# Patient Record
Sex: MALE | Race: WHITE | Employment: OTHER | URBAN - METROPOLITAN AREA
[De-identification: names, ages, dates, MRNs, and addresses within clinical notes are randomized per-mention and may not be internally consistent; named-entity substitution may affect disease eponyms.]

---

## 2017-10-31 ENCOUNTER — HOSPITAL ENCOUNTER (OUTPATIENT)
Dept: LAB | Age: 82
Discharge: HOME | End: 2017-10-31

## 2017-10-31 PROCEDURE — 88305 TISSUE EXAM BY PATHOLOGIST: CPT | Performed by: INTERNAL MEDICINE

## 2019-01-26 ENCOUNTER — HOSPITAL ENCOUNTER (EMERGENCY)
Age: 84
Discharge: HOME | End: 2019-01-26
Attending: EMERGENCY MEDICINE
Payer: MEDICARE

## 2019-01-26 ENCOUNTER — APPOINTMENT (OUTPATIENT)
Dept: GENERAL RADIOLOGY | Age: 84
End: 2019-01-26
Attending: EMERGENCY MEDICINE
Payer: MEDICARE

## 2019-01-26 VITALS
TEMPERATURE: 98 F | BODY MASS INDEX: 25.92 KG/M2 | WEIGHT: 175 LBS | HEART RATE: 70 BPM | OXYGEN SATURATION: 99 % | SYSTOLIC BLOOD PRESSURE: 150 MMHG | DIASTOLIC BLOOD PRESSURE: 70 MMHG | HEIGHT: 69 IN | RESPIRATION RATE: 16 BRPM

## 2019-01-26 DIAGNOSIS — N18.9 CHRONIC KIDNEY DISEASE, UNSPECIFIED CKD STAGE: ICD-10-CM

## 2019-01-26 DIAGNOSIS — R07.89 ATYPICAL CHEST PAIN: Primary | ICD-10-CM

## 2019-01-26 LAB
ALBUMIN SERPL-MCNC: 3.7 G/DL (ref 3.2–4.6)
ALBUMIN/GLOB SERPL: 1 {RATIO}
ALP SERPL-CCNC: 74 U/L (ref 50–136)
ALT SERPL-CCNC: 57 U/L (ref 12–65)
ANION GAP SERPL CALC-SCNC: 4 MMOL/L
AST SERPL-CCNC: 33 U/L (ref 15–37)
BASOPHILS # BLD: 0.1 K/UL (ref 0–0.2)
BASOPHILS NFR BLD: 1 % (ref 0–2)
BILIRUB SERPL-MCNC: 0.4 MG/DL (ref 0.2–1.1)
BUN SERPL-MCNC: 24 MG/DL (ref 8–23)
CALCIUM SERPL-MCNC: 8.9 MG/DL (ref 8.3–10.4)
CHLORIDE SERPL-SCNC: 107 MMOL/L (ref 98–107)
CO2 SERPL-SCNC: 27 MMOL/L (ref 21–32)
CREAT SERPL-MCNC: 2.24 MG/DL (ref 0.8–1.5)
DIFFERENTIAL METHOD BLD: NORMAL
EOSINOPHIL # BLD: 0.2 K/UL (ref 0–0.8)
EOSINOPHIL NFR BLD: 3 % (ref 0.5–7.8)
ERYTHROCYTE [DISTWIDTH] IN BLOOD BY AUTOMATED COUNT: 13.3 % (ref 11.9–14.6)
GLOBULIN SER CALC-MCNC: 3.7 G/DL (ref 2.3–3.5)
GLUCOSE SERPL-MCNC: 154 MG/DL (ref 65–100)
HCT VFR BLD AUTO: 47.1 % (ref 41.1–50.3)
HGB BLD-MCNC: 14.9 G/DL (ref 13.6–17.2)
IMM GRANULOCYTES # BLD AUTO: 0 K/UL (ref 0–0.5)
IMM GRANULOCYTES NFR BLD AUTO: 0 % (ref 0–5)
LIPASE SERPL-CCNC: 35 U/L (ref 73–393)
LYMPHOCYTES # BLD: 1.5 K/UL (ref 0.5–4.6)
LYMPHOCYTES NFR BLD: 18 % (ref 13–44)
MAGNESIUM SERPL-MCNC: 2.4 MG/DL (ref 1.8–2.4)
MCH RBC QN AUTO: 28.2 PG (ref 26.1–32.9)
MCHC RBC AUTO-ENTMCNC: 31.6 G/DL (ref 31.4–35)
MCV RBC AUTO: 89 FL (ref 79.6–97.8)
MONOCYTES # BLD: 0.7 K/UL (ref 0.1–1.3)
MONOCYTES NFR BLD: 9 % (ref 4–12)
NEUTS SEG # BLD: 5.5 K/UL (ref 1.7–8.2)
NEUTS SEG NFR BLD: 69 % (ref 43–78)
NRBC # BLD: 0 K/UL (ref 0–0.2)
PLATELET # BLD AUTO: 188 K/UL (ref 150–450)
PMV BLD AUTO: 10 FL (ref 9.4–12.3)
POTASSIUM SERPL-SCNC: 4.4 MMOL/L (ref 3.5–5.1)
PROT SERPL-MCNC: 7.4 G/DL
RBC # BLD AUTO: 5.29 M/UL (ref 4.23–5.6)
SODIUM SERPL-SCNC: 138 MMOL/L (ref 136–145)
TROPONIN I BLD-MCNC: 0 NG/ML (ref 0.02–0.05)
TROPONIN I BLD-MCNC: 0.03 NG/ML (ref 0.02–0.05)
TROPONIN I SERPL-MCNC: <0.02 NG/ML (ref 0.02–0.05)
WBC # BLD AUTO: 8 K/UL (ref 4.3–11.1)

## 2019-01-26 PROCEDURE — 84484 ASSAY OF TROPONIN QUANT: CPT

## 2019-01-26 PROCEDURE — 83690 ASSAY OF LIPASE: CPT

## 2019-01-26 PROCEDURE — 74011250636 HC RX REV CODE- 250/636: Performed by: EMERGENCY MEDICINE

## 2019-01-26 PROCEDURE — 83735 ASSAY OF MAGNESIUM: CPT

## 2019-01-26 PROCEDURE — 71045 X-RAY EXAM CHEST 1 VIEW: CPT

## 2019-01-26 PROCEDURE — 99285 EMERGENCY DEPT VISIT HI MDM: CPT | Performed by: EMERGENCY MEDICINE

## 2019-01-26 PROCEDURE — 85025 COMPLETE CBC W/AUTO DIFF WBC: CPT

## 2019-01-26 PROCEDURE — 96360 HYDRATION IV INFUSION INIT: CPT | Performed by: EMERGENCY MEDICINE

## 2019-01-26 PROCEDURE — 80053 COMPREHEN METABOLIC PANEL: CPT

## 2019-01-26 RX ADMIN — SODIUM CHLORIDE 1000 ML: 900 INJECTION, SOLUTION INTRAVENOUS at 20:52

## 2019-01-26 NOTE — ED TRIAGE NOTES
Pt in states mid chest pressure starting today at lunch. States he went for a walk and pain did not get worse or better. States pain went away 30 minutes pta. 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 States dizziness.  No sob/n/v/d.

## 2019-01-26 NOTE — ED PROVIDER NOTES
Patient presents to the ER complaining of chest pain. Reports symptoms started last evening with some left parasternal chest pressure that improved. Reports he has had some episodes of pain throughout the day. Reports muscle pain, resolved 30 minutes prior to arrival.  Does report some nausea but denies any vomiting. Denies any diaphoresis. Reports he's had some \"dizziness\" throughout the week. The history is provided by the patient. Chest Pain This is a new problem. The current episode started yesterday. The problem has been resolved. Duration of episode(s) is 45 minutes. The pain is present in the substernal region and left side. The pain is at a severity of 3/10. The pain is mild. The quality of the pain is described as pressure-like. The pain does not radiate. Associated symptoms include dizziness, malaise/fatigue and nausea. Pertinent negatives include no abdominal pain, no back pain, no diaphoresis, no fever, no leg pain, no numbness, no palpitations and no vomiting. His past medical history is significant for DM. Past Medical History:  
Diagnosis Date  AR (allergic rhinitis) 8/3/2016  Arthritis  Benign paroxysmal positional vertigo  Bilateral impacted cerumen  CAD (coronary artery disease) stent 10 years ago  Cancer ([address]   
 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  
 Chronic otitis media  Diabetes ([address] brittle IDDM; -110; 2 hours after a meal 140-180; last HA1C 7.2  Diverticulitis 8/3/2016  DNS (deviated nasal septum)  ETD (eustachian tube dysfunction)  Heart disease 8/3/2016  High frequency hearing loss  Hypercholesterolemia  Hyperlipidemia  Hypertrophy of both inferior nasal turbinates  Ill-defined condition   
 inguinal hernia  MHL (mixed hearing loss)  NO (nasal obstruction)  Otitis media, nonsuppurative  Pancreatitis   
 chronis-dx'ed 6 months ago  SNHL (sensorineural hearing loss) 8/3/2016  Unspecified sleep apnea   
 no cpap; O2 nc - setting \"30\"  Vertigo Past Surgical History:  
Procedure Laterality Date  CARDIAC SURG PROCEDURE UNLIST    
 stent  HX APPENDECTOMY  HX HEENT    
 augustine cat; vitrectomy  HX HERNIA REPAIR Right Dr Troy José 30 years ago.  HX MASTOIDECTOMY  1996  
 right modified canal wall down  HX OTHER SURGICAL    
 tube right ear  HX OTHER SURGICAL    
 skin lesion; local excision: SCC  
 HX OTHER SURGICAL  1/14/03 Dupuytrens contracture release Family History:  
Problem Relation Age of Onset  Cancer Maternal Grandmother   
     colon  Heart Disease Maternal Grandfather Social History Socioeconomic History  Marital status:  Spouse name: Not on file  Number of children: Not on file  Years of education: Not on file  Highest education level: Not on file Social Needs  Financial resource strain: Not on file  Food insecurity - worry: Not on file  Food insecurity - inability: Not on file  Transportation needs - medical: Not on file  Transportation needs - non-medical: Not on file Occupational History  Not on file Tobacco Use  Smoking status: Never Smoker  Smokeless tobacco: Never Used Substance and Sexual Activity  Alcohol use: No  
 Drug use: No  
 Sexual activity: Not on file Other Topics Concern  Not on file Social History Narrative  Not on file ALLERGIES: Iohexol; Other medication; Bextra [valdecoxib]; Cipro [ciprofloxacin]; Codeine; Iodinated contrast- oral and iv dye; and Penicillins Review of Systems Constitutional: Positive for malaise/fatigue. Negative for diaphoresis and fever. HENT: Negative for congestion, dental problem and drooling. Eyes: Negative for redness. Respiratory: Negative for chest tightness. Cardiovascular: Positive for chest pain. Negative for palpitations. Gastrointestinal: Positive for nausea. Negative for abdominal pain and vomiting. Endocrine: Negative for polydipsia, polyphagia and polyuria. Genitourinary: Positive for frequency. Musculoskeletal: Negative for back pain and gait problem. Skin: Negative for pallor and rash. Neurological: Positive for dizziness. Negative for light-headedness and numbness. Hematological: Negative for adenopathy. Does not bruise/bleed easily. Psychiatric/Behavioral: Negative for behavioral problems and confusion. All other systems reviewed and are negative. Vitals:  
 01/26/19 1841 BP: 171/79 Pulse: 67 Resp: 19 Temp: 98.3 °F (36.8 °C) SpO2: 98% Weight: 79.4 kg (175 lb) Height: 5' 9\" (1.753 m) Physical Exam  
Constitutional: He is oriented to person, place, and time. He appears well-developed and well-nourished. HENT:  
Head: Normocephalic and atraumatic. Eyes: Conjunctivae and EOM are normal. Pupils are equal, round, and reactive to light. Neck: Normal range of motion. Neck supple. Cardiovascular: Normal rate and regular rhythm. Pulmonary/Chest: Effort normal and breath sounds normal.  
Abdominal: Soft. Bowel sounds are normal. He exhibits no distension. There is no tenderness. Musculoskeletal: Normal range of motion. He exhibits no edema or deformity. Neurological: He is alert and oriented to person, place, and time. Nursing note and vitals reviewed. MDM Number of Diagnoses or Management Options Diagnosis management comments: Patient is well-appearing here. We will obtain basic labs, EKG and chest x-ray 9:50 PM 
EKG performed here, stable. Cardiac enzymes are negative ×2. Labs only significant for an elevated creatinine of 2.2. Only comparison. Labs we have are from 2016. Patient states he believes he has issues with his kidneys related to his diabetes. Nevertheless, given his negative testing here, appears stable for discharge and outpatient follow-up Amount and/or Complexity of Data Reviewed Clinical lab tests: reviewed and ordered Tests in the radiology section of CPT®: ordered and reviewed Independent visualization of images, tracings, or specimens: yes (EKG shows a normal sinus rhythm, rate of 62, normal axis, first-degree AV block noted, no acute ischemic changes. Stable In comparison to previous EKG) Risk of Complications, Morbidity, and/or Mortality Presenting problems: moderate Diagnostic procedures: low Management options: low Procedures Results Include: 
 
Recent Results (from the past 24 hour(s)) CBC WITH AUTOMATED DIFF Collection Time: 01/26/19  6:48 PM  
Result Value Ref Range WBC 8.0 4.3 - 11.1 K/uL  
 RBC 5.29 4.23 - 5.6 M/uL  
 HGB 14.9 13.6 - 17.2 g/dL HCT 47.1 41.1 - 50.3 % MCV 89.0 79.6 - 97.8 FL  
 MCH 28.2 26.1 - 32.9 PG  
 MCHC 31.6 31.4 - 35.0 g/dL  
 RDW 13.3 11.9 - 14.6 % PLATELET 299 023 - 502 K/uL MPV 10.0 9.4 - 12.3 FL ABSOLUTE NRBC 0.00 0.0 - 0.2 K/uL  
 DF AUTOMATED NEUTROPHILS 69 43 - 78 % LYMPHOCYTES 18 13 - 44 % MONOCYTES 9 4.0 - 12.0 % EOSINOPHILS 3 0.5 - 7.8 % BASOPHILS 1 0.0 - 2.0 % IMMATURE GRANULOCYTES 0 0.0 - 5.0 %  
 ABS. NEUTROPHILS 5.5 1.7 - 8.2 K/UL  
 ABS. LYMPHOCYTES 1.5 0.5 - 4.6 K/UL  
 ABS. MONOCYTES 0.7 0.1 - 1.3 K/UL  
 ABS. EOSINOPHILS 0.2 0.0 - 0.8 K/UL  
 ABS. BASOPHILS 0.1 0.0 - 0.2 K/UL  
 ABS. IMM. GRANS. 0.0 0.0 - 0.5 K/UL METABOLIC PANEL, COMPREHENSIVE Collection Time: 01/26/19  6:48 PM  
Result Value Ref Range Sodium 138 136 - 145 mmol/L Potassium 4.4 3.5 - 5.1 mmol/L Chloride 107 98 - 107 mmol/L  
 CO2 27 21 - 32 mmol/L Anion gap 4 mmol/L Glucose 154 (H) 65 - 100 mg/dL BUN 24 (H) 8 - 23 MG/DL Creatinine 2.24 (H) 0.8 - 1.5 MG/DL  
 GFR est AA 36 (L) >60 ml/min/1.73m2 GFR est non-AA 30 ml/min/1.73m2 Calcium 8.9 8.3 - 10.4 MG/DL Bilirubin, total 0.4 0.2 - 1.1 MG/DL  
 ALT (SGPT) 57 12 - 65 U/L  
 AST (SGOT) 33 15 - 37 U/L Alk. phosphatase 74 50 - 136 U/L Protein, total 7.4 g/dL Albumin 3.7 3.2 - 4.6 g/dL Globulin 3.7 (H) 2.3 - 3.5 g/dL A-G Ratio 1.0 MAGNESIUM Collection Time: 01/26/19  6:48 PM  
Result Value Ref Range Magnesium 2.4 1.8 - 2.4 mg/dL LIPASE Collection Time: 01/26/19  6:48 PM  
Result Value Ref Range Lipase 35 (L) 73 - 393 U/L  
TROPONIN I Collection Time: 01/26/19  6:48 PM  
Result Value Ref Range Troponin-I, Qt. <0.02 (L) 0.02 - 0.05 NG/ML  
POC TROPONIN-I Collection Time: 01/26/19  8:54 PM  
Result Value Ref Range Troponin-I (POC) 0 (L) 0.02 - 0.05 ng/ml Voice dictation software was used during the making of this note. This software is not perfect and grammatical and other typographical errors may be present. This note has been proofread, but may still contain errors.  
Olivia Franco MD; 1/26/2019 @9:51 PM  
===================================================================

## 2019-01-27 NOTE — DISCHARGE INSTRUCTIONS
Drink plenty of fluids  Follow-up with your primary care physician  Follow-up with cardiology  Return to the ER for any new or worsening symptoms    Chest Pain: Care Instructions  Your Care Instructions    There are many things that can cause chest pain. Some are not serious and will get better on their own in a few days. But some kinds of chest pain need more testing and treatment. Your doctor may have recommended a follow-up visit in the next 8 to 12 hours. If you are not getting better, you may need more tests or treatment. Even though your doctor has released you, you still need to watch for any problems. The doctor carefully checked you, but sometimes problems can develop later. If you have new symptoms or if your symptoms do not get better, get medical care right away. If you have worse or different chest pain or pressure that lasts more than 5 minutes or you passed out (lost consciousness), call 911 or seek other emergency help right away. A medical visit is only one step in your treatment. Even if you feel better, you still need to do what your doctor recommends, such as going to all suggested follow-up appointments and taking medicines exactly as directed. This will help you recover and help prevent future problems. How can you care for yourself at home? · Rest until you feel better. · Take your medicine exactly as prescribed. Call your doctor if you think you are having a problem with your medicine. · Do not drive after taking a prescription pain medicine. When should you call for help? Call 911 if:    · You passed out (lost consciousness).     · You have severe difficulty breathing.     · You have symptoms of a heart attack. These may include:  ? Chest pain or pressure, or a strange feeling in your chest.  ? Sweating. ? Shortness of breath. ? Nausea or vomiting.   ? Pain, pressure, or a strange feeling in your back, neck, jaw, or upper belly or in one or both shoulders or arms.  ? Lightheadedness or sudden weakness. ? A fast or irregular heartbeat. After you call 911, the  may tell you to chew 1 adult-strength or 2 to 4 low-dose aspirin. Wait for an ambulance. Do not try to drive yourself.    Call your doctor today if:    · You have any trouble breathing.     · Your chest pain gets worse.     · You are dizzy or lightheaded, or you feel like you may faint.     · You are not getting better as expected.     · You are having new or different chest pain. Where can you learn more? Go to http://bernice-radha.info/. Enter A120 in the search box to learn more about \"Chest Pain: Care Instructions. \"  Current as of: September 23, 2018  Content Version: 11.9  © 6255-3421 [employer]. Care instructions adapted under license by [employer] (which disclaims liability or warranty for this information). If you have questions about a medical condition or this instruction, always ask your healthcare professional. [place] [address] any warranty or liability for your use of this information. Patient Education        Chronic Kidney Disease: Care Instructions  Your Care Instructions    Chronic kidney disease happens when your kidneys don't work as well as they should. Your kidneys have a few important jobs. They remove waste from your blood. This waste leaves your body in your urine. They also balance your body's fluids and chemicals. When your kidneys don't work well, extra waste and fluid can build up. This can poison the body and sometimes cause death. The most common causes of this disease are diabetes and high blood pressure. In some cases, the disease develops in 2 to 3 months. But it usually develops over many years. If you take medicine and make healthy changes to your lifestyle, you may be able to prevent the disease from getting worse.  But if your kidney damage gets worse, you may need dialysis or a kidney transplant. Dialysis uses a machine to filter waste from the blood. A transplant is surgery to give you a healthy kidney from another person. Follow-up care is a key part of your treatment and safety. Be sure to make and go to all appointments, and call your doctor if you are having problems. It's also a good idea to know your test results and keep a list of the medicines you take. How can you care for yourself at home?   Treatments and appointments    · Be safe with medicines. Take your medicines exactly as prescribed. Call your doctor if you have any problems with your medicine. You also may take medicine to control your blood pressure or to treat diabetes. Many people who have diabetes take blood pressure medicine.     · If you have diabetes, do your best to keep your blood sugar in your target range. You may do this by eating healthy food and exercising. You may also take medicines.     · Go to your dialysis appointments if you have this treatment.     · Do not take ibuprofen (Advil, Motrin), naproxen (Aleve), or similar medicines, unless your doctor tells you to. These may make the disease worse.     · Do not take any vitamins, over-the-counter medicines, or herbal products without talking to your doctor first.     · Do not smoke or use other tobacco products. Smoking can reduce blood flow to the kidneys. If you need help quitting, talk to your doctor about stop-smoking programs and medicines. These can increase your chances of quitting for good.     · Do not drink alcohol or use illegal drugs.     · Talk to your doctor about an exercise plan. Exercise helps lower your blood pressure. It also makes you feel better.     · If you have an advance directive, let your doctor know. It may include a living will and a durable power of  for health care. If you don't have one, you may want to prepare one. It lets your doctor and loved ones know your health care wishes if you become unable to speak for yourself. Diet    · Talk to a registered dietitian. He or she can help you make a meal plan that is right for you. Most people with kidney disease need to limit salt (sodium), fluids, and protein. Some also have to limit potassium and phosphorus.     · You may have to give up many foods you like. But try to focus on the fact that this will help you stay healthy for as long as possible.     · If you have a hard time eating enough, talk to your doctor or dietitian about ways to add calories to your diet.     · Your diet may change as your disease changes. See your doctor for regular testing. And work with a dietitian to change your diet as needed. When should you call for help? Call 911 anytime you think you may need emergency care. For example, call if:    · You passed out (lost consciousness).    Call your doctor now or seek immediate medical care if:    · You have less urine than normal or no urine.     · You have trouble urinating or can urinate only very small amounts.     · You are confused or have trouble thinking clearly.     · You feel weaker or more tired than usual.     · You are very thirsty, lightheaded, or dizzy.     · You have nausea and vomiting.     · You have new swelling of your arms or feet, or your swelling is worse.     · You have blood in your urine.     · You have new or worse trouble breathing.    Watch closely for changes in your health, and be sure to contact your doctor if:    · You have any problems with your medicine or other treatment. Where can you learn more? Go to http://bernice-radha.info/. Enter N276 in the search box to learn more about \"Chronic Kidney Disease: Care Instructions. \"  Current as of: March 14, 2018  Content Version: 11.9  © 7632-8962 [employer]. Care instructions adapted under license by [employer] (which disclaims liability or warranty for this information).  If you have questions about a medical condition or this instruction, always ask your healthcare professional. [place] [address] any warranty or liability for your use of this information.

## 2019-01-27 NOTE — ED NOTES
I have reviewed discharge instructions with the patient. The patient verbalized understanding. Patient left ED via Discharge Method: ambulatory to Home with self. Opportunity for questions and clarification provided. Patient given 0 scripts. To continue your aftercare when you leave the hospital, you may receive an automated call from our care team to check in on how you are doing. This is a free service and part of our promise to provide the best care and service to meet your aftercare needs.  If you have questions, or wish to unsubscribe from this service please call [phone]. Thank you for Choosing our [facility].

## 2019-01-27 NOTE — ED NOTES
The pt stated that he had had an onset of pain at approx. Noon today. A short time layer he had gone for a walk to try and relieve the pain. Afterwards he had had a pair of episodes where he had started to sweat, but the sweating had stopped. But the chest pain remained. The pt denied any SOB, Nausea or vomiting. The pt had no complaints at time of ROS.

## 2019-02-07 ENCOUNTER — HOSPITAL ENCOUNTER (OUTPATIENT)
Dept: LAB | Age: 84
Discharge: HOME | End: 2019-02-07
Payer: MEDICARE

## 2019-02-07 DIAGNOSIS — N18.30 STAGE 3 CHRONIC KIDNEY DISEASE (HCC): ICD-10-CM

## 2019-02-07 PROBLEM — I25.10 CORONARY ARTERY DISEASE INVOLVING NATIVE HEART: Status: ACTIVE | Noted: 2019-02-07

## 2019-02-07 PROBLEM — I25.10 CORONARY ARTERY DISEASE INVOLVING NATIVE CORONARY ARTERY OF NATIVE HEART WITHOUT ANGINA PECTORIS: Status: ACTIVE | Noted: 2019-02-07

## 2019-02-07 PROBLEM — R07.9 CHEST PAIN AT REST: Status: ACTIVE | Noted: 2019-02-07

## 2019-02-07 PROBLEM — Z91.041: Status: ACTIVE | Noted: 2019-02-07

## 2019-02-07 PROBLEM — Z98.61 HISTORY OF PTCA: Status: ACTIVE | Noted: 2019-02-07

## 2019-02-07 LAB
ANION GAP SERPL CALC-SCNC: 5 MMOL/L
BUN SERPL-MCNC: 26 MG/DL (ref 8–23)
CALCIUM SERPL-MCNC: 8.8 MG/DL (ref 8.3–10.4)
CHLORIDE SERPL-SCNC: 108 MMOL/L (ref 98–107)
CO2 SERPL-SCNC: 28 MMOL/L (ref 21–32)
CREAT SERPL-MCNC: 1.6 MG/DL (ref 0.8–1.5)
GLUCOSE SERPL-MCNC: 152 MG/DL (ref 65–100)
POTASSIUM SERPL-SCNC: 4.7 MMOL/L (ref 3.5–5.1)
SODIUM SERPL-SCNC: 141 MMOL/L (ref 136–145)

## 2019-02-07 PROCEDURE — 80048 BASIC METABOLIC PNL TOTAL CA: CPT

## 2019-02-07 PROCEDURE — 36415 COLL VENOUS BLD VENIPUNCTURE: CPT

## 2019-03-05 PROBLEM — I25.10 ATHEROSCLEROSIS OF NATIVE CORONARY ARTERY OF NATIVE HEART: Status: ACTIVE | Noted: 2019-03-05

## 2019-03-05 PROBLEM — E11.9 TYPE 2 DIABETES MELLITUS WITHOUT COMPLICATION (HCC): Status: ACTIVE | Noted: 2019-03-05

## 2020-11-18 PROBLEM — Z82.49 FAMILY HISTORY OF MI (MYOCARDIAL INFARCTION): Status: ACTIVE | Noted: 2020-11-18

## 2020-11-18 PROBLEM — E78.2 MIXED HYPERLIPIDEMIA: Status: ACTIVE | Noted: 2020-11-18

## 2020-11-18 PROBLEM — I10 ESSENTIAL HYPERTENSION WITH GOAL BLOOD PRESSURE LESS THAN 130/85: Status: ACTIVE | Noted: 2020-11-18

## 2021-03-02 ENCOUNTER — APPOINTMENT (OUTPATIENT)
Dept: CT IMAGING | Age: 86
End: 2021-03-02
Attending: EMERGENCY MEDICINE
Payer: MEDICARE

## 2021-03-02 ENCOUNTER — HOSPITAL ENCOUNTER (EMERGENCY)
Age: 86
Discharge: HOME | End: 2021-03-02
Attending: EMERGENCY MEDICINE
Payer: MEDICARE

## 2021-03-02 VITALS
DIASTOLIC BLOOD PRESSURE: 71 MMHG | TEMPERATURE: 97.8 F | OXYGEN SATURATION: 97 % | WEIGHT: 178 LBS | BODY MASS INDEX: 26.36 KG/M2 | SYSTOLIC BLOOD PRESSURE: 143 MMHG | HEART RATE: 57 BPM | RESPIRATION RATE: 16 BRPM | HEIGHT: 69 IN

## 2021-03-02 DIAGNOSIS — N20.0 KIDNEY STONE: Primary | ICD-10-CM

## 2021-03-02 LAB
ALBUMIN SERPL-MCNC: 3.5 G/DL (ref 3.2–4.6)
ALBUMIN/GLOB SERPL: 0.9 {RATIO} (ref 1.2–3.5)
ALP SERPL-CCNC: 82 U/L (ref 50–136)
ALT SERPL-CCNC: 38 U/L (ref 12–65)
ANION GAP SERPL CALC-SCNC: 7 MMOL/L (ref 7–16)
AST SERPL-CCNC: 23 U/L (ref 15–37)
BACTERIA URNS QL MICRO: 0 /HPF
BASOPHILS # BLD: 0.1 K/UL (ref 0–0.2)
BASOPHILS NFR BLD: 1 % (ref 0–2)
BILIRUB SERPL-MCNC: 0.5 MG/DL (ref 0.2–1.1)
BUN SERPL-MCNC: 29 MG/DL (ref 8–23)
CALCIUM SERPL-MCNC: 9 MG/DL (ref 8.3–10.4)
CASTS URNS QL MICRO: NORMAL /LPF
CHLORIDE SERPL-SCNC: 109 MMOL/L (ref 98–107)
CO2 SERPL-SCNC: 25 MMOL/L (ref 21–32)
CREAT SERPL-MCNC: 1.86 MG/DL (ref 0.8–1.5)
DIFFERENTIAL METHOD BLD: ABNORMAL
EOSINOPHIL # BLD: 0.7 K/UL (ref 0–0.8)
EOSINOPHIL NFR BLD: 8 % (ref 0.5–7.8)
EPI CELLS #/AREA URNS HPF: NORMAL /HPF
ERYTHROCYTE [DISTWIDTH] IN BLOOD BY AUTOMATED COUNT: 13.5 % (ref 11.9–14.6)
GLOBULIN SER CALC-MCNC: 3.7 G/DL
GLUCOSE SERPL-MCNC: 152 MG/DL (ref 65–100)
HCT VFR BLD AUTO: 43 % (ref 41.1–50.3)
HGB BLD-MCNC: 13.7 G/DL (ref 13.6–17.2)
IMM GRANULOCYTES # BLD AUTO: 0 K/UL (ref 0–0.5)
IMM GRANULOCYTES NFR BLD AUTO: 0 % (ref 0–5)
LYMPHOCYTES # BLD: 1.5 K/UL (ref 0.5–4.6)
LYMPHOCYTES NFR BLD: 17 % (ref 13–44)
MCH RBC QN AUTO: 27.9 PG (ref 26.1–32.9)
MCHC RBC AUTO-ENTMCNC: 31.9 G/DL (ref 31.4–35)
MCV RBC AUTO: 87.6 FL (ref 79.6–97.8)
MONOCYTES # BLD: 0.7 K/UL (ref 0.1–1.3)
MONOCYTES NFR BLD: 8 % (ref 4–12)
NEUTS SEG # BLD: 6.1 K/UL (ref 1.7–8.2)
NEUTS SEG NFR BLD: 67 % (ref 43–78)
NRBC # BLD: 0 K/UL (ref 0–0.2)
PLATELET # BLD AUTO: 185 K/UL (ref 150–450)
PMV BLD AUTO: 10.4 FL (ref 9.4–12.3)
POTASSIUM SERPL-SCNC: 4.3 MMOL/L (ref 3.5–5.1)
PROT SERPL-MCNC: 7.2 G/DL (ref 6.3–8.2)
RBC # BLD AUTO: 4.91 M/UL (ref 4.23–5.6)
RBC #/AREA URNS HPF: NORMAL /HPF
SODIUM SERPL-SCNC: 141 MMOL/L (ref 136–145)
WBC # BLD AUTO: 9.1 K/UL (ref 4.3–11.1)
WBC URNS QL MICRO: NORMAL /HPF

## 2021-03-02 PROCEDURE — 74011250636 HC RX REV CODE- 250/636: Performed by: EMERGENCY MEDICINE

## 2021-03-02 PROCEDURE — 99283 EMERGENCY DEPT VISIT LOW MDM: CPT

## 2021-03-02 PROCEDURE — 96375 TX/PRO/DX INJ NEW DRUG ADDON: CPT

## 2021-03-02 PROCEDURE — 81003 URINALYSIS AUTO W/O SCOPE: CPT

## 2021-03-02 PROCEDURE — 80053 COMPREHEN METABOLIC PANEL: CPT

## 2021-03-02 PROCEDURE — 96374 THER/PROPH/DIAG INJ IV PUSH: CPT

## 2021-03-02 PROCEDURE — 81015 MICROSCOPIC EXAM OF URINE: CPT

## 2021-03-02 PROCEDURE — 85025 COMPLETE CBC W/AUTO DIFF WBC: CPT

## 2021-03-02 PROCEDURE — 74176 CT ABD & PELVIS W/O CONTRAST: CPT

## 2021-03-02 RX ORDER — ONDANSETRON 4 MG/1
4 TABLET, ORALLY DISINTEGRATING ORAL
Qty: 20 TAB | Refills: 0 | Status: SHIPPED | OUTPATIENT
Start: 2021-03-02 | End: 2021-03-23

## 2021-03-02 RX ORDER — HYDROMORPHONE HYDROCHLORIDE 1 MG/ML
0.5 INJECTION, SOLUTION INTRAMUSCULAR; INTRAVENOUS; SUBCUTANEOUS
Status: COMPLETED | OUTPATIENT
Start: 2021-03-02 | End: 2021-03-02

## 2021-03-02 RX ORDER — ONDANSETRON 2 MG/ML
4 INJECTION INTRAMUSCULAR; INTRAVENOUS
Status: COMPLETED | OUTPATIENT
Start: 2021-03-02 | End: 2021-03-02

## 2021-03-02 RX ORDER — OXYCODONE AND ACETAMINOPHEN 5; 325 MG/1; MG/1
1 TABLET ORAL
Qty: 20 TAB | Refills: 0 | Status: ON HOLD | OUTPATIENT
Start: 2021-03-02 | End: 2021-03-07 | Stop reason: SDUPTHER

## 2021-03-02 RX ADMIN — HYDROMORPHONE HYDROCHLORIDE 0.5 MG: 1 INJECTION, SOLUTION INTRAMUSCULAR; INTRAVENOUS; SUBCUTANEOUS at 10:51

## 2021-03-02 RX ADMIN — ONDANSETRON 4 MG: 2 INJECTION INTRAMUSCULAR; INTRAVENOUS at 10:51

## 2021-03-02 NOTE — ED PROVIDER NOTES
Mask was worn during the entire patient examination. Joshua Perez is a 80 y.o. male who presents to the ED with a chief complaint of right flank pain. Patient states this started yesterday. It is mainly in the right CVA region does not radiate. He denies any urinary complaints no blood in his urine. Denies previous history of anything like this. States he does have a urology appointment tomorrow for prostate issues. He does have nausea but no vomiting. His pain has been severe and is currently a 7 out of 10. No abdominal discomfort. The history is provided by the patient. Flank Pain   Pertinent negatives include no chest pain, no fever and no abdominal pain. 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-redness around it, bulgin in areas with some blackened areas-advised hin to see dermatologist    Chronic otitis media     Diabetes ([address]     brittle IDDM; -110; 2 hours after a meal 140-180; last HA1C 7.2    Diverticulitis 8/3/2016    DNS (deviated nasal septum)     ETD (eustachian tube dysfunction)     Heart disease 8/3/2016    High frequency hearing loss     Hypercholesterolemia     Hyperlipidemia     Hypertrophy of both inferior nasal turbinates     Ill-defined condition     inguinal hernia    MHL (mixed hearing loss)     NO (nasal obstruction)     Otitis media, nonsuppurative     Pancreatitis     chronis-dx'ed 6 months ago    SNHL (sensorineural hearing loss) 8/3/2016    Unspecified sleep apnea     no cpap; O2 nc - setting \"30\"    Vertigo        Past Surgical History:   Procedure Laterality Date    HX APPENDECTOMY      HX HEENT      augustine cat; vitrectomy    HX HERNIA REPAIR Right     Dr Anjali Bahena 30 years ago.     HX MASTOIDECTOMY  1996    right modified canal wall down    HX OTHER SURGICAL      tube right ear    HX OTHER SURGICAL      skin lesion; local excision: SCC    HX OTHER SURGICAL  1/14/03    Dupuytrens contracture release    [place] CARDIAC SURG PROCEDURE UNLIST      stent         Family History:   Problem Relation Age of Onset    Cancer Maternal Grandmother         colon    Heart Disease Maternal Grandfather        Social History     Socioeconomic History    Marital status:      Spouse name: Not on file    Number of children: Not on file    Years of education: Not on file    Highest education level: Not on file   Occupational History    Not on file   Social Needs    Financial resource strain: Not on file    Food insecurity     Worry: Not on file     Inability: Not on file    Transportation needs     Medical: Not on file     Non-medical: Not on file   Tobacco Use    Smoking status: Never Smoker    Smokeless tobacco: Never Used   Substance and Sexual Activity    Alcohol use: No    Drug use: No    Sexual activity: Not on file   Lifestyle    Physical activity     Days per week: Not on file     Minutes per session: Not on file    Stress: Not on file   Relationships    Social connections     Talks on phone: Not on file     Gets together: Not on file     Attends Jain service: Not on file     Active member of club or organization: Not on file     Attends meetings of clubs or organizations: Not on file     Relationship status: Not on file    Intimate partner violence     Fear of current or ex partner: Not on file     Emotionally abused: Not on file     Physically abused: Not on file     Forced sexual activity: Not on file   Other Topics Concern    Not on file   Social History Narrative    Not on file         ALLERGIES: Iohexol, Other medication, Atorvastatin, Cipro [ciprofloxacin], Codeine, Iodinated contrast media, Penicillins, and Valdecoxib    Review of Systems   Constitutional: Negative for chills, fatigue and fever. Respiratory: Negative for cough, chest tightness, shortness of breath, wheezing and stridor. Cardiovascular: Negative for chest pain and palpitations. Gastrointestinal: Negative for abdominal distention, abdominal pain, diarrhea, nausea and vomiting. Genitourinary: Positive for flank pain. Negative for discharge and penile pain. Musculoskeletal: Negative for arthralgias, myalgias, neck pain and neck stiffness. Skin: Negative for pallor and rash. All other systems reviewed and are negative. Vitals:    03/02/21 0830   BP: (!) 152/69   Pulse: (!) 59   Resp: 20   Temp: 97.8 °F (36.6 °C)   SpO2: 98%   Weight: 80.7 kg (178 lb)   Height: 5' 9\" (1.753 m)            Physical Exam  Vitals signs and nursing note reviewed. Constitutional:       General: He is not in acute distress. Appearance: Normal appearance. He is well-developed. He is not ill-appearing, toxic-appearing or diaphoretic. HENT:      Head: Normocephalic and atraumatic. Eyes:      General: No scleral icterus. Conjunctiva/sclera: Conjunctivae normal.   Neck:      Musculoskeletal: Normal range of motion. No neck rigidity or muscular tenderness. Trachea: No tracheal deviation. Cardiovascular:      Rate and Rhythm: Normal rate and regular rhythm. Heart sounds: No murmur. No friction rub. No gallop. Pulmonary:      Effort: Pulmonary effort is normal. No respiratory distress. Breath sounds: Normal breath sounds. No stridor. No wheezing, rhonchi or rales. Chest:      Chest wall: No tenderness. Abdominal:      General: Abdomen is flat. There is no distension. Tenderness: There is no abdominal tenderness. There is no guarding or rebound. Hernia: No hernia is present. Musculoskeletal: Normal range of motion. General: No swelling, tenderness, deformity or signs of injury. Comments: Right flank pain in the CVA region.      Lymphadenopathy:      Cervical: No cervical adenopathy.   Skin:     General: Skin is warm.      Capillary Refill: Capillary refill takes less than 2 seconds.      Coloration: Skin is not jaundiced or pale.      Findings: No bruising.   Neurological:      General: No focal deficit present.      Mental Status: He is alert and oriented to person, place, and time. Mental status is at baseline.   Psychiatric:         Mood and Affect: Mood normal.         Behavior: Behavior normal.          MDM  Number of Diagnoses or Management Options  Diagnosis management comments: I made patient aware of both the kidney stones and the liver mass that we saw on CT scan.  He actually has follow-up with his urologist tomorrow.  He is aware after his kidney stone has improved he will need additional work-up for this liver mass and we will refer to oncology for help with his work-up.  PCP may also be able to help arrange this.    Mireya Chow MD; 3/2/2021 @10:49 AM Voice dictation software was used during the making of this note.  This software is not perfect and grammatical and other typographical errors may be present.  This note has not been proofread for errors.  ===================================================================          Amount and/or Complexity of Data Reviewed  Clinical lab tests: reviewed and ordered (Results for orders placed or performed during the hospital encounter of 03/02/21  -CBC WITH AUTOMATED DIFF       Result                      Value             Ref Range           WBC                         9.1               4.3 - 11.1 K*       RBC                         4.91              4.23 - 5.6 M*       HGB                         13.7              13.6 - 17.2 *       HCT                         43.0              41.1 - 50.3 %       MCV                         87.6              79.6 - 97.8 *       MCH                         27.9              26.1 - 32.9 *       MCHC                        31.9              31.4 - 35.0 *       RDW                         13.5              11.9 - 14.6 %       PLATELET                    185               150 - 450 K/*       MPV                         10.4              9.4 - 12.3 FL       ABSOLUTE NRBC               0.00              0.0 - 0.2 K/*       DF                          AUTOMATED                             NEUTROPHILS                 67                43 - 78 %           LYMPHOCYTES                 17                13 - 44 %           MONOCYTES                   8                 4.0 - 12.0 %        EOSINOPHILS                 8 (H)             0.5 - 7.8 %         BASOPHILS                   1                 0.0 - 2.0 %         IMMATURE GRANULOCYTES       0                 0.0 - 5.0 %         ABS. NEUTROPHILS            6.1               1.7 - 8.2 K/*       ABS. LYMPHOCYTES            1.5               0.5 - 4.6 K/*       ABS. MONOCYTES              0.7               0.1 - 1.3 K/*       ABS. EOSINOPHILS            0.7               0.0 - 0.8 K/*       ABS. BASOPHILS              0.1               0.0 - 0.2 K/*       ABS. IMM.  GRANS.            0.0               0.0 - 0.5 K/*  -METABOLIC PANEL, COMPREHENSIVE       Result                      Value             Ref Range           Sodium                      141               136 - 145 mm*       Potassium                   4.3               3.5 - 5.1 mm*       Chloride                    109 (H)           98 - 107 mmo*       CO2                         25                21 - 32 mmol*       Anion gap                   7                 7 - 16 mmol/L       Glucose                     152 (H)           65 - 100 mg/*       BUN                         29 (H)            8 - 23 MG/DL        Creatinine                  1.86 (H)          0.8 - 1.5 MG*       GFR est AA                  44 (L)            >60 ml/min/1*       GFR est non-AA              37 (L)            >60 ml/min/1*       Calcium                     9.0               8.3 - 10.4 M*       Bilirubin, total 0.5               0.2 - 1.1 MG*       ALT (SGPT)                  38                12 - 65 U/L         AST (SGOT)                  23                15 - 37 U/L         Alk. phosphatase            82                50 - 136 U/L        Protein, total              7.2               6.3 - 8.2 g/*       Albumin                     3.5               3.2 - 4.6 g/*       Globulin                    3.7               g/dL                A-G Ratio                   0.9 (L)           1.2 - 3.5      -URINE MICROSCOPIC       Result                      Value             Ref Range           WBC                         20-50             0 /hpf              RBC                         5-10              0 /hpf              Epithelial cells            0-3               0 /hpf              Bacteria                    0                 0 /hpf              Casts                       3-5               0 /lpf        )  Tests in the radiology section of CPT®: ordered and reviewed (Ct Urogram Wo Cont    Result Date: 3/2/2021  EXAM: CT of the abdomen and pelvis without contrast. Indication: Right flank pain Comparison: CT abdomen and pelvis, 12/4/2016 Multiple axial images were obtained through the abdomen and pelvis without IV contrast.  Radiation dose reduction techniques were used for this study: All CT scans performed at this facility use one or all of the following: Automated exposure control, adjustment of the mA and/or kVp according to patient's size, iterative reconstruction. FINDINGS: LOWER THORAX: There is subsegmental atelectasis/scarring involving the lung bases with elevation of the left hemidiaphragm. LIVER: There is a new heterogeneous 6.4 x 3.7 x 5.3 cm mass along the inferior tip of the right hepatic lobe. No other definitive liver lesions are seen though evaluation is limited without IV contrast. BILIARY: The gallbladder is normal. No biliary duct dilation. SPLEEN: No splenomegaly.  PANCREAS: Sequela of chronic pancreatitis with numerous pancreatic parenchymal calcifications several calcifications in a linear distribution in the pancreatic head likely represent pancreatic duct calculi. No acute inflammatory changes. ADRENALS: No adrenal nodule or adrenal hypertrophy. URINARY SYSTEM: There are multiple simple cysts bilaterally. There is an obstructing 4 mm calculus in the proximal right ureter resulting in mild right hydroureteronephrosis with right perinephric inflammatory stranding. There is an additional nonobstructing right interpolar calculus measuring 3 mm. No left renal calculi. There is mild diffuse urinary bladder wall thickening without significant surrounding inflammatory changes. BOWEL: Stomach, small bowel, and large bowel are normal in course and caliber. No evidence of obstruction. The appendix is surgically absent. Colonic diverticulosis without evidence of acute diverticulitis. VASCULAR: The abdominal aorta and iliac arterial system are nonaneurysmal with advanced atherosclerosis. NODES: No lymphadenopathy. FLUID:  No free fluid. REPRODUCTIVE: Prostatomegaly. BONES/SOFT TISSUE: Small bilateral fat-containing inguinal hernias. Prior lower abdominal wall hernia repair. No acute or aggressive osseous abnormality. 1. Obstructing 4 mm calculus in the proximal right ureter resulting in mild right hydroureteronephrosis with perinephric inflammatory stranding. 2. Additional nonobstructing 3 mm right renal calculus. 3. New heterogeneous liver mass measuring up to 6.4 cm. Differential considerations include metastatic and primary liver neoplasms. Evaluation of the solid organs is limited without IV contrast. Consider follow-up examination with contrast. 4. Chronic pancreatitis.  5. Prostatomegaly with urinary bladder wall thickening, possibly related to chronic outlet obstruction.    )  Discuss the patient with other providers: yes           Procedures

## 2021-03-02 NOTE — DISCHARGE INSTRUCTIONS
After your kidney stone has been treated please follow-up with your physician or oncology listed above.

## 2021-03-02 NOTE — ED TRIAGE NOTES
Pt to ED with c/o right flank pain x 1 day. Pt is concerned he has a kidney stone. Pt denies painful urination. Pt advises nausea. Masked.

## 2021-03-04 ENCOUNTER — ANESTHESIA EVENT (OUTPATIENT)
Dept: SURGERY | Age: 86
End: 2021-03-04
Payer: MEDICARE

## 2021-03-05 ENCOUNTER — ANESTHESIA (OUTPATIENT)
Dept: SURGERY | Age: 86
End: 2021-03-05
Payer: MEDICARE

## 2021-03-05 ENCOUNTER — HOSPITAL ENCOUNTER (OUTPATIENT)
Age: 86
Setting detail: OBSERVATION
Discharge: HOME | End: 2021-03-07
Attending: UROLOGY | Admitting: UROLOGY
Payer: MEDICARE

## 2021-03-05 DIAGNOSIS — N20.1 RIGHT URETERAL STONE: ICD-10-CM

## 2021-03-05 DIAGNOSIS — N30.00 ACUTE CYSTITIS WITHOUT HEMATURIA: ICD-10-CM

## 2021-03-05 DIAGNOSIS — R16.0 LIVER MASS: ICD-10-CM

## 2021-03-05 DIAGNOSIS — N20.0 KIDNEY STONE: ICD-10-CM

## 2021-03-05 DIAGNOSIS — F40.240 CLAUSTROPHOBIA: ICD-10-CM

## 2021-03-05 LAB
APPEARANCE UR: CLEAR
BACTERIA URNS QL MICRO: 0 /HPF
BILIRUB UR QL: NEGATIVE
CASTS URNS QL MICRO: 0 /LPF
COLOR UR: YELLOW
EPI CELLS #/AREA URNS HPF: 0 /HPF
GLUCOSE BLD STRIP.AUTO-MCNC: 130 MG/DL (ref 65–100)
GLUCOSE BLD STRIP.AUTO-MCNC: 133 MG/DL (ref 65–100)
GLUCOSE BLD STRIP.AUTO-MCNC: 203 MG/DL (ref 65–100)
GLUCOSE UR STRIP.AUTO-MCNC: NEGATIVE MG/DL
HGB UR QL STRIP: ABNORMAL
KETONES UR QL STRIP.AUTO: ABNORMAL MG/DL
LEUKOCYTE ESTERASE UR QL STRIP.AUTO: ABNORMAL
NITRITE UR QL STRIP.AUTO: NEGATIVE
PH UR STRIP: 5.5 [PH] (ref 5–9)
PROT UR STRIP-MCNC: 30 MG/DL
RBC #/AREA URNS HPF: ABNORMAL /HPF
SP GR UR REFRACTOMETRY: 1.02 (ref 1–1.02)
UROBILINOGEN UR QL STRIP.AUTO: 1 EU/DL (ref 0.2–1)
WBC URNS QL MICRO: ABNORMAL /HPF

## 2021-03-05 PROCEDURE — 76010000138 HC OR TIME 0.5 TO 1 HR: Performed by: UROLOGY

## 2021-03-05 PROCEDURE — 77030040831 HC BAG URINE DRNG MDII -A: Performed by: UROLOGY

## 2021-03-05 PROCEDURE — 74011250637 HC RX REV CODE- 250/637: Performed by: STUDENT IN AN ORGANIZED HEALTH CARE EDUCATION/TRAINING PROGRAM

## 2021-03-05 PROCEDURE — 77030005518 HC CATH URETH FOL 2W BARD -B: Performed by: UROLOGY

## 2021-03-05 PROCEDURE — 74011250636 HC RX REV CODE- 250/636: Performed by: STUDENT IN AN ORGANIZED HEALTH CARE EDUCATION/TRAINING PROGRAM

## 2021-03-05 PROCEDURE — 74011250637 HC RX REV CODE- 250/637: Performed by: UROLOGY

## 2021-03-05 PROCEDURE — 77030010509 HC AIRWY LMA MSK TELE -A: Performed by: STUDENT IN AN ORGANIZED HEALTH CARE EDUCATION/TRAINING PROGRAM

## 2021-03-05 PROCEDURE — 2709999900 HC NON-CHARGEABLE SUPPLY

## 2021-03-05 PROCEDURE — 76210000006 HC OR PH I REC 0.5 TO 1 HR: Performed by: UROLOGY

## 2021-03-05 PROCEDURE — 74011000250 HC RX REV CODE- 250: Performed by: UROLOGY

## 2021-03-05 PROCEDURE — 77030019908 HC STETH ESOPH SIMS -A: Performed by: STUDENT IN AN ORGANIZED HEALTH CARE EDUCATION/TRAINING PROGRAM

## 2021-03-05 PROCEDURE — 74011250636 HC RX REV CODE- 250/636: Performed by: NURSE ANESTHETIST, CERTIFIED REGISTERED

## 2021-03-05 PROCEDURE — C1769 GUIDE WIRE: HCPCS | Performed by: UROLOGY

## 2021-03-05 PROCEDURE — 81001 URINALYSIS AUTO W/SCOPE: CPT

## 2021-03-05 PROCEDURE — 87186 SC STD MICRODIL/AGAR DIL: CPT

## 2021-03-05 PROCEDURE — 82962 GLUCOSE BLOOD TEST: CPT

## 2021-03-05 PROCEDURE — 76060000032 HC ANESTHESIA 0.5 TO 1 HR: Performed by: UROLOGY

## 2021-03-05 PROCEDURE — 87088 URINE BACTERIA CULTURE: CPT

## 2021-03-05 PROCEDURE — 87086 URINE CULTURE/COLONY COUNT: CPT

## 2021-03-05 PROCEDURE — 2709999900 HC NON-CHARGEABLE SUPPLY: Performed by: UROLOGY

## 2021-03-05 PROCEDURE — 99218 HC RM OBSERVATION: CPT

## 2021-03-05 PROCEDURE — C2617 STENT, NON-COR, TEM W/O DEL: HCPCS | Performed by: UROLOGY

## 2021-03-05 PROCEDURE — 99218 PR INITIAL OBSERVATION CARE/DAY 30 MINUTES: CPT | Performed by: UROLOGY

## 2021-03-05 PROCEDURE — 77030040361 HC SLV COMPR DVT MDII -B: Performed by: UROLOGY

## 2021-03-05 PROCEDURE — 52332 CYSTOSCOPY AND TREATMENT: CPT | Performed by: UROLOGY

## 2021-03-05 PROCEDURE — 74011000250 HC RX REV CODE- 250: Performed by: NURSE ANESTHETIST, CERTIFIED REGISTERED

## 2021-03-05 PROCEDURE — 74011250636 HC RX REV CODE- 250/636: Performed by: UROLOGY

## 2021-03-05 PROCEDURE — 77030040922 HC BLNKT HYPOTHRM STRY -A: Performed by: STUDENT IN AN ORGANIZED HEALTH CARE EDUCATION/TRAINING PROGRAM

## 2021-03-05 DEVICE — URETERAL STENT
Type: IMPLANTABLE DEVICE | Site: URETER | Status: FUNCTIONAL
Brand: PERCUFLEX™ PLUS

## 2021-03-05 RX ORDER — INSULIN LISPRO 100 [IU]/ML
15 INJECTION, SOLUTION INTRAVENOUS; SUBCUTANEOUS
Status: DISCONTINUED | OUTPATIENT
Start: 2021-03-05 | End: 2021-03-07 | Stop reason: HOSPADM

## 2021-03-05 RX ORDER — ACETAMINOPHEN 325 MG/1
650 TABLET ORAL
Status: DISCONTINUED | OUTPATIENT
Start: 2021-03-05 | End: 2021-03-07 | Stop reason: HOSPADM

## 2021-03-05 RX ORDER — HYDROMORPHONE HYDROCHLORIDE 1 MG/ML
0.5 INJECTION, SOLUTION INTRAMUSCULAR; INTRAVENOUS; SUBCUTANEOUS
Status: DISCONTINUED | OUTPATIENT
Start: 2021-03-05 | End: 2021-03-05 | Stop reason: HOSPADM

## 2021-03-05 RX ORDER — SODIUM CHLORIDE, SODIUM LACTATE, POTASSIUM CHLORIDE, CALCIUM CHLORIDE 600; 310; 30; 20 MG/100ML; MG/100ML; MG/100ML; MG/100ML
100 INJECTION, SOLUTION INTRAVENOUS CONTINUOUS
Status: DISCONTINUED | OUTPATIENT
Start: 2021-03-05 | End: 2021-03-05 | Stop reason: HOSPADM

## 2021-03-05 RX ORDER — FENTANYL CITRATE 50 UG/ML
INJECTION, SOLUTION INTRAMUSCULAR; INTRAVENOUS AS NEEDED
Status: DISCONTINUED | OUTPATIENT
Start: 2021-03-05 | End: 2021-03-05 | Stop reason: HOSPADM

## 2021-03-05 RX ORDER — SODIUM CHLORIDE 0.9 % (FLUSH) 0.9 %
5-40 SYRINGE (ML) INJECTION AS NEEDED
Status: DISCONTINUED | OUTPATIENT
Start: 2021-03-05 | End: 2021-03-05 | Stop reason: HOSPADM

## 2021-03-05 RX ORDER — SODIUM CHLORIDE 0.9 % (FLUSH) 0.9 %
5-40 SYRINGE (ML) INJECTION EVERY 8 HOURS
Status: DISCONTINUED | OUTPATIENT
Start: 2021-03-05 | End: 2021-03-07 | Stop reason: HOSPADM

## 2021-03-05 RX ORDER — FLUMAZENIL 0.1 MG/ML
0.2 INJECTION INTRAVENOUS
Status: DISCONTINUED | OUTPATIENT
Start: 2021-03-05 | End: 2021-03-05 | Stop reason: HOSPADM

## 2021-03-05 RX ORDER — SODIUM CHLORIDE 0.9 % (FLUSH) 0.9 %
5-40 SYRINGE (ML) INJECTION EVERY 8 HOURS
Status: DISCONTINUED | OUTPATIENT
Start: 2021-03-05 | End: 2021-03-05 | Stop reason: HOSPADM

## 2021-03-05 RX ORDER — NALOXONE HYDROCHLORIDE 0.4 MG/ML
0.1 INJECTION, SOLUTION INTRAMUSCULAR; INTRAVENOUS; SUBCUTANEOUS AS NEEDED
Status: DISCONTINUED | OUTPATIENT
Start: 2021-03-05 | End: 2021-03-05 | Stop reason: HOSPADM

## 2021-03-05 RX ORDER — OXYCODONE HYDROCHLORIDE 5 MG/1
5 TABLET ORAL
Status: DISCONTINUED | OUTPATIENT
Start: 2021-03-05 | End: 2021-03-05 | Stop reason: HOSPADM

## 2021-03-05 RX ORDER — ONDANSETRON 2 MG/ML
4 INJECTION INTRAMUSCULAR; INTRAVENOUS
Status: DISCONTINUED | OUTPATIENT
Start: 2021-03-05 | End: 2021-03-07 | Stop reason: HOSPADM

## 2021-03-05 RX ORDER — SODIUM CHLORIDE 0.9 % (FLUSH) 0.9 %
5-40 SYRINGE (ML) INJECTION AS NEEDED
Status: DISCONTINUED | OUTPATIENT
Start: 2021-03-05 | End: 2021-03-07 | Stop reason: HOSPADM

## 2021-03-05 RX ORDER — GUAIFENESIN 100 MG/5ML
81 LIQUID (ML) ORAL DAILY
Status: DISCONTINUED | OUTPATIENT
Start: 2021-03-06 | End: 2021-03-05

## 2021-03-05 RX ORDER — MORPHINE SULFATE 2 MG/ML
2 INJECTION, SOLUTION INTRAMUSCULAR; INTRAVENOUS
Status: DISCONTINUED | OUTPATIENT
Start: 2021-03-05 | End: 2021-03-07 | Stop reason: HOSPADM

## 2021-03-05 RX ORDER — LIDOCAINE HYDROCHLORIDE 20 MG/ML
INJECTION, SOLUTION EPIDURAL; INFILTRATION; INTRACAUDAL; PERINEURAL AS NEEDED
Status: DISCONTINUED | OUTPATIENT
Start: 2021-03-05 | End: 2021-03-05 | Stop reason: HOSPADM

## 2021-03-05 RX ORDER — GUAIFENESIN 100 MG/5ML
81 LIQUID (ML) ORAL DAILY
Status: DISCONTINUED | OUTPATIENT
Start: 2021-03-06 | End: 2021-03-07 | Stop reason: HOSPADM

## 2021-03-05 RX ORDER — OXYCODONE AND ACETAMINOPHEN 5; 325 MG/1; MG/1
1 TABLET ORAL
Status: DISCONTINUED | OUTPATIENT
Start: 2021-03-05 | End: 2021-03-07 | Stop reason: HOSPADM

## 2021-03-05 RX ORDER — TAMSULOSIN HYDROCHLORIDE 0.4 MG/1
0.8 CAPSULE ORAL DAILY
Status: DISCONTINUED | OUTPATIENT
Start: 2021-03-06 | End: 2021-03-06

## 2021-03-05 RX ORDER — SODIUM CHLORIDE 9 MG/ML
75 INJECTION, SOLUTION INTRAVENOUS CONTINUOUS
Status: DISCONTINUED | OUTPATIENT
Start: 2021-03-05 | End: 2021-03-07 | Stop reason: HOSPADM

## 2021-03-05 RX ORDER — DIPHENHYDRAMINE HYDROCHLORIDE 50 MG/ML
12.5 INJECTION, SOLUTION INTRAMUSCULAR; INTRAVENOUS
Status: DISCONTINUED | OUTPATIENT
Start: 2021-03-05 | End: 2021-03-05 | Stop reason: HOSPADM

## 2021-03-05 RX ORDER — ATORVASTATIN CALCIUM 20 MG/1
20 TABLET, FILM COATED ORAL
Status: DISCONTINUED | OUTPATIENT
Start: 2021-03-05 | End: 2021-03-07 | Stop reason: HOSPADM

## 2021-03-05 RX ORDER — INSULIN GLARGINE 100 [IU]/ML
20 INJECTION, SOLUTION SUBCUTANEOUS 2 TIMES DAILY
Status: DISCONTINUED | OUTPATIENT
Start: 2021-03-06 | End: 2021-03-06

## 2021-03-05 RX ORDER — CEFAZOLIN SODIUM/WATER 2 G/20 ML
2 SYRINGE (ML) INTRAVENOUS EVERY 8 HOURS
Status: DISCONTINUED | OUTPATIENT
Start: 2021-03-05 | End: 2021-03-07 | Stop reason: HOSPADM

## 2021-03-05 RX ORDER — INSULIN GLARGINE 100 [IU]/ML
25 INJECTION, SOLUTION SUBCUTANEOUS 2 TIMES DAILY
Status: DISCONTINUED | OUTPATIENT
Start: 2021-03-05 | End: 2021-03-05

## 2021-03-05 RX ORDER — ONDANSETRON 2 MG/ML
INJECTION INTRAMUSCULAR; INTRAVENOUS AS NEEDED
Status: DISCONTINUED | OUTPATIENT
Start: 2021-03-05 | End: 2021-03-05 | Stop reason: HOSPADM

## 2021-03-05 RX ORDER — GENTAMICIN SULFATE 80 MG/100ML
80 INJECTION, SOLUTION INTRAVENOUS
Status: COMPLETED | OUTPATIENT
Start: 2021-03-05 | End: 2021-03-05

## 2021-03-05 RX ORDER — LIDOCAINE HYDROCHLORIDE 10 MG/ML
0.1 INJECTION INFILTRATION; PERINEURAL AS NEEDED
Status: DISCONTINUED | OUTPATIENT
Start: 2021-03-05 | End: 2021-03-05 | Stop reason: HOSPADM

## 2021-03-05 RX ORDER — AMOXICILLIN 250 MG
1 CAPSULE ORAL
Status: DISCONTINUED | OUTPATIENT
Start: 2021-03-05 | End: 2021-03-07 | Stop reason: HOSPADM

## 2021-03-05 RX ORDER — NALOXONE HYDROCHLORIDE 0.4 MG/ML
0.4 INJECTION, SOLUTION INTRAMUSCULAR; INTRAVENOUS; SUBCUTANEOUS AS NEEDED
Status: DISCONTINUED | OUTPATIENT
Start: 2021-03-05 | End: 2021-03-07 | Stop reason: HOSPADM

## 2021-03-05 RX ORDER — PROPOFOL 10 MG/ML
INJECTION, EMULSION INTRAVENOUS AS NEEDED
Status: DISCONTINUED | OUTPATIENT
Start: 2021-03-05 | End: 2021-03-05 | Stop reason: HOSPADM

## 2021-03-05 RX ORDER — GUAIFENESIN 100 MG/5ML
81 LIQUID (ML) ORAL ONCE
Status: COMPLETED | OUTPATIENT
Start: 2021-03-05 | End: 2021-03-05

## 2021-03-05 RX ADMIN — ATORVASTATIN CALCIUM 20 MG: 20 TABLET, FILM COATED ORAL at 21:39

## 2021-03-05 RX ADMIN — LIDOCAINE HYDROCHLORIDE 80 MG: 20 INJECTION, SOLUTION EPIDURAL; INFILTRATION; INTRACAUDAL; PERINEURAL at 14:28

## 2021-03-05 RX ADMIN — SODIUM CHLORIDE, SODIUM LACTATE, POTASSIUM CHLORIDE, AND CALCIUM CHLORIDE 100 ML/HR: 600; 310; 30; 20 INJECTION, SOLUTION INTRAVENOUS at 13:23

## 2021-03-05 RX ADMIN — GENTAMICIN SULFATE 80 MG: 80 INJECTION, SOLUTION INTRAVENOUS at 13:23

## 2021-03-05 RX ADMIN — Medication 10 ML: at 16:44

## 2021-03-05 RX ADMIN — CEFAZOLIN 2 G: 10 INJECTION, POWDER, FOR SOLUTION INTRAVENOUS at 18:20

## 2021-03-05 RX ADMIN — FENTANYL CITRATE 25 MCG: 50 INJECTION INTRAMUSCULAR; INTRAVENOUS at 14:37

## 2021-03-05 RX ADMIN — FENTANYL CITRATE 25 MCG: 50 INJECTION INTRAMUSCULAR; INTRAVENOUS at 14:34

## 2021-03-05 RX ADMIN — PROPOFOL 120 MG: 10 INJECTION, EMULSION INTRAVENOUS at 14:28

## 2021-03-05 RX ADMIN — CEFAZOLIN 2 G: 10 INJECTION, POWDER, FOR SOLUTION INTRAVENOUS at 21:40

## 2021-03-05 RX ADMIN — Medication 10 ML: at 21:41

## 2021-03-05 RX ADMIN — ONDANSETRON 4 MG: 2 INJECTION INTRAMUSCULAR; INTRAVENOUS at 14:46

## 2021-03-05 RX ADMIN — SODIUM CHLORIDE 75 ML/HR: 900 INJECTION, SOLUTION INTRAVENOUS at 18:20

## 2021-03-05 RX ADMIN — ASPIRIN 81 MG: 81 TABLET, CHEWABLE ORAL at 14:05

## 2021-03-05 NOTE — PROGRESS NOTES
Hourly rounding completed on this shift. No new complaints at this time. All needs met. Pt is currently resting in bed. Will give report to oncoming nurse.

## 2021-03-05 NOTE — BRIEF OP NOTE
Brief Postoperative Note    Patient: Maggie Corcoran  YOB: 1933  MRN: [id number]    Date of Procedure: 3/5/2021     Pre-Op Diagnosis: Urinary tract infection without hematuria, site unspecified [N39.0]    Post-Op Diagnosis: Same as preoperative diagnosis. Procedure(s):  CYSTOSCOPY RIGHT URETERAL STENT INSERTION, COMPLEX HAGAN CATHETER PLACEMENT OVER A WIRE    Surgeon(s): Penelope Dooley MD    Surgical Assistant: None    Anesthesia: General     Estimated Blood Loss (mL): Minimal    Complications: None    Specimens:   ID Type Source Tests Collected by Time Destination   1 : URINE Urine Bladder CULTURE, URINE Penelope Dooley MD 3/5/2021 1452 Microbiology        Implants:   Implant Name Type Inv. Item Serial No.  Lot No. LRB No. Used Action   STENT URET 6F 26CM -- PERCUFLEX PLUS [id number] - [id number]  STENT URET 6F 26CM -- [address] [place] [address] [id number]  [employer]_ [id number] Right 1 Implanted       Drains: 21 Fr two-way hagan with 10 cc in balloon    Findings: Purulent urine from R UO upon stent placement.      Electronically Signed by Pao Mccarthy MD on 3/5/2021 at 3:02 PM

## 2021-03-05 NOTE — PROGRESS NOTES
Spiritual Care visit. Initial Visit, Pre Surgery Consult. Visit and prayer before patient goes to surgery.     Visit by Leann Escobar M.Ed., Th.B. ,Staff

## 2021-03-05 NOTE — PROGRESS NOTES
TRANSFER - IN REPORT:    Verbal report received from [address] Rodrigez on Jerod Ralph  being received from PACU for routine post - op      Report consisted of patients Situation, Background, Assessment and   Recommendations(SBAR). Information from the following report(s) SBAR was reviewed with the receiving nurse. Opportunity for questions and clarification was provided. Assessment completed upon patients arrival to unit and care assumed.

## 2021-03-05 NOTE — H&P
Update History & Physical    The Patient's History and Physical was reviewed with the patient and I examined the patient. There was no change. The surgical site was confirmed by the patient and me. Plan:  The risk, benefits, expected outcome, and alternative to the recommended procedure have been discussed with the patient. Patient understands and wants to proceed with the procedure. Electronically signed by Cindie Phoenix, MD on 3/5/2021 at 3:28 PM    [facility]  [address], [address] [place] [address] [facility]  : 1933         Chief Complaint   Patient presents with    Follow-up      HPI      Batsheva Washington is a 80 y.o.  male with a PMH of BPH/LUTS s/p TURP 20 years ago, now with a 4 mm proximal R ureteral stone who presents for evaluation.      He was last seen by me in 2020 for cystoscopy to evaluate refractory BPH/LUTS despite flomax BID. Cystoscopy showed large ball-valving median lobe and lateral lobe regrowth since TURP. Today, he reports worsening, bothersome LUTS and wants to discuss additional TURP.     As for his ureteral stone, he presented to the ER on 3/2/21 with severe R flank pain. CT urogram performed which confirmed the above findings of stone. I personally reviewed images today. He did have some stranding around kidney but had no leukocytosis and U/A with no bacteria in the ER. U/A today shows LE but no nitrites. He reports some chills but no fever.   He has not passed the stone and R flank pain is still present.       KUB today shows diffuse stool burden but no visible stone.           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 near wrist-redness around it, bulgin in areas with some blackened areas-advised hin to see dermatologist   • Chronic otitis media     • Diabetes (HCC)       brittle IDDM; -110; 2 hours after a meal 140-180; last HA1C 7.2   • Diverticulitis 8/3/2016   • DNS (deviated nasal septum)     • ETD (eustachian tube dysfunction)     • Heart disease 8/3/2016   • High frequency hearing loss     • Hypercholesterolemia     • Hyperlipidemia     • Hypertrophy of both inferior nasal turbinates     • Ill-defined condition       inguinal hernia   • MHL (mixed hearing loss)     • NO (nasal obstruction)     • Otitis media, nonsuppurative     • Pancreatitis       chronis-dx'ed 6 months ago   • SNHL (sensorineural hearing loss) 8/3/2016   • Unspecified sleep apnea       no cpap; O2 nc - setting \"30\"   • Vertigo              Past Surgical History:   Procedure Laterality Date   • HX APPENDECTOMY       • HX HEENT         augustine cat; vitrectomy   • HX HERNIA REPAIR Right       Dr dooley 30 years ago.   • HX MASTOIDECTOMY   1996     right modified canal wall down   • HX OTHER SURGICAL         tube right ear   • HX OTHER SURGICAL         skin lesion; local excision: SCC   • HX OTHER SURGICAL   1/14/03     Dupuytrens contracture release   • [place] CARDIAC SURG PROCEDURE UNLIST         stent             Current Outpatient Medications   Medication Sig Dispense Refill   • trimethoprim-sulfamethoxazole (Bactrim DS) 160-800 mg per tablet Take 1 Tab by mouth two (2) times a day for 7 days. 14 Tab 0   • oxyCODONE-acetaminophen (Percocet) 5-325 mg per tablet Take 1 Tab by mouth every six (6) hours as needed for Pain for up to 5 days. Max Daily Amount: 4 Tabs. 20 Tab 0   • ondansetron (ZOFRAN ODT) 4 mg disintegrating tablet Take 1 Tab by mouth every eight (8) hours as needed for Nausea. 20 Tab 0   • fluticasone propionate (FLONASE) 50 mcg/actuation nasal spray Use 2 sprays in each nostril daily 1 Bottle 5   • insulin aspart (NOVOLOG FLEXPEN U-100 INSULIN SC) by SubCUTAneous  route.        tamsulosin (FLOMAX) 0.4 mg capsule TAKE 1 CAPSULE BY MOUTH EVERYDAY AT BEDTIME        cholecalciferol (VITAMIN D3) 1,000 unit cap Take  by mouth daily.        insulin glargine (LANTUS) 100 unit/mL injection 15 Units by SubCUTAneous route two (2) times a day. (Patient taking differently: 20 Units by SubCUTAneous route two (2) times a day.) 1 Vial 0    amylase-lipase-protease (CREON) 12,000-38,000 -60,000 unit capsule Take  by mouth three (3) times daily (with meals).        LIPITOR 20 mg tablet take 20 mg by mouth daily. Every bedtime.         FISH OIL 1,000 mg Cap Take 1 Cap by mouth daily.  Am.  1 tsp liquid- concentrated  Last dose 5/13/14        aspirin 81 mg Tab Take 81 mg by mouth nightly.                Allergies   Allergen Reactions    Iohexol Anaphylaxis    Other Medication Anaphylaxis       Omnipaque Rediflo 240 (contrast)    Atorvastatin Myalgia       Currently tolerating     Cipro [Ciprofloxacin] Hives    Codeine Unknown (comments)       Makes patient hyperactive    Iodinated Contrast Media Anaphylaxis    Penicillins Rash    Valdecoxib Hives and Rash      Social History            Socioeconomic History    Marital status:        Spouse name: Not on file    Number of children: Not on file    Years of education: Not on file    Highest education level: Not on file   Occupational History    Not on file   Social Needs    Financial resource strain: Not on file    Food insecurity       Worry: Not on file       Inability: Not on file    Transportation needs       Medical: Not on file       Non-medical: Not on file   Tobacco Use    Smoking status: Never Smoker    Smokeless tobacco: Never Used   Substance and Sexual Activity    Alcohol use: No    Drug use: No    Sexual activity: Not on file   Lifestyle    Physical activity       Days per week: Not on file       Minutes per session: Not on file    Stress: Not on file   Relationships    Social connections       Talks on phone: Not on file       Gets together: Not on file       Attends Denominational service: Not on file       Active member of club or organization: Not on file       Attends meetings of clubs or organizations: Not on file       Relationship status: Not on file    Intimate partner violence       Fear of current or ex partner: Not on file       Emotionally abused: Not on file       Physically abused: Not on file       Forced sexual activity: Not on file   Other Topics Concern    Not on file   Social History Narrative    Not on file            Family History   Problem Relation Age of Onset    Cancer Maternal Grandmother           colon    Heart Disease Maternal Grandfather           Review of Systems  Constitutional: Positive for chills and malaise/fatigue. Negative for fever, appetite change, headaches and weight loss. Skin:  Negative for skin lesions, rash and itching. Eyes:  Negative for visual disturbance, eye pain and eye discharge. ENT:  Negative for difficulty articulating words, pain swallowing, high frequency hearing loss and dry mouth. Respiratory:  Negative for cough, blood in sputum, shortness of breath and wheezing. Cardiovascular:  Negative for chest pain, hypertension, irregular heartbeat, leg pain, leg swelling, regular rate and rhythm and varicose veins. GI:  Negative for nausea, vomiting, abdominal pain, blood in stool, constipation, diarrhea, indigestion and heartburn. Genitourinary: Positive for nocturia, slower stream, urgency and frequent urination. Negative for urinary burning, hematuria, flank pain, recurrent UTIs, history of urolithiasis, straining, leakage w/ urge and incomplete emptying. Musculoskeletal: Positive for back pain. Negative for bone pain, arthralgias, tenderness, muscle weakness and neck pain. Neurological:  Negative for dizziness, focal weakness, numbness, seizures and tremors. Psychological:  Negative for depression and psychiatric problem.   Endocrine:  Negative for cold intolerance, thirst, excessive urination, fatigue and heat intolerance. Hem/Lymphatic:  Negative for easy bleeding, easy bruising and frequent infections.        Urinalysis  UA - Dipstick         Results for orders placed or performed in visit on 03/03/21   AMB POC URINALYSIS DIP STICK AUTO W/O MICRO (PGU)     Status: None   Result Value Ref Range Status     Glucose (UA POC) Negative Negative mg/dL Final     Bilirubin (UA POC) Negative Negative Final     Ketones (UA POC) Negative Negative Final     Specific gravity (UA POC) 1.010 1.001 - 1.035 Final     Blood (UA POC) Moderate Negative Final     pH (UA POC) 5.5 4.6 - 8.0 Final     Protein (UA POC) Trace Negative Final     Urobilinogen (POC) 0.2 mg/dL   Final     Nitrites (UA POC) Negative Negative Final     Leukocyte esterase (UA POC) Small Negative Final         Visit Vitals  /73   Pulse 96   Temp 99.2 °F (37.3 °C) (Temporal)         GENERAL: No acute distress, Awake, Alert, Oriented X 3, Gait normal  CARDIAC: regular rate and rhythm  CHEST AND LUNG: Easy work of breathing, clear to auscultation bilaterally, no cyanosis  ABDOMEN: soft, RUQ tender, non-distended, positive bowel sounds, no organomegaly, no palpable masses, no guarding, no rebound tenderness  BACK: R CVA TTP  SKIN: No rash, no erythema, no lacerations or abrasions, no ecchymosis  NEUROLOGIC: cranial nerves 2-12 grossly intact      Assessment and Plan      ICD-10-CM ICD-9-CM     1. BPH with obstruction/lower urinary tract symptoms  N40.1 600.01 AMB POC URINALYSIS DIP STICK AUTO W/O MICRO (PGU)     N13.8 599.69     2. Kidney stone  N20.0 592.0 AMB POC URINALYSIS DIP STICK AUTO W/O MICRO (PGU)         AMB POC XRAY ABDOMEN 1 VIEW   3. Acute cystitis with hematuria  N30.01 595.0 CULTURE, URINE         CULTURE, URINE         trimethoprim-sulfamethoxazole (Bactrim DS) 160-800 mg per tablet      RIght Proximal Ureteral Stone:   Not visible on KUB today.   U/A not definitive for UTI but shows leukocytes, few bacteria, no nitrites. He reports chills but no fever. Urine sent for culture today and bactrim started empirically for possible UTI. I also recommended cystoscopy, right ureteral stent placement in the 24-48 hours to relieve pain and decompress the R kidney. I do not recommend ureteroscopy until infection is definitively ruled out. Details of stent procedure reviewed today. He currently is not demonstrating active signs of sepsis, but I instructed him if fevers develop or he starts to feel worse, he needs to proceed with  Proceed to the ER or call immediately for further instruction, as stent placement will need to be moved up. He expressed understanding. He will then need interval R ureteroscopy in the future once infection has definitively been ruled out to treat the stone. Surgery scheduler will call him to schedule stent urgently.       BPH/LUTS:   We discussed refractory LUTS and cystoscopy findings in detail. Options include adding finasteride to flomax which is unlikely to be successful vs. rezum vs. Repeat TURP. He wants to proceed with repeat TURP, but again will wait and do this at the time of ureteroscopy in the future once infection has been definitively ruled out.      Acute Cystitis:   Bactrim sent to pharmacy. Urine sent for culture. Will call with results and adjust antibiotic if necessary.     I have spent 30 minutes today reviewing previous notes, test results and face to face with the patient as well as documenting.        Colton Gomez M.D.  [facility]  [place] [address] [address] [facility]  Phone: [phone]  Fax: [phone]

## 2021-03-05 NOTE — ANESTHESIA PREPROCEDURE EVALUATION
Anesthetic History   No history of anesthetic complications            Review of Systems / Medical History  Patient summary reviewed, nursing notes reviewed and pertinent labs reviewed    Pulmonary  Within defined limits                 Neuro/Psych   Within defined limits           Cardiovascular              CAD (stent 2008), CABG/stent, cardiac stents and hyperlipidemia    Exercise tolerance: >4 METS     GI/Hepatic/Renal     GERD (chronic pancreatitis)    Renal disease: CRI       Endo/Other    Diabetes: well controlled, type 2, using insulin    Arthritis     Other Findings            Physical Exam    Airway  Mallampati: II  TM Distance: > 6 cm  Neck ROM: normal range of motion   Mouth opening: Normal     Cardiovascular  Regular rate and rhythm,  S1 and S2 normal,  no murmur, click, rub, or gallop  Rhythm: regular  Rate: normal         Dental  No notable dental hx       Pulmonary  Breath sounds clear to auscultation               Abdominal         Other Findings            Anesthetic Plan    ASA: 3  Anesthesia type: general          Induction: Intravenous  Anesthetic plan and risks discussed with: Patient

## 2021-03-05 NOTE — PROGRESS NOTES
03/05/21 1622   Dual Skin Pressure Injury Assessment   Dual Skin Pressure Injury Assessment WDL   Second Care Provider (Based on [address] [place] [address]) Susanna Nogueira RN   Skin Integumentary   Skin Integumentary (WDL) WDL    Pressure  Injury Documentation No Pressure Injury Noted-Pressure Ulcer Prevention Initiated     No skin issues noted.

## 2021-03-05 NOTE — ANESTHESIA POSTPROCEDURE EVALUATION
Procedure(s):  CYSTOSCOPY RIGHT URETERAL STENT INSERTION. general    Anesthesia Post Evaluation      Multimodal analgesia: multimodal analgesia used between 6 hours prior to anesthesia start to PACU discharge  Patient location during evaluation: bedside  Patient participation: complete - patient participated  Level of consciousness: awake and alert  Pain management: adequate  Airway patency: patent  Anesthetic complications: no  Cardiovascular status: acceptable  Respiratory status: acceptable  Hydration status: acceptable  Post anesthesia nausea and vomiting:  controlled  Final Post Anesthesia Temperature Assessment:  Normothermia (36.0-37.5 degrees C)      INITIAL Post-op Vital signs:   Vitals Value Taken Time   /67 03/05/21 1552   Temp 36.9 °C (98.4 °F) 03/05/21 1542   Pulse 67 03/05/21 1553   Resp 17 03/05/21 1547   SpO2 95 % 03/05/21 1553   Vitals shown include unvalidated device data.

## 2021-03-05 NOTE — OP NOTES
[facility]  OPERATIVE REPORT    Name:  Laya West  MR#:  [id number]  :  1933  ACCOUNT #:  [de-identified]  DATE OF SERVICE:  2021    PREOPERATIVE DIAGNOSIS:  Right ureteral stone. POSTOPERATIVE DIAGNOSIS:  Right ureteral stone. PROCEDURE PERFORMED:  Cystoscopy, right ureteral stent placement, complex Valencia catheter placement over a wire. SURGEON:  Portia Hicks MD    ASSISTANT:  None. ANESTHESIA:  General.    COMPLICATIONS:  None. SPECIMENS REMOVED:  Urine for culture. IMPLANTS:  6 x 26 double-J right ureteral stent without strings. ESTIMATED BLOOD LOSS:  Minimal.    DRAINS:  20-S[place] two-way Valencia catheter with 10 mL in balloon. FINDINGS:  Purulent urine from the right UO upon stent placement. INDICATIONS:  The patient is an 79-year-old gentleman with a history of BPH and an obstructing right ureteral stone and concern for urinary tract infection. He was counseled on management options and opted to proceed with urgent right ureteral stent placement after risk-benefit discussion was had and treatment of the infection prior to treatment of his prostate and right ureteral stone. DESCRIPTION OF PROCEDURE:  After informed consent was obtained, the correct patient identified in the preoperative holding area, he was taken back to operating suite, placed on the table in supine position. Time out was performed confirming correct patient and planned procedure. He received 80 mg of IV gentamicin prior to smooth induction of general endotracheal anesthesia. He was moved into the dorsal lithotomy position, prepped and draped in usual sterile fashion. I began the case by inserting a 22-S[place] Arabian rigid cystoscope with  30-degree lens in the urethra and advanced it into the patient's bladder. Pancystoscopy was performed which revealed a large intravesical median lobe regrowth from his prior prostate procedure.   The right ureteral orifice was identified in the orthotopic position and cannulated with a sensor wire. This was advanced under fluoroscopic guidance into the right renal pelvis. There was a significant amount of purulent urine effluxing from the right UO upon wire placement. This was thick and toothpaste like. I took a sample of the urine from the bladder and sent it off for culture. I then passed a 6 x 26 double-J right ureteral stent without strings over the wire under fluoroscopic guidance into the right renal pelvis. Once this was in position, I pulled the wire noting good curl in the renal pelvis as well as the patient's bladder. I then removed the rigid cystoscope. I attempted Valencia catheter placement as he had significant purulent discharge from the right UO and I wanted to ensure that all drained out adequately. Unfortunately Valencia catheter placement was unsuccessful, so I had to use the cystoscope and reinsert it under direct vision to place the sensor wire. Once the sensor wire was in place in the bladder, I backloaded the scope off the wire and then placed a 20-Omani [place] catheter over the wire into the patient's bladder. Once this was in place, I inflated the balloon with 10 mL sterile water and connected it to gravity drainage. He was then awoken from anesthesia, transferred to PACU in stable condition. He tolerated the procedure well. There were no complications. I decided to admit him overnight for observation given the purulent discharge and high risk for urosepsis and ensure that he did not run any fevers. If all goes well, he will possibly be discharged tomorrow.         Kimberlyn Kan MD      [id number]  D:  03/05/2021 15:17  T:  03/05/2021 18:27  JOB #:  [id number]

## 2021-03-05 NOTE — PERIOP NOTES
TRANSFER - OUT REPORT:    Verbal report given to Fidelia robert on [facility]  being transferred to [facility] for routine post - op       Report consisted of patients Situation, Background, Assessment and   Recommendations(SBAR). Information from the following report(s) SBAR, Intake/Output and Cardiac Rhythm nsr was reviewed with the receiving nurse. Lines:   Peripheral IV 03/05/21 Left Forearm (Active)   Site Assessment Clean, dry, & intact 03/05/21 1508   Phlebitis Assessment 0 03/05/21 1508   Infiltration Assessment 0 03/05/21 1508   Dressing Status Clean, dry, & intact 03/05/21 1508   Dressing Type Tape;Transparent 03/05/21 1508   Hub Color/Line Status Infusing 03/05/21 1508        Opportunity for questions and clarification was provided. Patient transported with:   O2 @ 2 liters    VTE prophylaxis orders have been written for [facility]. Patient and family given floor number and nurses name. Family updated re: pt status after security code verified.

## 2021-03-06 LAB
ANION GAP SERPL CALC-SCNC: 10 MMOL/L (ref 7–16)
ANION GAP SERPL CALC-SCNC: 7 MMOL/L (ref 7–16)
BUN SERPL-MCNC: 49 MG/DL (ref 8–23)
BUN SERPL-MCNC: 56 MG/DL (ref 8–23)
CALCIUM SERPL-MCNC: 8.3 MG/DL (ref 8.3–10.4)
CALCIUM SERPL-MCNC: 8.4 MG/DL (ref 8.3–10.4)
CHLORIDE SERPL-SCNC: 111 MMOL/L (ref 98–107)
CHLORIDE SERPL-SCNC: 112 MMOL/L (ref 98–107)
CO2 SERPL-SCNC: 18 MMOL/L (ref 21–32)
CO2 SERPL-SCNC: 22 MMOL/L (ref 21–32)
CREAT SERPL-MCNC: 3.02 MG/DL (ref 0.8–1.5)
CREAT SERPL-MCNC: 3.25 MG/DL (ref 0.8–1.5)
ERYTHROCYTE [DISTWIDTH] IN BLOOD BY AUTOMATED COUNT: 13.9 % (ref 11.9–14.6)
GLUCOSE BLD STRIP.AUTO-MCNC: 104 MG/DL (ref 65–100)
GLUCOSE BLD STRIP.AUTO-MCNC: 110 MG/DL (ref 65–100)
GLUCOSE BLD STRIP.AUTO-MCNC: 128 MG/DL (ref 65–100)
GLUCOSE BLD STRIP.AUTO-MCNC: 236 MG/DL (ref 65–100)
GLUCOSE SERPL-MCNC: 104 MG/DL (ref 65–100)
GLUCOSE SERPL-MCNC: 125 MG/DL (ref 65–100)
HCT VFR BLD AUTO: 39.7 % (ref 41.1–50.3)
HGB BLD-MCNC: 12.7 G/DL (ref 13.6–17.2)
MAGNESIUM SERPL-MCNC: 2.3 MG/DL (ref 1.8–2.4)
MCH RBC QN AUTO: 27.9 PG (ref 26.1–32.9)
MCHC RBC AUTO-ENTMCNC: 32 G/DL (ref 31.4–35)
MCV RBC AUTO: 87.1 FL (ref 79.6–97.8)
NRBC # BLD: 0 K/UL (ref 0–0.2)
PHOSPHATE SERPL-MCNC: 3.5 MG/DL (ref 2.3–3.7)
PLATELET # BLD AUTO: 183 K/UL (ref 150–450)
PMV BLD AUTO: 10.6 FL (ref 9.4–12.3)
POTASSIUM SERPL-SCNC: 4.4 MMOL/L (ref 3.5–5.1)
POTASSIUM SERPL-SCNC: 4.5 MMOL/L (ref 3.5–5.1)
RBC # BLD AUTO: 4.56 M/UL (ref 4.23–5.6)
SODIUM SERPL-SCNC: 140 MMOL/L (ref 138–145)
SODIUM SERPL-SCNC: 140 MMOL/L (ref 138–145)
WBC # BLD AUTO: 7.7 K/UL (ref 4.3–11.1)

## 2021-03-06 PROCEDURE — 2709999900 HC NON-CHARGEABLE SUPPLY

## 2021-03-06 PROCEDURE — 96374 THER/PROPH/DIAG INJ IV PUSH: CPT

## 2021-03-06 PROCEDURE — 74011000250 HC RX REV CODE- 250: Performed by: UROLOGY

## 2021-03-06 PROCEDURE — 99218 HC RM OBSERVATION: CPT

## 2021-03-06 PROCEDURE — 80048 BASIC METABOLIC PNL TOTAL CA: CPT

## 2021-03-06 PROCEDURE — 74011636637 HC RX REV CODE- 636/637: Performed by: UROLOGY

## 2021-03-06 PROCEDURE — 74011250636 HC RX REV CODE- 250/636: Performed by: UROLOGY

## 2021-03-06 PROCEDURE — 82962 GLUCOSE BLOOD TEST: CPT

## 2021-03-06 PROCEDURE — 36415 COLL VENOUS BLD VENIPUNCTURE: CPT

## 2021-03-06 PROCEDURE — 74011250637 HC RX REV CODE- 250/637: Performed by: UROLOGY

## 2021-03-06 PROCEDURE — 85027 COMPLETE CBC AUTOMATED: CPT

## 2021-03-06 PROCEDURE — 84100 ASSAY OF PHOSPHORUS: CPT

## 2021-03-06 PROCEDURE — 83735 ASSAY OF MAGNESIUM: CPT

## 2021-03-06 PROCEDURE — 99224 PR SBSQ OBSERVATION CARE/DAY 15 MINUTES: CPT | Performed by: UROLOGY

## 2021-03-06 PROCEDURE — 96376 TX/PRO/DX INJ SAME DRUG ADON: CPT

## 2021-03-06 RX ORDER — ZOLPIDEM TARTRATE 5 MG/1
5 TABLET ORAL
Status: DISCONTINUED | OUTPATIENT
Start: 2021-03-06 | End: 2021-03-07 | Stop reason: HOSPADM

## 2021-03-06 RX ORDER — MAG HYDROX/ALUMINUM HYD/SIMETH 200-200-20
30 SUSPENSION, ORAL (FINAL DOSE FORM) ORAL
Status: DISCONTINUED | OUTPATIENT
Start: 2021-03-06 | End: 2021-03-07 | Stop reason: HOSPADM

## 2021-03-06 RX ORDER — INSULIN GLARGINE 100 [IU]/ML
20 INJECTION, SOLUTION SUBCUTANEOUS 2 TIMES DAILY
Status: DISCONTINUED | OUTPATIENT
Start: 2021-03-06 | End: 2021-03-07 | Stop reason: HOSPADM

## 2021-03-06 RX ORDER — TAMSULOSIN HYDROCHLORIDE 0.4 MG/1
0.8 CAPSULE ORAL
Status: DISCONTINUED | OUTPATIENT
Start: 2021-03-06 | End: 2021-03-07 | Stop reason: HOSPADM

## 2021-03-06 RX ADMIN — SODIUM CHLORIDE 75 ML/HR: 900 INJECTION, SOLUTION INTRAVENOUS at 06:00

## 2021-03-06 RX ADMIN — CEFAZOLIN 2 G: 10 INJECTION, POWDER, FOR SOLUTION INTRAVENOUS at 21:21

## 2021-03-06 RX ADMIN — ALUMINUM HYDROXIDE, MAGNESIUM HYDROXIDE, AND SIMETHICONE 30 ML: 200; 200; 20 SUSPENSION ORAL at 14:55

## 2021-03-06 RX ADMIN — SODIUM CHLORIDE 75 ML/HR: 900 INJECTION, SOLUTION INTRAVENOUS at 19:39

## 2021-03-06 RX ADMIN — PANCRELIPASE LIPASE, PANCRELIPASE PROTEASE, PANCRELIPASE AMYLASE 1 CAPSULE: 20000; 63000; 84000 CAPSULE, DELAYED RELEASE ORAL at 11:49

## 2021-03-06 RX ADMIN — Medication 10 ML: at 05:53

## 2021-03-06 RX ADMIN — Medication 10 ML: at 14:55

## 2021-03-06 RX ADMIN — CEFAZOLIN 2 G: 10 INJECTION, POWDER, FOR SOLUTION INTRAVENOUS at 05:53

## 2021-03-06 RX ADMIN — PANCRELIPASE LIPASE, PANCRELIPASE PROTEASE, PANCRELIPASE AMYLASE 1 CAPSULE: 20000; 63000; 84000 CAPSULE, DELAYED RELEASE ORAL at 16:35

## 2021-03-06 RX ADMIN — ATORVASTATIN CALCIUM 20 MG: 20 TABLET, FILM COATED ORAL at 21:22

## 2021-03-06 RX ADMIN — CEFAZOLIN 2 G: 10 INJECTION, POWDER, FOR SOLUTION INTRAVENOUS at 14:55

## 2021-03-06 RX ADMIN — INSULIN LISPRO 15 UNITS: 100 INJECTION, SOLUTION INTRAVENOUS; SUBCUTANEOUS at 11:49

## 2021-03-06 RX ADMIN — ZOLPIDEM TARTRATE 5 MG: 5 TABLET ORAL at 21:21

## 2021-03-06 RX ADMIN — TAMSULOSIN HYDROCHLORIDE 0.8 MG: 0.4 CAPSULE ORAL at 21:22

## 2021-03-06 RX ADMIN — INSULIN GLARGINE 20 UNITS: 100 INJECTION, SOLUTION SUBCUTANEOUS at 09:29

## 2021-03-06 RX ADMIN — ASPIRIN 81 MG: 81 TABLET, CHEWABLE ORAL at 09:29

## 2021-03-06 RX ADMIN — Medication 10 ML: at 21:22

## 2021-03-06 NOTE — PROGRESS NOTES
Urology Progress Note    Admit Date: 3/5/2021    Subjective:     Patient reports that catheter made it hard to sleep last night. Cr elevated to 3.25 this AM (double baseline). Tolerating stent. Catheter draining. Pain controlled. Tolerating regular diet. Ambulating. Urine culture pending. On IV ancef. Objective:     Patient Vitals for the past 8 hrs:   BP Temp Pulse Resp SpO2 Weight   03/06/21 0845 130/73 97.9 °F (36.6 °C) 68 16 95 %    03/06/21 0345 (!) 128/56 98.2 °F (36.8 °C) 69 16 96 % 182 lb 9.6 oz (82.8 kg)     No intake/output data recorded. 03/04 1901 - 03/06 0700  In: 1437.5 [P.O.:240;  I.V.:1197.5]  Out: 1602 [Urine:1600]    Physical Exam:   Visit Vitals  /73 (BP 1 Location: Right upper arm, BP Patient Position: At rest)   Pulse 68   Temp 97.9 °F (36.6 °C)   Resp 16   Ht 5' 9\" (1.753 m)   Wt 182 lb 9.6 oz (82.8 kg)   SpO2 95%   BMI 26.97 kg/m²        GENERAL: No acute distress, Awake, Alert, Oriented X 3, Gait normal  CARDIAC: regular rate and rhythm  CHEST AND LUNG: Easy work of breathing, clear to auscultation bilaterally, no cyanosis  ABDOMEN: soft, non tender, non-distended, positive bowel sounds, no organomegaly, no palpable masses, no guarding, no rebound tenderness  : Valencia catheter draining clear yellow  SKIN: No rash, no erythema, no lacerations or abrasions, no ecchymosis  NEUROLOGIC: cranial nerves 2-12 grossly intact         Data Review   Recent Results (from the past 24 hour(s))   URINALYSIS W/ RFLX MICROSCOPIC    Collection Time: 03/05/21 12:45 PM   Result Value Ref Range    Color YELLOW      Appearance CLEAR      Specific gravity 1.017 1.001 - 1.023      pH (UA) 5.5 5.0 - 9.0      Protein 30 (A) NEG mg/dL    Glucose Negative mg/dL    Ketone TRACE (A) NEG mg/dL    Bilirubin Negative NEG      Blood LARGE (A) NEG      Urobilinogen 1.0 0.2 - 1.0 EU/dL    Nitrites Negative NEG      Leukocyte Esterase SMALL (A) NEG      WBC 10-20 0 /hpf    RBC 20-50 0 /hpf    Epithelial cells 0 0 /hpf    Bacteria 0 0 /hpf    Casts 0 0 /lpf   GLUCOSE, POC    Collection Time: 03/05/21  1:17 PM   Result Value Ref Range    Glucose (POC) 133 (H) 65 - 100 mg/dL   CULTURE, URINE    Collection Time: 03/05/21  2:52 PM    Specimen: Urine    BLADDER   Result Value Ref Range    Special Requests: NO SPECIAL REQUESTS      Culture result: NO GROWTH 1 DAY     GLUCOSE, POC    Collection Time: 03/05/21  5:26 PM   Result Value Ref Range    Glucose (POC) 130 (H) 65 - 100 mg/dL   GLUCOSE, POC    Collection Time: 03/05/21  8:00 PM   Result Value Ref Range    Glucose (POC) 203 (H) 65 - 078 mg/dL   METABOLIC PANEL, BASIC    Collection Time: 03/06/21  6:58 AM   Result Value Ref Range    Sodium 140 138 - 145 mmol/L    Potassium 4.5 3.5 - 5.1 mmol/L    Chloride 112 (H) 98 - 107 mmol/L    CO2 18 (L) 21 - 32 mmol/L    Anion gap 10 7 - 16 mmol/L    Glucose 125 (H) 65 - 100 mg/dL    BUN 56 (H) 8 - 23 MG/DL    Creatinine 3.25 (H) 0.8 - 1.5 MG/DL    GFR est AA 23 (L) >60 ml/min/1.73m2    GFR est non-AA 19 (L) >60 ml/min/1.73m2    Calcium 8.3 8.3 - 10.4 MG/DL   CBC W/O DIFF    Collection Time: 03/06/21  6:58 AM   Result Value Ref Range    WBC 7.7 4.3 - 11.1 K/uL    RBC 4.56 4.23 - 5.6 M/uL    HGB 12.7 (L) 13.6 - 17.2 g/dL    HCT 39.7 (L) 41.1 - 50.3 %    MCV 87.1 79.6 - 97.8 FL    MCH 27.9 26.1 - 32.9 PG    MCHC 32.0 31.4 - 35.0 g/dL    RDW 13.9 11.9 - 14.6 %    PLATELET 299 857 - 162 K/uL    MPV 10.6 9.4 - 12.3 FL    ABSOLUTE NRBC 0.00 0.0 - 0.2 K/uL   GLUCOSE, POC    Collection Time: 03/06/21  7:58 AM   Result Value Ref Range    Glucose (POC) 128 (H) 65 - 100 mg/dL   GLUCOSE, POC    Collection Time: 03/06/21 10:49 AM   Result Value Ref Range    Glucose (POC) 236 (H) 65 - 100 mg/dL           Assessment:     Active Problems:    Right ureteral stone (3/5/2021)    Acute on chronic kidney injury    POD 1 s/p R ureteral stent placement and mccarthy catheter placement. Plan:     -Regular diet  -Trend Cr.   Repeat this PM and tomorrow AM to ensure a downward trend.    -Continue IVF for now  -OOB/Ambulate  -PO pain control  -Follow up culture and narrow antibiotics as indicated.  -Ambien for sleep per patient request.  Takes at home  -Dispo: Continue inpatient care due to Cr elevation. If trends down, possible D/C tomorrow. If not, then will involve nephrology. Colton Romero M.D.     [facility]  [address]  Phone: [phone]  Fax: [phone]

## 2021-03-06 NOTE — PROGRESS NOTES
Hourly rounds completed. Denies pain or needs @ this time. FC draining well with pink urine,  red flecks noted. Pt is currently resting in bed. Will give report to oncoming RN.

## 2021-03-07 VITALS
RESPIRATION RATE: 18 BRPM | SYSTOLIC BLOOD PRESSURE: 142 MMHG | WEIGHT: 185 LBS | TEMPERATURE: 98.2 F | HEIGHT: 69 IN | HEART RATE: 72 BPM | OXYGEN SATURATION: 98 % | BODY MASS INDEX: 27.4 KG/M2 | DIASTOLIC BLOOD PRESSURE: 80 MMHG

## 2021-03-07 LAB
ANION GAP SERPL CALC-SCNC: 8 MMOL/L (ref 7–16)
BUN SERPL-MCNC: 45 MG/DL (ref 8–23)
CALCIUM SERPL-MCNC: 8.5 MG/DL (ref 8.3–10.4)
CEA SERPL-MCNC: 0.5 NG/ML (ref 0–3)
CHLORIDE SERPL-SCNC: 113 MMOL/L (ref 98–107)
CO2 SERPL-SCNC: 20 MMOL/L (ref 21–32)
CREAT SERPL-MCNC: 2.63 MG/DL (ref 0.8–1.5)
GLUCOSE BLD STRIP.AUTO-MCNC: 135 MG/DL (ref 65–100)
GLUCOSE BLD STRIP.AUTO-MCNC: 213 MG/DL (ref 65–100)
GLUCOSE BLD STRIP.AUTO-MCNC: 98 MG/DL (ref 65–100)
GLUCOSE SERPL-MCNC: 108 MG/DL (ref 65–100)
MAGNESIUM SERPL-MCNC: 2.1 MG/DL (ref 1.8–2.4)
PHOSPHATE SERPL-MCNC: 3.3 MG/DL (ref 2.3–3.7)
POTASSIUM SERPL-SCNC: 4.4 MMOL/L (ref 3.5–5.1)
SODIUM SERPL-SCNC: 141 MMOL/L (ref 138–145)

## 2021-03-07 PROCEDURE — 96376 TX/PRO/DX INJ SAME DRUG ADON: CPT

## 2021-03-07 PROCEDURE — 84100 ASSAY OF PHOSPHORUS: CPT

## 2021-03-07 PROCEDURE — 99217 PR OBSERVATION CARE DISCHARGE MANAGEMENT: CPT | Performed by: UROLOGY

## 2021-03-07 PROCEDURE — 36415 COLL VENOUS BLD VENIPUNCTURE: CPT

## 2021-03-07 PROCEDURE — 74011636637 HC RX REV CODE- 636/637: Performed by: UROLOGY

## 2021-03-07 PROCEDURE — 74011250637 HC RX REV CODE- 250/637: Performed by: UROLOGY

## 2021-03-07 PROCEDURE — 74011250636 HC RX REV CODE- 250/636: Performed by: UROLOGY

## 2021-03-07 PROCEDURE — 86301 IMMUNOASSAY TUMOR CA 19-9: CPT

## 2021-03-07 PROCEDURE — 82962 GLUCOSE BLOOD TEST: CPT

## 2021-03-07 PROCEDURE — 99204 OFFICE O/P NEW MOD 45 MIN: CPT | Performed by: INTERNAL MEDICINE

## 2021-03-07 PROCEDURE — 99218 HC RM OBSERVATION: CPT

## 2021-03-07 PROCEDURE — 80048 BASIC METABOLIC PNL TOTAL CA: CPT

## 2021-03-07 PROCEDURE — 74011000250 HC RX REV CODE- 250: Performed by: UROLOGY

## 2021-03-07 PROCEDURE — 2709999900 HC NON-CHARGEABLE SUPPLY

## 2021-03-07 PROCEDURE — 82378 CARCINOEMBRYONIC ANTIGEN: CPT

## 2021-03-07 PROCEDURE — 96375 TX/PRO/DX INJ NEW DRUG ADDON: CPT

## 2021-03-07 PROCEDURE — 83735 ASSAY OF MAGNESIUM: CPT

## 2021-03-07 PROCEDURE — 82105 ALPHA-FETOPROTEIN SERUM: CPT

## 2021-03-07 RX ORDER — HYOSCYAMINE SULFATE 0.12 MG/1
0.12 TABLET SUBLINGUAL
Qty: 30 TAB | Refills: 1 | Status: SHIPPED | OUTPATIENT
Start: 2021-03-07 | End: 2021-03-23 | Stop reason: CLARIF

## 2021-03-07 RX ORDER — SULFAMETHOXAZOLE AND TRIMETHOPRIM 800; 160 MG/1; MG/1
1 TABLET ORAL 2 TIMES DAILY
Qty: 14 TAB | Refills: 0 | Status: SHIPPED | OUTPATIENT
Start: 2021-03-07 | End: 2021-03-13

## 2021-03-07 RX ORDER — OXYCODONE AND ACETAMINOPHEN 5; 325 MG/1; MG/1
1 TABLET ORAL
Qty: 20 TAB | Refills: 0 | Status: SHIPPED | OUTPATIENT
Start: 2021-03-07 | End: 2021-03-14

## 2021-03-07 RX ADMIN — CEFAZOLIN 2 G: 10 INJECTION, POWDER, FOR SOLUTION INTRAVENOUS at 05:22

## 2021-03-07 RX ADMIN — ONDANSETRON 4 MG: 2 INJECTION INTRAMUSCULAR; INTRAVENOUS at 06:00

## 2021-03-07 RX ADMIN — Medication 10 ML: at 14:46

## 2021-03-07 RX ADMIN — PANCRELIPASE LIPASE, PANCRELIPASE PROTEASE, PANCRELIPASE AMYLASE 1 CAPSULE: 20000; 63000; 84000 CAPSULE, DELAYED RELEASE ORAL at 08:09

## 2021-03-07 RX ADMIN — INSULIN GLARGINE 10 UNITS: 100 INJECTION, SOLUTION SUBCUTANEOUS at 08:14

## 2021-03-07 RX ADMIN — SODIUM CHLORIDE 75 ML/HR: 900 INJECTION, SOLUTION INTRAVENOUS at 10:39

## 2021-03-07 RX ADMIN — ASPIRIN 81 MG: 81 TABLET, CHEWABLE ORAL at 08:09

## 2021-03-07 RX ADMIN — CEFAZOLIN 2 G: 10 INJECTION, POWDER, FOR SOLUTION INTRAVENOUS at 14:41

## 2021-03-07 RX ADMIN — PANCRELIPASE LIPASE, PANCRELIPASE PROTEASE, PANCRELIPASE AMYLASE 1 CAPSULE: 20000; 63000; 84000 CAPSULE, DELAYED RELEASE ORAL at 12:23

## 2021-03-07 RX ADMIN — Medication 10 ML: at 05:23

## 2021-03-07 NOTE — CONSULTS
[facility] Hematology & Oncology: In Patient Hematology / Oncology Consult Note    Reason for Consult:  Liver mass  Referring Physician:  Christiana Burleson MD    History of Present Illness:  80 y.o. M admitted on 3/5/21 with a right ureteral stone. He has a PMH of BPH, CAD, BPH, diverticulitis, and previous TURP 20 years ago. He presented to the ER on 3/2 with severe right flank pain. He had a CT urogram on 3/2 which showed an obstructing 4 mm calculus in the proximal right ureter resulting in mild right hydroureteronephrosis with inflammatory stranding, new heterogenous liver mass up to 6.4 cm, chronic pancreatitis, and prostatomegaly with urinary bladder wall thickening. He had a cytoscopy on 3/5 and a right ureter stent placed with mccarthy insertion over wire. He was noticed to have an increase in Cr (2x BL) at 3.25, now improved today to 2.63. We were consulted given the new liver mass for our recommendations. Review of Systems:  Constitutional Denies fever or chills. Denies weight loss or appetite changes. Denies fatigue. Denies anorexia. HEENT Denies trauma, bluring vision, hearing loss, ear pain, nosebleeds, sore throat, neck pain and ear discharge. Skin Denies lesions or rashes. Lungs Denies shortness of breath, cough, sputum production or hemoptysis. Cardiovascular Denies chest pain, palpitations, orthopnea, claudication and leg swelling. Gastrointestinal Denies nausea, vomiting, bowel changes. Denies bloody or black stools. Denies abdominal pain.  Right flank pain, ureter stone. H/o TURP. Neuro Denies headaches, visual changes or ataxia. Denies dizziness, tingling, tremors, sensory change, speech change, focal weakness and headaches. Hematology Denies nasal/gum bleeding, denies easy bruise   Endo Denies heat/cold intolerance, denies diabetes. MSK Denies back pain, swollen legs, myalgias and falls. Psychiatric/Behavioral Denies depression and substance abuse.  The patient is not nervous/anxious. Allergies   Allergen Reactions    Iohexol Anaphylaxis    Other Medication Anaphylaxis     Omnipaque Rediflo 240 (contrast)    Atorvastatin Myalgia     Currently tolerating     Cipro [Ciprofloxacin] Hives    Codeine Unknown (comments)     Makes patient hyperactive    Iodinated Contrast Media Anaphylaxis    Penicillins Rash    Valdecoxib Hives and Rash     Past Medical History:   Diagnosis Date    Abnormal CT of liver 03/02/2021    New heterogeneous liver mass measuring up to 6.4 cm. Further evaluation required    AR (allergic rhinitis) 8/3/2016    Arthritis     Benign paroxysmal positional vertigo     Bilateral impacted cerumen     BPH (benign prostatic hyperplasia)     CAD (coronary artery disease)     Follow[facility] by Dr Vikas Tabares at [facility] Chronic otitis media    [address]  Diverticulitis 8/3/2016    DNS (deviated nasal septum)     ETD (eustachian tube dysfunction)     H/O heart artery stent     S/P RCA stent by Dr. Leobardo Gomez in 2003    Heart disease 8/3/2016    High frequency hearing loss     History of bilateral inguinal hernia repair     History of gastroesophageal reflux (GERD)     History of pancreatitis     History of squamous cell carcinoma     left forearm near wrist    Hypercholesterolemia     Hyperlipidemia     Hypertrophy of both inferior nasal turbinates     Kidney stone     MHL (mixed hearing loss)     NO (nasal obstruction)     Otitis media, nonsuppurative     SNHL (sensorineural hearing loss) 8/3/2016    Type 2 diabetes mellitus (HCC)     insulin reliant/AVG FBS: 100-120/s.s of hypoglycemia at 70/last A1c:7.5    Unspecified sleep apnea     no cpap;     Vertigo      Past Surgical History:   Procedure Laterality Date    HX APPENDECTOMY      HX CATARACT REMOVAL Bilateral     HX COLONOSCOPY      HX HERNIA REPAIR Bilateral     Dr Farzad Multani 30 years ago.     HX MASTOIDECTOMY  1996    right modified canal wall down    HX OTHER SURGICAL      tube right ear    HX OTHER SURGICAL      skin lesion; local excision: SCC    HX OTHER SURGICAL  1/14/03    Dupuytrens contracture release    HX TURP      HX VITRECTOMY      NV CARDIAC SURG PROCEDURE UNLIST      stent     Family History   Problem Relation Age of Onset    Cancer Maternal Grandmother         colon    Heart Disease Maternal Grandfather      Social History     Socioeconomic History    Marital status:      Spouse name: Not on file    Number of children: Not on file    Years of education: Not on file    Highest education level: Not on file   Occupational History    Not on file   Social Needs    Financial resource strain: Not on file    Food insecurity     Worry: Not on file     Inability: Not on file    Transportation needs     Medical: Not on file     Non-medical: Not on file   Tobacco Use    Smoking status: Never Smoker    Smokeless tobacco: Never Used   Substance and Sexual Activity    Alcohol use: No    Drug use: No    Sexual activity: Not on file   Lifestyle    Physical activity     Days per week: Not on file     Minutes per session: Not on file    Stress: Not on file   Relationships    Social connections     Talks on phone: Not on file     Gets together: Not on file     Attends Orthodox service: Not on file     Active member of club or organization: Not on file     Attends meetings of clubs or organizations: Not on file     Relationship status: Not on file    Intimate partner violence     Fear of current or ex partner: Not on file     Emotionally abused: Not on file     Physically abused: Not on file     Forced sexual activity: Not on file   Other Topics Concern    Not on file   Social History Narrative    Not on file     Current Facility-Administered Medications   Medication Dose Route Frequency Provider Last Rate Last Admin    tamsulosin (FLOMAX) capsule 0.8 mg  0.8 mg Oral QHS Slime Wheatley MD   0.8 mg at 03/06/21 6668    alum-mag hydroxide-simeth (MYLANTA) oral suspension 30 mL  30 mL Oral Q4H PRN Enrique Mcelroy MD   30 mL at 03/06/21 1455    zolpidem (AMBIEN) tablet 5 mg  5 mg Oral QHS PRN Enrique Mcelroy MD   5 mg at 03/06/21 2121    insulin glargine (LANTUS) injection 20 Units  20 Units SubCUTAneous BID Enrique Mcelroy MD   10 Units at 03/07/21 0814    oxyCODONE-acetaminophen (PERCOCET) 5-325 mg per tablet 1 Tab  1 Tab Oral Q4H PRN Enrique Mcelroy MD        aspirin chewable tablet 81 mg  81 mg Oral DAILY Enrique Mcelroy MD   81 mg at 03/07/21 0809    atorvastatin (LIPITOR) tablet 20 mg  20 mg Oral QHS Enrique Mcelroy MD   20 mg at 03/06/21 2122    insulin lispro (HUMALOG) injection 15 Units  15 Units SubCUTAneous Loreto West MD   Stopped at 03/06/21 1630    sodium chloride (NS) flush 5-40 mL  5-40 mL IntraVENous Q8H Enrique Mcelroy MD   10 mL at 03/07/21 0523    sodium chloride (NS) flush 5-40 mL  5-40 mL IntraVENous PRN Enrique Mcelroy MD        ceFAZolin (ANCEF) 2 g/20 mL in sterile water IV syringe  2 g IntraVENous Steven Parra MD   2 g at 03/07/21 0522    acetaminophen (TYLENOL) tablet 650 mg  650 mg Oral Q4H PRN Enrique Mcelroy MD        morphine injection 2 mg  2 mg IntraVENous Q1H PRN Enrique Mcelroy MD        [facility]) injection 0.4 mg  0.4 mg IntraVENous PRN Enrique Mcelroy MD        ondansetron [facility]) injection 4 mg  4 mg IntraVENous Q4H PRN Enrique Mcelroy MD   4 mg at 03/07/21 0600    senna-docusate (PERICOLACE) 8.6-50 mg per tablet 1 Tab  1 Tab Oral BID MAYEN Enrique Mcelroy MD        0.9% sodium chloride infusion  75 mL/hr IntraVENous CONTINUOUS Enrique Mcelroy MD 75 mL/hr at 03/07/21 1039 75 mL/hr at 03/07/21 1039    lipase-protease-amylase (ZENPEP 20,000) capsule 1 Cap  1 Cap Oral TID WITH MEALS Enrique Mcelroy MD   1 Cap at 03/07/21 0809       OBJECTIVE:  Patient Vitals for the past 8 hrs:   BP Temp Pulse Resp SpO2 Weight   03/07/21 0713 128/61 98.4 °F (36.9 °C) 73 17 94 %    21 0403 123/61 98.2 °F (36.8 °C) 85 17 95 % 185 lb (83.9 kg)     Temp (24hrs), Av.1 °F (36.7 °C), Min:97.7 °F (36.5 °C), Max:98.4 °F (36.9 °C)    701 - 1900  In: -   Out: 900 [Urine:900]    Physical Exam:  Constitutional: Oriented to person, place, and time. Well-developed and well-nourished. HEENT: Normocephalic and atraumatic. Oropharynx is clear and moist.   Conjunctivae and EOM are normal. Pupils are equal, round, and reactive to light. No scleral icterus. Neck supple. No JVD present. No tracheal deviation present. No thyromegaly present. Lymph node   No palpable submandibular, cervical, supraclavicular, axillary and inguinal lymph nodes. Skin Warm and dry. No bruising and no rash noted. No erythema. No pallor. Respiratory Effort normal and breath sounds normal.  No respiratory distress. No wheezes. No rales. No tenderness. CVS Normal rate, regular rhythm and normal heart sounds. Exam reveals no gallop, no friction and no rub. No murmur heard. Abdomen Soft. Bowel sounds are normal. Exhibits no distension. There is no tenderness. There is no rebound and no guarding. Neuro Normal reflexes. No cranial nerve deficit. Exhibits normal muscle tone, 5 of 5 strength of all extremities. MSK Normal range of motion. No edema and no tenderness.    Psych Normal mood, affect, behavior, judgment and thought content      Labs:    Recent Labs     21  0658   WBC 7.7   RBC 4.56   HGB 12.7*   HCT 39.7*   MCV 87.1   MCH 27.9   MCHC 32.0   RDW 13.9         Recent Labs     21  0713 21  19121  0658    140 140   K 4.4 4.4 4.5   * 111* 112*   CO2 20* 22 18*   AGAP 8 7 10   * 104* 125*   BUN 45* 49* 56*   CREA 2.63* 3.02* 3.25*   GFRAA 30* 25* 23*   GFRNA 25* 21* 19*   CA 8.5 8.4 8.3   MG 2.1 2.3  --    PHOS 3.3 3.5  --        ASSESSMENT/RECOMMENDATION:    Active Problems:    Right ureteral stone (3/5/2021)    80 y.o. M consulted for liver mass. He was admitted for right flank pain and CT showed right ureter stone, also incidentally found liver mass, I reviewed CT and discussed with pt to further evaluate with MRI or/and biopsy. He reports severe claustrophobia, but can not fo CT liver protocol given the Cr 2.66, willing to try MRI liver with ativan, understand will pursue liver mass biopsy if MRI fails or undiagnostic, follow in office after MRI. Thank you for allowing me to participate in the care of Mr. Hector Scott. Patricia Rogers M.D.   [address], [address] [place] [address]  Office : [phone]  Fax : [phone]

## 2021-03-07 NOTE — DISCHARGE SUMMARY
Physician Discharge Summary    Name: [facility] Record Number: [id number]       Account Number:  [de-identified]  YOB: 1933                         Age:  80 y.o. Admit date:  3/5/2021                    Discharge date:  3/7/2021    Attending Physician:  Rah Horan           Service: SURGERY    Physician Summary completed by: Cynthia Horan MD    Reason for hospitalization: Right Ureteral Stone and Liver Mass    Significant PMH:   Past Medical History:   Diagnosis Date    Abnormal CT of liver 03/02/2021    New heterogeneous liver mass measuring up to 6.4 cm. Further evaluation required    AR (allergic rhinitis) 8/3/2016    Arthritis     Benign paroxysmal positional vertigo     Bilateral impacted cerumen     BPH (benign prostatic hyperplasia)     CAD (coronary artery disease)     Followed by Dr Gisell Pink at [facility] Chronic otitis media    [address] [facility] Diverticulitis 8/3/2016    DNS (deviated nasal septum)     ETD (eustachian tube dysfunction)     H/O heart artery stent     S/P RCA stent by Dr. Natalie Farris in 2003    Heart disease 8/3/2016    High frequency hearing loss     History of bilateral inguinal hernia repair     History of gastroesophageal reflux (GERD)     History of pancreatitis     History of squamous cell carcinoma     left forearm near wrist    Hypercholesterolemia     Hyperlipidemia     Hypertrophy of both inferior nasal turbinates     Kidney stone     MHL (mixed hearing loss)     NO (nasal obstruction)     Otitis media, nonsuppurative     SNHL (sensorineural hearing loss) 8/3/2016    Type 2 diabetes mellitus (HCC)     insulin reliant/AVG FBS: 100-120/s.s of hypoglycemia at 70/last A1c:7.5    Unspecified sleep apnea     no cpap;     Vertigo        Allergies:    Allergies   Allergen Reactions    Iohexol Anaphylaxis    Other Medication Anaphylaxis     Omnipaque Rediflo 240 (contrast)    Atorvastatin Myalgia     Currently tolerating  Cipro [Ciprofloxacin] Hives    Codeine Unknown (comments)     Makes patient hyperactive    Iodinated Contrast Media Anaphylaxis    Penicillins Rash    Valdecoxib Hives and Rash       Brief Hospital Course: The patient was admitted and had the procedure listed below performed without difficulty. His hospital course progressed as expected. Cr was 3.25 on admission but trended down after stent placement towards baseline. Oncology was consulted for new 6 cm liver mass and recommended outpatient work up. [place] catheter was removed and he voided prior to discharge. Urine culture grew coag negative staph and he was discharged on PO antibiotics. He remained afebrile throughout his stay. No acute events occurred throughout his hospital stay. He was discharged in stable condition with the stent in place. He will be scheduled for TURP in the future and R URS/LL to treat stone and remove stent in the next few weeks. He also will be called by Oncology to set up outpatient follow up for the liver mass. Admission Physical Exam notable for:    Right Flank pain    Admission Lab/Radiology studies notable for:   CT with 6 cm liver mass, Cr of 3.25 on admission and CT with R obstructing ureteral stone. Surgical Procedures:  Cystoscopy, Right Ureteral Stent Placement    Condition at Discharge: Stable    Discharge Diagnoses:     Urinary tract infection without hematuria, site unspecified [N39.0]; Right ureteral stone [N20.1]    Significant Diagnostic Studies and Procedures:  Noted in brief hospital course. Consults:   Oncology - Liver Mass    Patient Disposition: home       Patient instructions/medications:    Current Facility-Administered Medications:     tamsulosin (FLOMAX) capsule 0.8 mg, 0.8 mg, Oral, QHS, Gabi Rojas MD, 0.8 mg at 03/06/21 2122    alum-mag hydroxide-simeth (MYLANTA) oral suspension 30 mL, 30 mL, Oral, Q4H PRN, Gabi Rojas MD, 30 mL at 03/06/21 6086    zolpidem (AMBIEN) tablet 5 mg, 5 mg, Oral, QHS PRN, Lien Contreras MD, 5 mg at 03/06/21 2121    insulin glargine (LANTUS) injection 20 Units, 20 Units, SubCUTAneous, BID, Lien Contreras MD, 10 Units at 03/07/21 0814    oxyCODONE-acetaminophen (PERCOCET) 5-325 mg per tablet 1 Tab, 1 Tab, Oral, Q4H PRN, Lien Contreras MD    aspirin chewable tablet 81 mg, 81 mg, Oral, DAILY, Lien Contreras MD, 81 mg at 03/07/21 0809    atorvastatin (LIPITOR) tablet 20 mg, 20 mg, Oral, QHS, Lien Contreras MD, 20 mg at 03/06/21 2122    insulin lispro (HUMALOG) injection 15 Units, 15 Units, SubCUTAneous, TIDAC, Lien Contreras MD, Stopped at 03/06/21 1630    sodium chloride (NS) flush 5-40 mL, 5-40 mL, IntraVENous, Q8H, Lien Contreras MD, 10 mL at 03/07/21 1446    sodium chloride (NS) flush 5-40 mL, 5-40 mL, IntraVENous, PRN, Lien Contreras MD    ceFAZolin (ANCEF) 2 g/20 mL in sterile water IV syringe, 2 g, IntraVENous, Q8H, Lien Contreras MD, 2 g at 03/07/21 1441    acetaminophen (TYLENOL) tablet 650 mg, 650 mg, Oral, Q4H PRN, Lien Contreras MD    morphine injection 2 mg, 2 mg, IntraVENous, Q1H PRN, Lien Contreras MD    naloxone [facility]) injection 0.4 mg, 0.4 mg, IntraVENous, PRN, Lien Contreras MD    ondansetron [facility]) injection 4 mg, 4 mg, IntraVENous, Q4H PRN, Lien Contreras MD, 4 mg at 03/07/21 0600    senna-docusate (PERICOLACE) 8.6-50 mg per tablet 1 Tab, 1 Tab, Oral, BID PRN, Lien Contreras MD    0.9% sodium chloride infusion, 75 mL/hr, IntraVENous, CONTINUOUS, Lien Contreras MD, Last Rate: 75 mL/hr at 03/07/21 1039, 75 mL/hr at 03/07/21 1039    lipase-protease-amylase (ZENPEP 20,000) capsule 1 Cap, 1 Cap, Oral, TID WITH MEALS, Lien Contreras MD, 1 Cap at 03/07/21 1223    Pending items needing follow up: Yuriy Quezada was instructed to follow up as indicated above. Signed:  An Vuong. Lady Trejo M.D.     [facility]  [facility]  [address] [facility], [address]  Phone: [phone]  Fax: [phone]    cc:  Primary Care Physician:  Verified  Referring physicians:     Additional provider(s):

## 2021-03-07 NOTE — PROGRESS NOTES
Pt is for discharge home today with no needs/supportive care orders recieved for CM at this time. Pt is to follow up with Oncology for evaluation and treatment plan for newly detected liver mass. Pt has a hx of squamous cell carcinoma.   Care Management Interventions  PCP Verified by CM: (Sandeep Santoyo MD)  Mode of Transport at Discharge: (family)  Transition of Care Consult (CM Consult): Discharge Planning(Pt is insured by medicare with a medicare supplement and pharmacy benefits. )  Discharge Durable Medical Equipment: No  Physical Therapy Consult: No  Occupational Therapy Consult: No  Speech Therapy Consult: No  Current Support Network: Own Home, Family Lives [place], Lives with Spouse  Confirm Follow Up Transport: Family  Name of the Patient Representative Who was Provided with a Choice of Provider and Agrees with the Discharge Plan: pt  [employer] Information Provided?: No  Discharge Location  Discharge Placement: Home

## 2021-03-07 NOTE — ROUTINE PROCESS
Discharge instructions reviewed with patient. Prescriptions given for hyoscyamine and med info sheets provided for all new medications. Opportunity for questions provided. Patient voiced understanding of all discharge instructions. Patient's IV removed. Sign pad unavailable at this time.

## 2021-03-07 NOTE — ROUTINE PROCESS
Verbal bedside report received from Anu, [facility]. Assumed care of patient.

## 2021-03-07 NOTE — DISCHARGE INSTRUCTIONS
Ureteral Stent Placement: What to Expect at [facility]     A ureteral (say \"you-REE-ter-ul\") stent is a thin, hollow tube that is placed in the ureter to help urine pass from the kidney into the bladder. Ureters are the tubes that connect the kidneys to the bladder. You may have a small amount of blood in your urine for 1 to 3 days after the procedure. While the stent is in place, you may have to urinate more often, feel a sudden need to urinate, or feel like you can't completely empty your bladder. You may feel some pain when you urinate or do strenuous activity. You also may notice a small amount of blood in your urine after strenuous activities. These side effects usually don't prevent people from doing their normal daily activities. You may have a thin string coming out of your urethra. Your urethra is the tube that carries urine from your bladder to outside your body. This string is attached to the stent. Try not to pull on the string. The doctor will use the string to pull out the stent when you no longer need it. After the procedure, urine may flow better from your kidneys to your bladder. A ureteral stent may be left in place for several days or for as long as several months. Your doctor will take it out when you no longer need it. This care sheet gives you a general idea about how long it will take for you to recover. But each person recovers at a different pace. Follow the steps below to get better as quickly as possible. How can you care for yourself at home? Activity    · Rest when you feel tired. Getting enough sleep will help you recover.     · Avoid strenuous activities, such as bicycle riding, jogging, weight lifting, or aerobic exercise, until your doctor says it is okay.     · Ask your doctor when you can drive again.     · Most people are able to return to work the day after the procedure.  If your work requires intense activity, you may feel pain in your kidney area or get tired easily. If this happens, you may need to do less strenuous activities while the stent is in.     · Ask your doctor when it is okay for you to have sex. Diet    · You can eat your normal diet. If your stomach is upset, try bland, low-fat foods like plain rice, broiled chicken, toast, and yogurt.     · Drink plenty of fluids (unless your doctor tells you not to). Medicines    · Your doctor will tell you if and when you can restart your medicines. You will also get instructions about taking any new medicines.     · If you take aspirin or some other blood thinner, ask your doctor if and when to start taking it again. Make sure that you understand exactly what your doctor wants you to do.     · Be safe with medicines. Take pain medicines exactly as directed. ? If the doctor gave you a prescription medicine for pain, take it as prescribed. ? If you are not taking a prescription pain medicine, ask your doctor if you can take an over-the-counter medicine.     · If you think your pain medicine is making you sick to your stomach:  ? Take your medicine after meals (unless your doctor has told you not to). ? Ask your doctor for a different pain medicine.     · If your doctor prescribed antibiotics, take them as directed. Do not stop taking them just because you feel better. You need to take the full course of antibiotics. Follow-up care is a key part of your treatment and safety. Be sure to make and go to all appointments, and call your doctor if you are having problems. It's also a good idea to know your test results and keep a list of the medicines you take. When should you call for help? Call 911 anytime you think you may need emergency care. For example, call if:    · You passed out (lost consciousness).     · You have severe trouble breathing.     · You have sudden chest pain and shortness of breath, or you cough up blood.     · You have severe belly pain.    Call your doctor now or seek immediate medical care if:    · Part or all of the stent comes out of your urethra.     · You have pain that does not get better after you take pain medicine.     · You have symptoms of a urinary infection. For example:  ? You have blood or pus in your urine. ? You have pain in your back just below your rib cage. This is called flank pain. ? You have a fever, chills, or body aches. ? It hurts to urinate. ? You have groin or belly pain.     · You cannot control when you urinate, or you leak urine. Watch closely for changes in your health, and be sure to contact your doctor if you have any problems. Where can you learn more? Go to http://www.gray.com/  Enter B869 in the search box to learn more about \"Ureteral Stent Placement: What to Expect at Home. \"  Current as of: June 29, 2020               Content Version: 12.6  © 2006-2020 [employer]. Care instructions adapted under license by [employer] (which disclaims liability or warranty for this information). If you have questions about a medical condition or this instruction, always ask your healthcare professional. [place] [address] any warranty or liability for your use of this information. Kidney Stone: Care Instructions  Your Care Instructions     Kidney stones are formed when salts, minerals, and other substances normally found in the urine clump together. They can be as small as grains of sand or, rarely, as large as golf balls. While the stone is traveling through the ureter, which is the tube that carries urine from the kidney to the bladder, you will probably feel pain. The pain may be mild or very severe. You may also have some blood in your urine. As soon as the stone reaches the bladder, any intense pain should go away. If a stone is too large to pass on its own, you may need a medical procedure to help you pass the stone. The doctor has checked you carefully, but problems can develop later. If you notice any problems or new symptoms, get medical treatment right away. Follow-up care is a key part of your treatment and safety. Be sure to make and go to all appointments, and call your doctor if you are having problems. It's also a good idea to know your test results and keep a list of the medicines you take. How can you care for yourself at home? · Drink plenty of fluids, enough so that your urine is light yellow or clear like water. If you have kidney, heart, or liver disease and have to limit fluids, talk with your doctor before you increase the amount of fluids you drink. · Take pain medicines exactly as directed. Call your doctor if you think you are having a problem with your medicine. ? If the doctor gave you a prescription medicine for pain, take it as prescribed. ? If you are not taking a prescription pain medicine, ask your doctor if you can take an over-the-counter medicine. Read and follow all instructions on the label. · Your doctor may ask you to strain your urine so that you can collect your kidney stone when it passes. You can use a kitchen strainer or a tea strainer to catch the stone. Store it in a plastic bag until you see your doctor again. Preventing future kidney stones  Some changes in your diet may help prevent kidney stones. Depending on the cause of your stones, your doctor may recommend that you:  · Drink plenty of fluids, enough so that your urine is light yellow or clear like water. If you have kidney, heart, or liver disease and have to limit fluids, talk with your doctor before you increase the amount of fluids you drink. · Limit coffee, tea, and alcohol. Also avoid grapefruit juice. · Do not take more than the recommended daily dose of vitamins C and D.  · Avoid antacids such as Gaviscon, Maalox, Mylanta, or Tums. · Limit the amount of salt (sodium) in your diet. · Eat a balanced diet that is not too high in protein.   · Limit foods that are high in a substance called oxalate, which can cause kidney stones. These foods include dark green vegetables, rhubarb, chocolate, wheat bran, nuts, cranberries, and beans. When should you call for help? Call your doctor now or seek immediate medical care if:    · You cannot keep down fluids.     · Your pain gets worse.     · You have a fever or chills.     · You have new or worse pain in your back just below your rib cage (the flank area).     · You have new or more blood in your urine. Watch closely for changes in your health, and be sure to contact your doctor if:    · You do not get better as expected. Where can you learn more? Go to http://www.gray.com/  Enter R496 in the search box to learn more about \"Kidney Stone: Care Instructions. \"  Current as of: April 15, 2020               Content Version: 12.6  © 1763-0380 [employer]. Care instructions adapted under license by [employer] (which disclaims liability or warranty for this information). If you have questions about a medical condition or this instruction, always ask your healthcare professional. [address] any warranty or liability for your use of this information. Sulfamethoxazole/Trimethoprim (By mouth)   Sulfamethoxazole (sul-fa-meth-OX-a-zole), Trimethoprim (trye-METH-oh-prim)  Treats or prevents infections. Brand Name(s): Bactrim, Bactrim DS, SMZ-TMP Pediatric, Sulfatrim, Sulfatrim Pediatric   There may be other brand names for this medicine. When This Medicine Should Not Be Used: This medicine is not right for everyone. Do not use it if you had an allergic reaction to trimethoprim, sulfamethoxazole, or any sulfa drug. Do not use this medicine if you are pregnant, if you have anemia caused by low levels of folic acid, or if you have a history of drug-induced thrombocytopenia. How to Use This Medicine:   Liquid, Tablet  · Your doctor will tell you how much medicine to use.  Do not use more than directed. · Measure the oral liquid medicine with a marked measuring spoon, oral syringe, or medicine cup. · Drink extra fluids so you will urinate more often and help prevent kidney problems. · Take all of the medicine in your prescription to clear up your infection, even if you feel better after the first few doses. · Missed dose: Take a dose as soon as you remember. If it is almost time for your next dose, wait until then and take a regular dose. Do not take extra medicine to make up for a missed dose. · Store the medicine in a closed container at room temperature, away from heat, moisture, and direct light. Do not freeze the oral liquid. Drugs and Foods to Avoid:   Ask your doctor or pharmacist before using any other medicine, including over-the-counter medicines, vitamins, and herbal products. · Some medicines can affect how this medicine works. Tell your doctor if you also use the following:   ¨ amantadine, cyclosporine, digoxin, indomethacin, memantine, methotrexate, phenytoin, pyrimethamine, or warfarin  ¨ an ACE inhibitor, diabetes medicine (glipizide, glyburide, metformin, pioglitazone, repaglinide, rosiglitazone), a diuretic (water pill, such as hydrochlorothiazide), or a tricyclic antidepressant  Warnings While Using This Medicine:   · It is not safe to take this medicine during pregnancy. It could harm an unborn baby. Tell your doctor right away if you become pregnant. · Tell your doctor if you are breastfeeding, or if you have kidney disease, liver disease, diabetes, malabsorption or malnutrition, folate deficiency, porphyria, thyroid problems, or a history of alcoholism. Tell your doctor if you have asthma or severe allergies, especially if you are allergic to any medicines. It is important for your doctor to know if you have HIV or AIDS, because this medicine might work differently for you. · This medicine may cause a severe allergic reaction.   · This medicine may lower the number of platelets in your body, which are necessary for proper blood clotting. This may cause you to bleed or get infections more easily. Talk with your doctor if you have concerns about this. · This medicine can cause diarrhea. Call your doctor if the diarrhea becomes severe, does not stop, or is bloody. Do not take any medicine to stop diarrhea until you have talked to your doctor. Diarrhea can occur 2 months or more after you stop taking this medicine. · Tell any doctor or dentist who treats you that you are using this medicine. This medicine may affect certain medical test results. · Your doctor will do lab tests at regular visits to check on the effects of this medicine. Keep all appointments. · Keep all medicine out of the reach of children. Never share your medicine with anyone. Possible Side Effects While Using This Medicine:   Call your doctor right away if you notice any of these side effects:  · Allergic reaction: Itching or hives, swelling in your face or hands, swelling or tingling in your mouth or throat, chest tightness, trouble breathing  · Blistering, peeling, or red skin rash  · Dark urine or pale stools, nausea, vomiting, loss of appetite, stomach pain, yellow skin or eyes  · Chest pain, cough, or trouble breathing  · Confusion, weakness  · Muscle twitching  · Severe diarrhea, stomach pain, cramps, bloating  · Skin rash, purple spots on your skin, or very pale or yellow skin  · Sore throat, fever, muscle pain  · Uneven heartbeat, numbness or tingling in your hands, feet, or lips  · Unusual bleeding, bruising, or weakness  If you notice these less serious side effects, talk with your doctor:   · Mild nausea, vomiting, or loss of appetite  If you notice other side effects that you think are caused by this medicine, tell your doctor. Call your doctor for medical advice about side effects.  You may report side effects to FDA at 8-829-FDA-2125  © 2017 [facility] Information is for End User's use only and may not be sold, redistributed or otherwise used for commercial purposes. The above information is an  only. It is not intended as medical advice for individual conditions or treatments. Talk to your doctor, nurse or pharmacist before following any medical regimen to see if it is safe and effective for you. Hyoscyamine (By mouth)   Hyoscyamine (egt-fs-JRY-a-meen)  Reduces muscle activity, including muscle spasms in the digestive system. Dries and reduces secretions, such as reducing acid in the stomach. May treat allergy symptoms and several other conditions. Brand Name(s): Anaspaz, Ed-Spaz, Hyosyne, Levbid, Levsin, Levsin/SL, NuLev, Oscimin, Oscimin-SR, Symax DuoTab, Symax FasTabs, Symax-SL, Symax-SR   There may be other brand names for this medicine. When This Medicine Should Not Be Used: This medicine is not right for everyone. Do not use it if you had an allergic reaction to hyoscyamine. How to Use This Medicine:   Tablet, Spray, Chewable Tablet, Dissolving Tablet, Long Acting Tablet, Long Acting Capsule, Liquid  · Your doctor will tell you how much medicine to use. Do not use more than directed. · Measure the oral liquid medicine with a marked measuring spoon, oral syringe, or medicine cup. If the medicine came with a dropper, use the dropper to measure each dose. · Make sure your hands are dry before you handle the disintegrating tablet. Peel back the foil from the blister pack, then remove the tablet. Do not push the tablet through the foil. Place the tablet in your mouth. After it has melted, swallow or take a drink of water. · Swallow the extended-release tablet whole. Do not crush, break, or chew it. · Tablet: You might need to take the regular tablet about 30 minutes to 1 hour before you eat a meal. Ask your pharmacist about your specific brand. · Missed dose: Take a dose as soon as you remember.  If it is almost time for your next dose, wait until then and take a regular dose. Do not take extra medicine to make up for a missed dose. · Store the medicine in a closed container at room temperature, away from heat, moisture, and direct light. Drugs and Foods to Avoid:   Ask your doctor or pharmacist before using any other medicine, including over-the-counter medicines, vitamins, and herbal products. · Some foods and medicines can affect how hyoscyamine works. Tell your doctor if you are also using any of the following:   ¨ Amantadine, ketoconazole, haloperidol, metoclopramide, potassium supplement  ¨ Antihistamine  ¨ Narcotic pain medicine  ¨ Phenothiazine medicine, including chlorpromazine, perphenazine, promethazine, prochlorperazine, thioridazine  ¨ MAO inhibitor  ¨ Tricyclic antidepressant  ¨ Medicine to treat or prevent diarrhea  · If you take an antacid, do not take it at the same time you take hyoscyamine. Take your hyoscyamine before a meal and then the antacid after the meal.  Warnings While Using This Medicine:   · Tell your doctor if you are pregnant or breastfeeding, or if you have glaucoma, trouble urinating, myasthenia gravis, overactive thyroid, kidney disease, high blood pressure, heart rhythm problems, heart disease, or autonomic neuropathy. Tell your doctor about all digestion problems, including colitis, reflux disease (GERD), blocked intestines, or gastric ulcer. · This medicine may cause you to sweat less and overheat. Avoid hot temperatures. · This medicine may make you dizzy or drowsy or give you blurred vision. Do not drive or do anything else that could be dangerous until you know how this medicine affects you. · Keep all medicine out of the reach of children. Never share your medicine with anyone.   Possible Side Effects While Using This Medicine:   Call your doctor right away if you notice any of these side effects:  · Allergic reaction: Itching or hives, swelling in your face or hands, swelling or tingling in your mouth or throat, chest tightness, trouble breathing  · Blurred vision that does not go away  · Fast heartbeat, dizziness  · Unusual behavior, such as confusion, memory loss, anxiety, trouble sleeping, or hallucinations  If you notice these less serious side effects, talk with your doctor:   · Dry mouth  If you notice other side effects that you think are caused by this medicine, tell your doctor. Call your doctor for medical advice about side effects. You may report side effects to FDA at 0-562-GOO-1005  © 2017 [facility] Information is for End User's use only and may not be sold, redistributed or otherwise used for commercial purposes. The above information is an  only. It is not intended as medical advice for individual conditions or treatments. Talk to your doctor, nurse or pharmacist before following any medical regimen to see if it is safe and effective for you.

## 2021-03-07 NOTE — PROGRESS NOTES
Urology Progress Note    Admit Date: 3/5/2021    Subjective:     Patient had some nausea and emesis this AM when taking all of his medications at one time on empty stomach. Cr down to 2.63 today from 3.25 yesterday. Tolerating stent. Catheter draining. Pain controlled. On regular diet  Ambulating. 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 Urine culture prelim with coag negative staph. On IV ancef. Objective:     Patient Vitals for the past 8 hrs:   BP Temp Pulse Resp SpO2 Weight   03/07/21 0713 128/61 98.4 °F (36.9 °C) 73 17 94 %    03/07/21 0403 123/61 98.2 °F (36.8 °C) 85 17 95 % 185 lb (83.9 kg)     No intake/output data recorded.   03/05 1901 - 03/07 0700  In: 65 [P.O.:480; I.V.:1108]  Out: 200 [Urine:3900]    Physical Exam:   Visit Vitals  /61 (BP 1 Location: Right upper arm, BP Patient Position: At rest)   Pulse 73   Temp 98.4 °F (36.9 °C)   Resp 17   Ht 5' 9\" (1.753 m)   Wt 185 lb (83.9 kg)   SpO2 94%   BMI 27.32 kg/m²        GENERAL: No acute distress, Awake, Alert, Oriented X 3, Gait normal  CARDIAC: regular rate and rhythm  CHEST AND LUNG: Easy work of breathing, clear to auscultation bilaterally, no cyanosis  ABDOMEN: soft, non tender, non-distended, positive bowel sounds, no organomegaly, no palpable masses, no guarding, no rebound tenderness  : Valencia catheter draining clear yellow  SKIN: No rash, no erythema, no lacerations or abrasions, no ecchymosis  NEUROLOGIC: cranial nerves 2-12 grossly intact         Data Review   Recent Results (from the past 24 hour(s))   GLUCOSE, POC    Collection Time: 03/06/21 10:49 AM   Result Value Ref Range    Glucose (POC) 236 (H) 65 - 100 mg/dL   GLUCOSE, POC    Collection Time: 03/06/21  3:48 PM   Result Value Ref Range    Glucose (POC) 104 (H) 65 - 832 mg/dL   METABOLIC PANEL, BASIC    Collection Time: 03/06/21  7:19 PM   Result Value Ref Range    Sodium 140 138 - 145 mmol/L    Potassium 4.4 3.5 - 5.1 mmol/L Chloride 111 (H) 98 - 107 mmol/L    CO2 22 21 - 32 mmol/L    Anion gap 7 7 - 16 mmol/L    Glucose 104 (H) 65 - 100 mg/dL    BUN 49 (H) 8 - 23 MG/DL    Creatinine 3.02 (H) 0.8 - 1.5 MG/DL    GFR est AA 25 (L) >60 ml/min/1.73m2    GFR est non-AA 21 (L) >60 ml/min/1.73m2    Calcium 8.4 8.3 - 10.4 MG/DL   MAGNESIUM    Collection Time: 03/06/21  7:19 PM   Result Value Ref Range    Magnesium 2.3 1.8 - 2.4 mg/dL   PHOSPHORUS    Collection Time: 03/06/21  7:19 PM   Result Value Ref Range    Phosphorus 3.5 2.3 - 3.7 MG/DL   GLUCOSE, POC    Collection Time: 03/06/21  8:27 PM   Result Value Ref Range    Glucose (POC) 110 (H) 65 - 971 mg/dL   METABOLIC PANEL, BASIC    Collection Time: 03/07/21  7:13 AM   Result Value Ref Range    Sodium 141 138 - 145 mmol/L    Potassium 4.4 3.5 - 5.1 mmol/L    Chloride 113 (H) 98 - 107 mmol/L    CO2 20 (L) 21 - 32 mmol/L    Anion gap 8 7 - 16 mmol/L    Glucose 108 (H) 65 - 100 mg/dL    BUN 45 (H) 8 - 23 MG/DL    Creatinine 2.63 (H) 0.8 - 1.5 MG/DL    GFR est AA 30 (L) >60 ml/min/1.73m2    GFR est non-AA 25 (L) >60 ml/min/1.73m2    Calcium 8.5 8.3 - 10.4 MG/DL   MAGNESIUM    Collection Time: 03/07/21  7:13 AM   Result Value Ref Range    Magnesium 2.1 1.8 - 2.4 mg/dL   PHOSPHORUS    Collection Time: 03/07/21  7:13 AM   Result Value Ref Range    Phosphorus 3.3 2.3 - 3.7 MG/DL   GLUCOSE, POC    Collection Time: 03/07/21  7:18 AM   Result Value Ref Range    Glucose (POC) 98 65 - 100 mg/dL           Assessment:     Active Problems:    Right ureteral stone (3/5/2021)    Acute on chronic kidney injury    POD 2 s/p R ureteral stent placement and mccarthy catheter placement. Plan:     -Regular diet  -Trend Cr.  -Mccarthy out this AM  -Continue IVF for now  -OOB/Ambulate  -PO pain control  -Follow up culture and narrow antibiotics as indicated. -Oncology consult this AM for new liver mass.   I reviewed CT findings with patient this AM.  Will defer further work up to Oncology for this.  -Ambien for sleep per patient request.  Takes at home  -Dispo: Possible D/C today vs. Tomorrow depending on Oncology recommendations. Philip A. Demetra Severance, M.D.     [facility]  [address]  Phone: [phone]  Fax: [phone]

## 2021-03-07 NOTE — MANAGEMENT PLAN
[facility] Hematology & Oncology        Inpatient Hematology / Oncology Plan of Care    Reason for Consult:  Urinary tract infection without hematuria, site unspecified [N39.0]  Right ureteral stone [N20.1]  Referring Physician:  Holly Plata MD    History of Present Illness:  Mr. Beverly Ascencio is a 80 y.o. male admitted on 3/5/2021 with a primary diagnosis of Diagnoses of Right ureteral stone and Liver mass were pertinent to this visit. .      Mr. Beverly Ascencio was admitted on 3/5/21 with a right ureteral stone. He has a PMH of BPH, CAD, BPH, diverticulitis, and previous TURP 20 years ago. He presented to the ER on 3/2 with severe right flank pain. He had a CT urogram on 3/2 which showed an obstructing 4 mm calculus in the proximal right ureter resulting in mild right hydroureteronephrosis with inflammatory stranding, new heterogenous liver mass up to 6.4 cm, chronic pancreatitis, and prostatomegaly with urinary bladder wall thickening. He had a cytoscopy on 3/5 and a right ureter stent placed with mccarthy insertion over wire. He was noticed to have an increase in Cr (2x BL) at 3.25, now improved today to 2.63. We were consulted given the new liver mass for our recommendations. Review of Systems:  Pt not seen by me today in person. Allergies   Allergen Reactions    Iohexol Anaphylaxis    Other Medication Anaphylaxis     Omnipaque Rediflo 240 (contrast)    Atorvastatin Myalgia     Currently tolerating     Cipro [Ciprofloxacin] Hives    Codeine Unknown (comments)     Makes patient hyperactive    Iodinated Contrast Media Anaphylaxis    Penicillins Rash    Valdecoxib Hives and Rash     Past Medical History:   Diagnosis Date    Abnormal CT of liver 03/02/2021    New heterogeneous liver mass measuring up to 6.4 cm.  Further evaluation required    AR (allergic rhinitis) 8/3/2016    Arthritis     Benign paroxysmal positional vertigo     Bilateral impacted cerumen     BPH (benign prostatic hyperplasia)     CAD (coronary artery disease)     [facility] Bina Cruz [facility] Chronic otitis media     Claustrophobia     Diverticulitis 8/3/2016    DNS (deviated nasal septum)     ETD (eustachian tube dysfunction)     H/O heart artery stent     S/P RCA stent by Dr. Joseph Lindo in 2003    Heart disease 8/3/2016    High frequency hearing loss     History of bilateral inguinal hernia repair     History of gastroesophageal reflux (GERD)     History of pancreatitis     History of squamous cell carcinoma     left forearm near wrist    Hypercholesterolemia     Hyperlipidemia     Hypertrophy of both inferior nasal turbinates     Kidney stone     MHL (mixed hearing loss)     NO (nasal obstruction)     Otitis media, nonsuppurative     SNHL (sensorineural hearing loss) 8/3/2016    Type 2 diabetes mellitus (HCC)     insulin reliant/AVG FBS: 100-120/s.s of hypoglycemia at 70/last A1c:7.5    Unspecified sleep apnea     no cpap;     Vertigo      Past Surgical History:   Procedure Laterality Date    HX APPENDECTOMY      HX CATARACT REMOVAL Bilateral     HX COLONOSCOPY      HX HERNIA REPAIR Bilateral     Dr Joann Mcelroy 30 years ago.     HX MASTOIDECTOMY  1996    right modified canal wall down    HX OTHER SURGICAL      tube right ear    HX OTHER SURGICAL      skin lesion; local excision: SCC    HX OTHER SURGICAL  1/14/03    Dupuytrens contracture release    HX TURP      HX VITRECTOMY      [place] CARDIAC SURG PROCEDURE UNLIST      stent     Family History   Problem Relation Age of Onset    Cancer Maternal Grandmother         colon    Heart Disease Maternal Grandfather      Social History     Socioeconomic History    Marital status:      Spouse name: Not on file    Number of children: Not on file    Years of education: Not on file    Highest education level: Not on file   Occupational History    Not on file   Social Needs    Financial resource strain: Not on file    Food insecurity Worry: Not on file     Inability: Not on file    Transportation needs     Medical: Not on file     Non-medical: Not on file   Tobacco Use    Smoking status: Never Smoker    Smokeless tobacco: Never Used   Substance and Sexual Activity    Alcohol use: No    Drug use: No    Sexual activity: Not on file   Lifestyle    Physical activity     Days per week: Not on file     Minutes per session: Not on file    Stress: Not on file   Relationships    Social connections     Talks on phone: Not on file     Gets together: Not on file     Attends Bahai service: Not on file     Active member of club or organization: Not on file     Attends meetings of clubs or organizations: Not on file     Relationship status: Not on file    Intimate partner violence     Fear of current or ex partner: Not on file     Emotionally abused: Not on file     Physically abused: Not on file     Forced sexual activity: Not on file   Other Topics Concern    Not on file   Social History Narrative    Not on file     Current Facility-Administered Medications   Medication Dose Route Frequency Provider Last Rate Last Admin    tamsulosin (FLOMAX) capsule 0.8 mg  0.8 mg Oral QHS Robyne Goldberg, MD   0.8 mg at 03/06/21 2122    alum-mag hydroxide-simeth (MYLANTA) oral suspension 30 mL  30 mL Oral Q4H PRN Robyne Goldberg, MD   30 mL at 03/06/21 1455    zolpidem (AMBIEN) tablet 5 mg  5 mg Oral QHS PRN Robyne Goldberg, MD   5 mg at 03/06/21 2121    insulin glargine (LANTUS) injection 20 Units  20 Units SubCUTAneous BID Robyne Goldberg, MD   10 Units at 03/07/21 0814    oxyCODONE-acetaminophen (PERCOCET) 5-325 mg per tablet 1 Tab  1 Tab Oral Q4H PRN Robyne Goldberg, MD        aspirin chewable tablet 81 mg  81 mg Oral DAILY Robyne Goldberg, MD   81 mg at 03/07/21 0809    atorvastatin (LIPITOR) tablet 20 mg  20 mg Oral QHS Robyne Goldberg, MD   20 mg at 03/06/21 2122    insulin lispro (HUMALOG) injection 15 Units  15 Units SubCUTAneous Jaya Elias MD   Stopped at 21 1630    sodium chloride (NS) flush 5-40 mL  5-40 mL IntraVENous Q8H Darrel Marin MD   10 mL at 21 0523    sodium chloride (NS) flush 5-40 mL  5-40 mL IntraVENous PRN Darrel Marin MD        ceFAZolin (ANCEF) 2 g/20 mL in sterile water IV syringe  2 g IntraVENous Q8H Darrel Marin MD   2 g at 21 0522    acetaminophen (TYLENOL) tablet 650 mg  650 mg Oral Q4H PRN Darrel Marin MD        morphine injection 2 mg  2 mg IntraVENous Q1H PRN Darrel Marin MD        [facility]) injection 0.4 mg  0.4 mg IntraVENous PRN Darrel Marin MD        ondansetron [facility]) injection 4 mg  4 mg IntraVENous Q4H PRN Darrel Marin MD   4 mg at 21 0600    senna-docusate (PERICOLACE) 8.6-50 mg per tablet 1 Tab  1 Tab Oral BID PRN Darrel Marin MD        0.9% sodium chloride infusion  75 mL/hr IntraVENous CONTINUOUS Darrel Marin MD 75 mL/hr at 21 1039 75 mL/hr at 21 1039    lipase-protease-amylase (ZENPEP 20,000) capsule 1 Cap  1 Cap Oral TID WITH MEALS Darrel Marin MD   1 Cap at 21 1223       OBJECTIVE:  Patient Vitals for the past 8 hrs:   BP Temp Pulse Resp SpO2   21 1159 (!) 130/58 97.9 °F (36.6 °C) 67 18 95 %   21 0713 128/61 98.4 °F (36.9 °C) 73 17 94 %     Temp (24hrs), Av.1 °F (36.7 °C), Min:97.7 °F (36.5 °C), Max:98.4 °F (36.9 °C)     0701 -  1900  In: -   Out: 900 [Urine:900]    Physical Exam:  Pt not seen by me today--no physical exam done.       Labs:    Recent Results (from the past 24 hour(s))   GLUCOSE, POC    Collection Time: 21  3:48 PM   Result Value Ref Range    Glucose (POC) 104 (H) 65 - 781 mg/dL   METABOLIC PANEL, BASIC    Collection Time: 21  7:19 PM   Result Value Ref Range    Sodium 140 138 - 145 mmol/L    Potassium 4.4 3.5 - 5.1 mmol/L    Chloride 111 (H) 98 - 107 mmol/L    CO2 22 21 - 32 mmol/L    Anion gap 7 7 - 16 mmol/L    Glucose 104 (H) 65 - 100 mg/dL    BUN 49 (H) 8 - 23 MG/DL    Creatinine 3.02 (H) 0.8 - 1.5 MG/DL    GFR est AA 25 (L) >60 ml/min/1.73m2    GFR est non-AA 21 (L) >60 ml/min/1.73m2    Calcium 8.4 8.3 - 10.4 MG/DL   MAGNESIUM    Collection Time: 03/06/21  7:19 PM   Result Value Ref Range    Magnesium 2.3 1.8 - 2.4 mg/dL   PHOSPHORUS    Collection Time: 03/06/21  7:19 PM   Result Value Ref Range    Phosphorus 3.5 2.3 - 3.7 MG/DL   GLUCOSE, POC    Collection Time: 03/06/21  8:27 PM   Result Value Ref Range    Glucose (POC) 110 (H) 65 - 548 mg/dL   METABOLIC PANEL, BASIC    Collection Time: 03/07/21  7:13 AM   Result Value Ref Range    Sodium 141 138 - 145 mmol/L    Potassium 4.4 3.5 - 5.1 mmol/L    Chloride 113 (H) 98 - 107 mmol/L    CO2 20 (L) 21 - 32 mmol/L    Anion gap 8 7 - 16 mmol/L    Glucose 108 (H) 65 - 100 mg/dL    BUN 45 (H) 8 - 23 MG/DL    Creatinine 2.63 (H) 0.8 - 1.5 MG/DL    GFR est AA 30 (L) >60 ml/min/1.73m2    GFR est non-AA 25 (L) >60 ml/min/1.73m2    Calcium 8.5 8.3 - 10.4 MG/DL   MAGNESIUM    Collection Time: 03/07/21  7:13 AM   Result Value Ref Range    Magnesium 2.1 1.8 - 2.4 mg/dL   PHOSPHORUS    Collection Time: 03/07/21  7:13 AM   Result Value Ref Range    Phosphorus 3.3 2.3 - 3.7 MG/DL   GLUCOSE, POC    Collection Time: 03/07/21  7:18 AM   Result Value Ref Range    Glucose (POC) 98 65 - 100 mg/dL   GLUCOSE, POC    Collection Time: 03/07/21 12:02 PM   Result Value Ref Range    Glucose (POC) 135 (H) 65 - 100 mg/dL       Imaging:  EXAM: CT of the abdomen and pelvis without contrast.  Indication: Right flank pain  Comparison: CT abdomen and pelvis, 12/4/2016     Multiple axial images were obtained through the abdomen and pelvis without IV  contrast.  Radiation dose reduction techniques were used for this study:   All CT  scans performed at this facility use one or all of the following: Automated  exposure control, adjustment of the mA and/or kVp according to patient's size,  iterative reconstruction.     FINDINGS:  LOWER THORAX: There is subsegmental atelectasis/scarring involving the lung  bases with elevation of the left hemidiaphragm.     LIVER: There is a new heterogeneous 6.4 x 3.7 x 5.3 cm mass along the inferior  tip of the right hepatic lobe. No other definitive liver lesions are seen though  evaluation is limited without IV contrast.     BILIARY: The gallbladder is normal. No biliary duct dilation.     SPLEEN: No splenomegaly.     PANCREAS: Sequela of chronic pancreatitis with numerous pancreatic parenchymal  calcifications several calcifications in a linear distribution in the pancreatic  head likely represent pancreatic duct calculi. No acute inflammatory changes.     ADRENALS: No adrenal nodule or adrenal hypertrophy.     URINARY SYSTEM: There are multiple simple cysts bilaterally. There is an  obstructing 4 mm calculus in the proximal right ureter resulting in mild right  hydroureteronephrosis with right perinephric inflammatory stranding. There is an  additional nonobstructing right interpolar calculus measuring 3 mm. No left  renal calculi. There is mild diffuse urinary bladder wall thickening without  significant surrounding inflammatory changes.     BOWEL: Stomach, small bowel, and large bowel are normal in course and caliber. No evidence of obstruction. The appendix is surgically absent. Colonic  diverticulosis without evidence of acute diverticulitis.     VASCULAR: The abdominal aorta and iliac arterial system are nonaneurysmal with  advanced atherosclerosis.     NODES: No lymphadenopathy.     FLUID:  No free fluid.     REPRODUCTIVE: Prostatomegaly.     BONES/SOFT TISSUE: Small bilateral fat-containing inguinal hernias. Prior lower  abdominal wall hernia repair. No acute or aggressive osseous abnormality.        IMPRESSION     1. Obstructing 4 mm calculus in the proximal right ureter resulting in mild  right hydroureteronephrosis with perinephric inflammatory stranding.   2. Additional nonobstructing 3 mm right renal calculus. 3. New heterogeneous liver mass measuring up to 6.4 cm. Differential  considerations include metastatic and primary liver neoplasms. Evaluation of the  solid organs is limited without IV contrast. Consider follow-up examination with  contrast.  4. Chronic pancreatitis. 5. Prostatomegaly with urinary bladder wall thickening, possibly related to  chronic outlet obstruction.     ASSESSMENT:  Problem List  Date Reviewed: 3/3/2021          Codes Class Noted    Right ureteral stone ICD-10-CM: N20.1  ICD-9-CM: 592.1  3/5/2021        Mixed hyperlipidemia ICD-10-CM: E78.2  ICD-9-CM: 272.2  11/18/2020        Family history of MI (myocardial infarction) ICD-10-CM: Z82.49  ICD-9-CM: V17.3  11/18/2020        Essential hypertension with goal blood pressure less than 130/85 ICD-10-CM: I10  ICD-9-CM: 401.9  11/18/2020        Type [facility] mellitus without complication [facility] GFT-18-FY: E11.9  ICD-9-CM: 250.00  3/5/2019        Atherosclerosis of native coronary artery of native heart ICD-10-CM: I25.10  ICD-9-CM: 414.01  3/5/2019        History of PTCA ICD-10-CM: Z98.61  ICD-9-CM: V45.82  2/7/2019        Coronary artery disease involving native heart ICD-10-CM: I25.10  ICD-9-CM: 414.01  2/7/2019        Coronary artery disease involving native coronary artery of native heart without angina pectoris ICD-10-CM: I25.10  ICD-9-CM: 414.01  2/7/2019        Chest pain at rest ICD-10-CM: R07.9  ICD-9-CM: 786.50  2/7/2019        Personal history of allergy to radiographic contrast media - anaphylaxis ICD-10-CM: Z91.041  ICD-9-CM: V15.08  2/7/2019        Stage 3 chronic kidney disease ICD-10-CM: N18.30  ICD-9-CM: 585.3  2/7/2019        Gastroenteritis ICD-10-CM: K52.9  ICD-9-CM: 558.9  12/6/2016        Vasovagal syncope ICD-10-CM: R55  ICD-9-CM: 780.2  12/5/2016        Dizziness (Chronic) ICD-10-CM: R42  ICD-9-CM: 780.4  12/5/2016        Weakness generalized ICD-10-CM: R53.1  ICD-9-CM: 780.79  12/5/2016        Heart disease ICD-10-CM: I51.9  ICD-9-CM: 429.9  8/3/2016        Diverticulitis ICD-10-CM: K57.92  ICD-9-CM: 562.11  8/3/2016        AR (allergic rhinitis) ICD-10-CM: J30.9  ICD-9-CM: 477.9  8/3/2016        SNHL (sensorineural hearing loss) ICD-10-CM: H90.5  ICD-9-CM: 389.10  8/3/2016        Chronic otitis media ICD-10-CM: H66.90  ICD-9-CM: 382.9  Unknown        Hypertrophy of both inferior nasal turbinates ICD-10-CM: J34.3  ICD-9-CM: 478.0  Unknown        Inguinal hernia, left ICD-10-CM: K40.90  ICD-9-CM: 550.90  5/1/2014        Acute pancreatitis ICD-10-CM: K85.90  ICD-9-CM: 791.7  8/1/2012        Diabetes mellitus ([facility] (Chronic) ICD-10-CM: E11.9  ICD-9-CM: 250.00  8/1/2012        Hyperlipidemia (Chronic) ICD-10-CM: E78.5  ICD-9-CM: 272.4  8/1/2012        CAD (coronary artery disease) (Chronic) ICD-10-CM: I25.10  ICD-9-CM: 414.00  8/1/2012                RECOMMENDATIONS:  · Full plan will follow in Dr. Sharon Fischer note  · Check tumor markers: CEA, CA 19-9, AFP. Lab studies and imaging studies (CT) were personally reviewed. Pertinent old records were reviewed. Thank you for allowing us to participate in the care of Mr. Beverley Michael. Formal consult note by Dr. Jesse Jaimes to follow.          Ellsworth Councilman, ELENI   [facility] Hematology & Oncology  [address] [address]  Office : [phone]  Fax : [phone]

## 2021-03-07 NOTE — PROGRESS NOTES
Hourly rounds completed. Vomited once and complained of nausea this morning, medicated per MAR. Tolerated IV antibiotic. [place] intact. Pt is currently resting in bed. Call light within reach, will give report to oncoming RN.

## 2021-03-08 LAB
AFP-TM SERPL-MCNC: 2.8 NG/ML
BACTERIA SPEC CULT: ABNORMAL
CANCER AG19-9 SERPL-ACNC: 20.4 U/ML (ref 2–37)
SERVICE CMNT-IMP: ABNORMAL

## 2021-03-10 ENCOUNTER — HOSPITAL ENCOUNTER (INPATIENT)
Age: 86
LOS: 3 days | Discharge: HOME | DRG: 381 | End: 2021-03-13
Attending: EMERGENCY MEDICINE | Admitting: INTERNAL MEDICINE
Payer: MEDICARE

## 2021-03-10 ENCOUNTER — HOSPITAL ENCOUNTER (OUTPATIENT)
Dept: SURGERY | Age: 86
Discharge: HOME | End: 2021-03-10

## 2021-03-10 ENCOUNTER — ANESTHESIA EVENT (OUTPATIENT)
Dept: ENDOSCOPY | Age: 86
DRG: 381 | End: 2021-03-10
Payer: MEDICARE

## 2021-03-10 ENCOUNTER — ANESTHESIA (OUTPATIENT)
Dept: ENDOSCOPY | Age: 86
DRG: 381 | End: 2021-03-10
Payer: MEDICARE

## 2021-03-10 DIAGNOSIS — N18.9 CHRONIC KIDNEY DISEASE, UNSPECIFIED CKD STAGE: ICD-10-CM

## 2021-03-10 DIAGNOSIS — K92.0 HEMATEMESIS WITH NAUSEA: Primary | ICD-10-CM

## 2021-03-10 DIAGNOSIS — R16.0 LIVER MASS: ICD-10-CM

## 2021-03-10 PROBLEM — K92.2 GI BLEED: Status: ACTIVE | Noted: 2021-03-10

## 2021-03-10 LAB
ALBUMIN SERPL-MCNC: 2.1 G/DL (ref 3.2–4.6)
ALBUMIN SERPL-MCNC: 2.6 G/DL (ref 3.2–4.6)
ALBUMIN/GLOB SERPL: 0.7 {RATIO} (ref 1.2–3.5)
ALBUMIN/GLOB SERPL: 0.8 {RATIO} (ref 1.2–3.5)
ALP SERPL-CCNC: 215 U/L (ref 50–136)
ALP SERPL-CCNC: 317 U/L (ref 50–136)
ALT SERPL-CCNC: 110 U/L (ref 12–65)
ALT SERPL-CCNC: 69 U/L (ref 12–65)
ANION GAP SERPL CALC-SCNC: 8 MMOL/L (ref 7–16)
AST SERPL-CCNC: 158 U/L (ref 15–37)
AST SERPL-CCNC: 74 U/L (ref 15–37)
BASOPHILS # BLD: 0.1 K/UL (ref 0–0.2)
BASOPHILS NFR BLD: 1 % (ref 0–2)
BILIRUB DIRECT SERPL-MCNC: <0.1 MG/DL
BILIRUB SERPL-MCNC: 0.2 MG/DL (ref 0.2–1.1)
BILIRUB SERPL-MCNC: 0.3 MG/DL (ref 0.2–1.1)
BUN SERPL-MCNC: 62 MG/DL (ref 8–23)
CALCIUM SERPL-MCNC: 8.5 MG/DL (ref 8.3–10.4)
CHLORIDE SERPL-SCNC: 107 MMOL/L (ref 98–107)
CO2 SERPL-SCNC: 23 MMOL/L (ref 21–32)
CREAT SERPL-MCNC: 2.6 MG/DL (ref 0.8–1.5)
DIFFERENTIAL METHOD BLD: ABNORMAL
EOSINOPHIL # BLD: 0.1 K/UL (ref 0–0.8)
EOSINOPHIL NFR BLD: 1 % (ref 0.5–7.8)
ERYTHROCYTE [DISTWIDTH] IN BLOOD BY AUTOMATED COUNT: 13.8 % (ref 11.9–14.6)
GLOBULIN SER CALC-MCNC: 2.5 G/DL (ref 2.3–3.5)
GLOBULIN SER CALC-MCNC: 3.7 G/DL (ref 2.3–3.5)
GLUCOSE BLD STRIP.AUTO-MCNC: 246 MG/DL (ref 65–100)
GLUCOSE BLD STRIP.AUTO-MCNC: 270 MG/DL (ref 65–100)
GLUCOSE BLD STRIP.AUTO-MCNC: 283 MG/DL (ref 65–100)
GLUCOSE SERPL-MCNC: 206 MG/DL (ref 65–100)
HAV IGM SER QL: NONREACTIVE
HBV CORE IGM SER QL: NONREACTIVE
HBV SURFACE AG SER QL: NONREACTIVE
HCT VFR BLD AUTO: 23.1 % (ref 41.1–50.3)
HCT VFR BLD AUTO: 31.5 % (ref 41.1–50.3)
HCV AB SER QL: NONREACTIVE
HGB BLD-MCNC: 10 G/DL (ref 13.6–17.2)
HGB BLD-MCNC: 7.1 G/DL (ref 13.6–17.2)
IMM GRANULOCYTES # BLD AUTO: 0.1 K/UL (ref 0–0.5)
IMM GRANULOCYTES NFR BLD AUTO: 1 % (ref 0–5)
INR PPP: 1.1
LYMPHOCYTES # BLD: 1.5 K/UL (ref 0.5–4.6)
LYMPHOCYTES NFR BLD: 11 % (ref 13–44)
MCH RBC QN AUTO: 27.9 PG (ref 26.1–32.9)
MCHC RBC AUTO-ENTMCNC: 31.7 G/DL (ref 31.4–35)
MCV RBC AUTO: 88 FL (ref 79.6–97.8)
MONOCYTES # BLD: 0.9 K/UL (ref 0.1–1.3)
MONOCYTES NFR BLD: 7 % (ref 4–12)
NEUTS SEG # BLD: 10.4 K/UL (ref 1.7–8.2)
NEUTS SEG NFR BLD: 80 % (ref 43–78)
NRBC # BLD: 0 K/UL (ref 0–0.2)
PLATELET # BLD AUTO: 284 K/UL (ref 150–450)
PMV BLD AUTO: 10.3 FL (ref 9.4–12.3)
POTASSIUM SERPL-SCNC: 4.5 MMOL/L (ref 3.5–5.1)
PROT SERPL-MCNC: 4.6 G/DL (ref 6.3–8.2)
PROT SERPL-MCNC: 6.3 G/DL (ref 6.3–8.2)
PROTHROMBIN TIME: 14.3 SEC (ref 12.5–14.7)
RBC # BLD AUTO: 3.58 M/UL (ref 4.23–5.6)
SODIUM SERPL-SCNC: 138 MMOL/L (ref 138–145)
WBC # BLD AUTO: 13 K/UL (ref 4.3–11.1)

## 2021-03-10 PROCEDURE — 80074 ACUTE HEPATITIS PANEL: CPT

## 2021-03-10 PROCEDURE — 65270000029 HC RM PRIVATE

## 2021-03-10 PROCEDURE — 74011000250 HC RX REV CODE- 250: Performed by: STUDENT IN AN ORGANIZED HEALTH CARE EDUCATION/TRAINING PROGRAM

## 2021-03-10 PROCEDURE — 96374 THER/PROPH/DIAG INJ IV PUSH: CPT

## 2021-03-10 PROCEDURE — 77030039425 HC BLD LARYNG TRULITE DISP TELE -A: Performed by: STUDENT IN AN ORGANIZED HEALTH CARE EDUCATION/TRAINING PROGRAM

## 2021-03-10 PROCEDURE — 77030019908 HC STETH ESOPH SIMS -A: Performed by: STUDENT IN AN ORGANIZED HEALTH CARE EDUCATION/TRAINING PROGRAM

## 2021-03-10 PROCEDURE — 74011250636 HC RX REV CODE- 250/636: Performed by: STUDENT IN AN ORGANIZED HEALTH CARE EDUCATION/TRAINING PROGRAM

## 2021-03-10 PROCEDURE — 85014 HEMATOCRIT: CPT

## 2021-03-10 PROCEDURE — 82105 ALPHA-FETOPROTEIN SERUM: CPT

## 2021-03-10 PROCEDURE — 74011636637 HC RX REV CODE- 636/637: Performed by: ANESTHESIOLOGY

## 2021-03-10 PROCEDURE — 85025 COMPLETE CBC W/AUTO DIFF WBC: CPT

## 2021-03-10 PROCEDURE — 74011250636 HC RX REV CODE- 250/636: Performed by: INTERNAL MEDICINE

## 2021-03-10 PROCEDURE — 96375 TX/PRO/DX INJ NEW DRUG ADDON: CPT

## 2021-03-10 PROCEDURE — 76060000032 HC ANESTHESIA 0.5 TO 1 HR: Performed by: INTERNAL MEDICINE

## 2021-03-10 PROCEDURE — 80076 HEPATIC FUNCTION PANEL: CPT

## 2021-03-10 PROCEDURE — 74011636637 HC RX REV CODE- 636/637: Performed by: INTERNAL MEDICINE

## 2021-03-10 PROCEDURE — 86923 COMPATIBILITY TEST ELECTRIC: CPT

## 2021-03-10 PROCEDURE — 74011000250 HC RX REV CODE- 250: Performed by: EMERGENCY MEDICINE

## 2021-03-10 PROCEDURE — 0DJ08ZZ INSPECTION OF UPPER INTESTINAL TRACT, VIA NATURAL OR ARTIFICIAL OPENING ENDOSCOPIC: ICD-10-PCS | Performed by: INTERNAL MEDICINE

## 2021-03-10 PROCEDURE — 51798 US URINE CAPACITY MEASURE: CPT

## 2021-03-10 PROCEDURE — 85610 PROTHROMBIN TIME: CPT

## 2021-03-10 PROCEDURE — 82962 GLUCOSE BLOOD TEST: CPT

## 2021-03-10 PROCEDURE — 80053 COMPREHEN METABOLIC PANEL: CPT

## 2021-03-10 PROCEDURE — C9113 INJ PANTOPRAZOLE SODIUM, VIA: HCPCS | Performed by: EMERGENCY MEDICINE

## 2021-03-10 PROCEDURE — 74011000250 HC RX REV CODE- 250: Performed by: NURSE PRACTITIONER

## 2021-03-10 PROCEDURE — 76040000026: Performed by: INTERNAL MEDICINE

## 2021-03-10 PROCEDURE — 2709999900 HC NON-CHARGEABLE SUPPLY: Performed by: INTERNAL MEDICINE

## 2021-03-10 PROCEDURE — 86900 BLOOD TYPING SEROLOGIC ABO: CPT

## 2021-03-10 PROCEDURE — C9113 INJ PANTOPRAZOLE SODIUM, VIA: HCPCS | Performed by: NURSE PRACTITIONER

## 2021-03-10 PROCEDURE — 99284 EMERGENCY DEPT VISIT MOD MDM: CPT

## 2021-03-10 PROCEDURE — 36415 COLL VENOUS BLD VENIPUNCTURE: CPT

## 2021-03-10 PROCEDURE — 77030040361 HC SLV COMPR DVT MDII -B: Performed by: INTERNAL MEDICINE

## 2021-03-10 PROCEDURE — 74011250636 HC RX REV CODE- 250/636: Performed by: EMERGENCY MEDICINE

## 2021-03-10 PROCEDURE — 74011250636 HC RX REV CODE- 250/636: Performed by: NURSE PRACTITIONER

## 2021-03-10 PROCEDURE — 77030037088 HC TUBE ENDOTRACH ORAL NSL COVD-A: Performed by: STUDENT IN AN ORGANIZED HEALTH CARE EDUCATION/TRAINING PROGRAM

## 2021-03-10 RX ORDER — ONDANSETRON 2 MG/ML
INJECTION INTRAMUSCULAR; INTRAVENOUS
Status: DISCONTINUED
Start: 2021-03-10 | End: 2021-03-10 | Stop reason: WASHOUT

## 2021-03-10 RX ORDER — INSULIN LISPRO 100 [IU]/ML
4 INJECTION, SOLUTION INTRAVENOUS; SUBCUTANEOUS ONCE
Status: DISCONTINUED | OUTPATIENT
Start: 2021-03-10 | End: 2021-03-10

## 2021-03-10 RX ORDER — LIDOCAINE HYDROCHLORIDE 20 MG/ML
INJECTION, SOLUTION EPIDURAL; INFILTRATION; INTRACAUDAL; PERINEURAL AS NEEDED
Status: DISCONTINUED | OUTPATIENT
Start: 2021-03-10 | End: 2021-03-10 | Stop reason: HOSPADM

## 2021-03-10 RX ORDER — ONDANSETRON 2 MG/ML
8 INJECTION INTRAMUSCULAR; INTRAVENOUS
Status: COMPLETED | OUTPATIENT
Start: 2021-03-10 | End: 2021-03-10

## 2021-03-10 RX ORDER — PROPOFOL 10 MG/ML
INJECTION, EMULSION INTRAVENOUS AS NEEDED
Status: DISCONTINUED | OUTPATIENT
Start: 2021-03-10 | End: 2021-03-10 | Stop reason: HOSPADM

## 2021-03-10 RX ORDER — ACETAMINOPHEN 650 MG/1
650 SUPPOSITORY RECTAL
Status: DISCONTINUED | OUTPATIENT
Start: 2021-03-10 | End: 2021-03-13 | Stop reason: HOSPADM

## 2021-03-10 RX ORDER — POLYETHYLENE GLYCOL 3350 17 G/17G
17 POWDER, FOR SOLUTION ORAL DAILY PRN
Status: DISCONTINUED | OUTPATIENT
Start: 2021-03-10 | End: 2021-03-13 | Stop reason: HOSPADM

## 2021-03-10 RX ORDER — SODIUM CHLORIDE, SODIUM LACTATE, POTASSIUM CHLORIDE, CALCIUM CHLORIDE 600; 310; 30; 20 MG/100ML; MG/100ML; MG/100ML; MG/100ML
100 INJECTION, SOLUTION INTRAVENOUS CONTINUOUS
Status: DISCONTINUED | OUTPATIENT
Start: 2021-03-10 | End: 2021-03-10 | Stop reason: HOSPADM

## 2021-03-10 RX ORDER — FLUTICASONE PROPIONATE 50 MCG
2 SPRAY, SUSPENSION (ML) NASAL DAILY
Status: DISCONTINUED | OUTPATIENT
Start: 2021-03-11 | End: 2021-03-13 | Stop reason: HOSPADM

## 2021-03-10 RX ORDER — INSULIN LISPRO 100 [IU]/ML
INJECTION, SOLUTION INTRAVENOUS; SUBCUTANEOUS EVERY 6 HOURS
Status: DISCONTINUED | OUTPATIENT
Start: 2021-03-11 | End: 2021-03-11

## 2021-03-10 RX ORDER — INSULIN GLARGINE 100 [IU]/ML
10 INJECTION, SOLUTION SUBCUTANEOUS
Status: DISCONTINUED | OUTPATIENT
Start: 2021-03-10 | End: 2021-03-13 | Stop reason: HOSPADM

## 2021-03-10 RX ORDER — PROMETHAZINE HYDROCHLORIDE 25 MG/1
12.5 TABLET ORAL
Status: DISCONTINUED | OUTPATIENT
Start: 2021-03-10 | End: 2021-03-13 | Stop reason: HOSPADM

## 2021-03-10 RX ORDER — ONDANSETRON 2 MG/ML
INJECTION INTRAMUSCULAR; INTRAVENOUS AS NEEDED
Status: DISCONTINUED | OUTPATIENT
Start: 2021-03-10 | End: 2021-03-10 | Stop reason: HOSPADM

## 2021-03-10 RX ORDER — SODIUM CHLORIDE 0.9 % (FLUSH) 0.9 %
5-40 SYRINGE (ML) INJECTION EVERY 8 HOURS
Status: DISCONTINUED | OUTPATIENT
Start: 2021-03-10 | End: 2021-03-13 | Stop reason: HOSPADM

## 2021-03-10 RX ORDER — ROCURONIUM BROMIDE 10 MG/ML
INJECTION, SOLUTION INTRAVENOUS AS NEEDED
Status: DISCONTINUED | OUTPATIENT
Start: 2021-03-10 | End: 2021-03-10 | Stop reason: HOSPADM

## 2021-03-10 RX ORDER — FAMOTIDINE 20 MG/1
20 TABLET, FILM COATED ORAL AS NEEDED
Status: DISCONTINUED | OUTPATIENT
Start: 2021-03-10 | End: 2021-03-10 | Stop reason: HOSPADM

## 2021-03-10 RX ORDER — ACETAMINOPHEN 325 MG/1
650 TABLET ORAL
Status: DISCONTINUED | OUTPATIENT
Start: 2021-03-10 | End: 2021-03-13 | Stop reason: HOSPADM

## 2021-03-10 RX ORDER — SODIUM CHLORIDE 9 MG/ML
50 INJECTION, SOLUTION INTRAVENOUS CONTINUOUS
Status: DISCONTINUED | OUTPATIENT
Start: 2021-03-10 | End: 2021-03-13 | Stop reason: HOSPADM

## 2021-03-10 RX ORDER — ONDANSETRON 2 MG/ML
4 INJECTION INTRAMUSCULAR; INTRAVENOUS
Status: DISCONTINUED | OUTPATIENT
Start: 2021-03-10 | End: 2021-03-13 | Stop reason: HOSPADM

## 2021-03-10 RX ORDER — METOCLOPRAMIDE HYDROCHLORIDE 5 MG/ML
5 INJECTION INTRAMUSCULAR; INTRAVENOUS ONCE
Status: COMPLETED | OUTPATIENT
Start: 2021-03-11 | End: 2021-03-11

## 2021-03-10 RX ORDER — ATORVASTATIN CALCIUM 10 MG/1
20 TABLET, FILM COATED ORAL
Status: DISCONTINUED | OUTPATIENT
Start: 2021-03-10 | End: 2021-03-13 | Stop reason: HOSPADM

## 2021-03-10 RX ORDER — METOCLOPRAMIDE HYDROCHLORIDE 5 MG/ML
5 INJECTION INTRAMUSCULAR; INTRAVENOUS ONCE
Status: COMPLETED | OUTPATIENT
Start: 2021-03-10 | End: 2021-03-10

## 2021-03-10 RX ORDER — MELATONIN
1000 DAILY
Status: DISCONTINUED | OUTPATIENT
Start: 2021-03-11 | End: 2021-03-13 | Stop reason: HOSPADM

## 2021-03-10 RX ORDER — SODIUM CHLORIDE 0.9 % (FLUSH) 0.9 %
5-40 SYRINGE (ML) INJECTION AS NEEDED
Status: DISCONTINUED | OUTPATIENT
Start: 2021-03-10 | End: 2021-03-13 | Stop reason: HOSPADM

## 2021-03-10 RX ORDER — SUCCINYLCHOLINE CHLORIDE 20 MG/ML
INJECTION INTRAMUSCULAR; INTRAVENOUS AS NEEDED
Status: DISCONTINUED | OUTPATIENT
Start: 2021-03-10 | End: 2021-03-10 | Stop reason: HOSPADM

## 2021-03-10 RX ORDER — HYOSCYAMINE SULFATE 0.12 MG/1
0.12 TABLET SUBLINGUAL
Status: DISCONTINUED | OUTPATIENT
Start: 2021-03-10 | End: 2021-03-13 | Stop reason: HOSPADM

## 2021-03-10 RX ORDER — TAMSULOSIN HYDROCHLORIDE 0.4 MG/1
0.4 CAPSULE ORAL
Status: DISCONTINUED | OUTPATIENT
Start: 2021-03-10 | End: 2021-03-13 | Stop reason: HOSPADM

## 2021-03-10 RX ORDER — SODIUM CHLORIDE, SODIUM LACTATE, POTASSIUM CHLORIDE, CALCIUM CHLORIDE 600; 310; 30; 20 MG/100ML; MG/100ML; MG/100ML; MG/100ML
INJECTION, SOLUTION INTRAVENOUS
Status: DISCONTINUED | OUTPATIENT
Start: 2021-03-10 | End: 2021-03-10 | Stop reason: HOSPADM

## 2021-03-10 RX ADMIN — SODIUM CHLORIDE 40 MG: 9 INJECTION, SOLUTION INTRAMUSCULAR; INTRAVENOUS; SUBCUTANEOUS at 10:28

## 2021-03-10 RX ADMIN — ONDANSETRON 8 MG: 2 INJECTION INTRAMUSCULAR; INTRAVENOUS at 10:29

## 2021-03-10 RX ADMIN — PROPOFOL 120 MG: 10 INJECTION, EMULSION INTRAVENOUS at 14:53

## 2021-03-10 RX ADMIN — INSULIN HUMAN 4 UNITS: 100 INJECTION, SOLUTION PARENTERAL at 14:09

## 2021-03-10 RX ADMIN — PANTOPRAZOLE SODIUM 40 MG: 40 INJECTION, POWDER, FOR SOLUTION INTRAVENOUS at 21:33

## 2021-03-10 RX ADMIN — ONDANSETRON 4 MG: 2 INJECTION INTRAMUSCULAR; INTRAVENOUS at 17:46

## 2021-03-10 RX ADMIN — Medication 10 ML: at 21:34

## 2021-03-10 RX ADMIN — INSULIN GLARGINE 10 UNITS: 100 INJECTION, SOLUTION SUBCUTANEOUS at 21:33

## 2021-03-10 RX ADMIN — SODIUM CHLORIDE, SODIUM LACTATE, POTASSIUM CHLORIDE, AND CALCIUM CHLORIDE: 600; 310; 30; 20 INJECTION, SOLUTION INTRAVENOUS at 15:32

## 2021-03-10 RX ADMIN — ONDANSETRON 8 MG: 2 INJECTION INTRAMUSCULAR; INTRAVENOUS at 12:12

## 2021-03-10 RX ADMIN — ROCURONIUM BROMIDE 5 MG: 10 INJECTION, SOLUTION INTRAVENOUS at 14:54

## 2021-03-10 RX ADMIN — LIDOCAINE HYDROCHLORIDE 60 MG: 20 INJECTION, SOLUTION EPIDURAL; INFILTRATION; INTRACAUDAL; PERINEURAL at 14:54

## 2021-03-10 RX ADMIN — SODIUM CHLORIDE 100 ML/HR: 900 INJECTION, SOLUTION INTRAVENOUS at 17:43

## 2021-03-10 RX ADMIN — PHENYLEPHRINE HYDROCHLORIDE 150 MCG: 10 INJECTION INTRAVENOUS at 15:02

## 2021-03-10 RX ADMIN — SODIUM CHLORIDE, SODIUM LACTATE, POTASSIUM CHLORIDE, AND CALCIUM CHLORIDE: 600; 310; 30; 20 INJECTION, SOLUTION INTRAVENOUS at 14:45

## 2021-03-10 RX ADMIN — SODIUM CHLORIDE 1000 ML: 900 INJECTION, SOLUTION INTRAVENOUS at 10:28

## 2021-03-10 RX ADMIN — LIDOCAINE HYDROCHLORIDE 40 MG: 20 INJECTION, SOLUTION EPIDURAL; INFILTRATION; INTRACAUDAL; PERINEURAL at 14:53

## 2021-03-10 RX ADMIN — METOCLOPRAMIDE HYDROCHLORIDE 5 MG: 5 INJECTION INTRAMUSCULAR; INTRAVENOUS at 18:03

## 2021-03-10 RX ADMIN — PHENYLEPHRINE HYDROCHLORIDE 200 MCG: 10 INJECTION INTRAVENOUS at 14:59

## 2021-03-10 RX ADMIN — Medication 10 ML: at 17:48

## 2021-03-10 RX ADMIN — PHENYLEPHRINE HYDROCHLORIDE 150 MCG: 10 INJECTION INTRAVENOUS at 14:56

## 2021-03-10 RX ADMIN — SUCCINYLCHOLINE CHLORIDE 120 MG: 20 INJECTION, SOLUTION INTRAMUSCULAR; INTRAVENOUS at 14:53

## 2021-03-10 RX ADMIN — ONDANSETRON 4 MG: 2 INJECTION INTRAMUSCULAR; INTRAVENOUS at 15:01

## 2021-03-10 RX ADMIN — ONDANSETRON 4 MG: 2 INJECTION INTRAMUSCULAR; INTRAVENOUS at 15:09

## 2021-03-10 NOTE — PROCEDURES
[facility]  ESOPHAGOGASTRODUODENOSCOPY        DATE of PROCEDURE: 3/10/2021    PT NAME: Magda Zuniga  [id number]    PHYSICIAN:  Lucho Mistry MD    MEDICATION:  MAC    INSTRUMENT: GIFQ    PROCEDURE:  EGD diagnostic    INDICATIONS: Coffee Ground Emesis and Melena, Non-transfused Acute Blood Loss Anemia    FINDINGS:  OROPHARYNX: Cords and pyriform recesses normal.  ESOPHAGUS: There is coffee grounds noted in the esophagus. There is LA Class B esophagitis in the distal esophagus which is not actively bleeding. STOMACH: As soon as you enter the stomach, there is large amount of old blood and large blood clots which limits visualization of the stomach. I did not see any fresh blood. The amount of blood clots in the antrum prohibited us from entering the duodenum. The procedure was then terminated with plans to repeat EGD tomorrow. ASSESSMENT:  1. Old Blood/ Large Blood Clots in Stomach limiting visualization  2. LA Class B Esophagitis    PLAN:  1. After the procedure, patient was throwing up the blood from his stomach. Anesthesia is evaluating whether or not it will be safe to extubate the patient. 2. Will need to monitor for recurrent bleeding overnight. Monitor H/H and transfuse as needed. 3. Will try Reglan to see if we can emptying the clot burden from the stomach  4. We can try repeating EGD tomorrow. 5. If patient has active bleeding, please do not hesitate to call us. If actively bleeding, we may have to consider CTA with IR intervention.     6. Continue PPI IV BID for now and avoid NSAIDs     Lucho Mistry MD  [facility]

## 2021-03-10 NOTE — ANESTHESIA POSTPROCEDURE EVALUATION
Procedure(s):  ESOPHAGOGASTRODUODENOSCOPY (EGD) ROOM 2002. general    Anesthesia Post Evaluation      Multimodal analgesia: multimodal analgesia used between 6 hours prior to anesthesia start to PACU discharge  Patient location during evaluation: bedside  Patient participation: complete - patient participated  Level of consciousness: awake and alert  Pain management: adequate  Airway patency: patent  Anesthetic complications: no  Cardiovascular status: hemodynamically stable  Respiratory status: spontaneous ventilation  Hydration status: euvolemic  Comments: Patient stable and may discharge at this time. Post anesthesia nausea and vomiting:  none and controlled (patient has blood in his stomach.)  Final Post Anesthesia Temperature Assessment:  Normothermia (36.0-37.5 degrees C)      INITIAL Post-op Vital signs:   Vitals Value Taken Time   /70 03/10/21 1548   Temp 36.3 °C (97.3 °F) 03/10/21 1535   Pulse 83 03/10/21 1550   Resp 16 03/10/21 1548   SpO2 100 % 03/10/21 1550   Vitals shown include unvalidated device data.

## 2021-03-10 NOTE — H&P (VIEW-ONLY)
[facility] Consult Note Primary GI Physician: Dr. Terrie Blount Referring Provider:  Dr. Kinza Lakhani Consult Date:  3/10/2021 Admit Date:  3/10/2021 Chief Complaint: Coffee ground emesis Subjective:  
 
History of Present Illness:  Patient is a 80 y.o. male with PMH of including but not limited to, recent obstructive calculus with stent placement on 3/7, new liver lesion with current oncology evaluation and normal AFP with pending MRI, chronic pancreatitis followed by Dr. Minal Wallis on Creon therapy, DM, TARIQ, claustrophobia who is seen in consultation at the request of Dr. Kinza Lakhani for coffee ground emesis. Mr. Caleen Schilder developed sudden onset vomiting last night with coffee ground emesis multiple times. He had one episode of melena following this. He has had no vomiting since his arrival to the ED. He has a history of GERD and took omeprazole for years. He stopped this about 6 years ago and has been on OTC antacids and baking soda with relief. He denies any abdominal pain, dysphagia, NSAIDS, tobacco or ETOH. He denies any anticoagulants. He last had anything to eat or drink at 6pm last night. On evaluation in the ED, he was found to have a Hgb of 10 (down from 12.7 on 3/6). BUN is elevated at 62, creat 2.60. He saw Dr. Minal Wallis on 2/26/2021 for diarrhea. He was instructed to take his Creon 2 pills prior to meals and 1 prior to snacks and his diarrhea resolved. He has a known history of chronic pancreatitis and chronic pancreatic insufficiency. He had an EUS on 7/12/2013 by Dr. Minal Wallis that confirmed chronic pancreatic changes and a single pancreatic stone. He underwent ERCP by Dr. Adis Harden on 12/21/2015 that revealed stenotic biliary stricture and gastroduodenitis. Colonoscopy on 10/13/2017 revealed internal hemorrhoids, diverticular disease and tubular adenomas. This was discussed at his last ov and it was decided to do no further screenings due to his age and current health. EXAM: CT of the abdomen and pelvis without contrast on 3/2/2021 Indication: Right flank pain Comparison: CT abdomen and pelvis, 12/4/2016 
  
Multiple axial images were obtained through the abdomen and pelvis without IV 
contrast.  Radiation dose reduction techniques were used for this study: All CT 
scans performed at this facility use one or all of the following: Automated 
exposure control, adjustment of the mA and/or kVp according to patient's size, 
iterative reconstruction. 
  
FINDINGS: 
LOWER THORAX: There is subsegmental atelectasis/scarring involving the lung 
bases with elevation of the left hemidiaphragm. 
  
LIVER: There is a new heterogeneous 6.4 x 3.7 x 5.3 cm mass along the inferior 
tip of the right hepatic lobe. No other definitive liver lesions are seen though 
evaluation is limited without IV contrast. 
  
BILIARY: The gallbladder is normal. No biliary duct dilation. 
  
SPLEEN: No splenomegaly. 
  
PANCREAS: Sequela of chronic pancreatitis with numerous pancreatic parenchymal 
calcifications several calcifications in a linear distribution in the pancreatic 
head likely represent pancreatic duct calculi. No acute inflammatory changes. 
  
ADRENALS: No adrenal nodule or adrenal hypertrophy. 
  
URINARY SYSTEM: There are multiple simple cysts bilaterally. There is an 
obstructing 4 mm calculus in the proximal right ureter resulting in mild right 
hydroureteronephrosis with right perinephric inflammatory stranding. There is an 
additional nonobstructing right interpolar calculus measuring 3 mm. No left 
renal calculi. There is mild diffuse urinary bladder wall thickening without 
significant surrounding inflammatory changes. 
  
BOWEL: Stomach, small bowel, and large bowel are normal in course and caliber. No evidence of obstruction. The appendix is surgically absent.  Colonic 
diverticulosis without evidence of acute diverticulitis. 
  
VASCULAR: The abdominal aorta and iliac arterial system are nonaneurysmal with 
advanced atherosclerosis. 
  
NODES: No lymphadenopathy. 
  
FLUID:  No free fluid. 
  
REPRODUCTIVE: Prostatomegaly. 
  
BONES/SOFT TISSUE: Small bilateral fat-containing inguinal hernias. Prior lower 
abdominal wall hernia repair. No acute or aggressive osseous abnormality. 
  
  
IMPRESSION 
  
1. Obstructing 4 mm calculus in the proximal right ureter resulting in mild 
right hydroureteronephrosis with perinephric inflammatory stranding. 2. Additional nonobstructing 3 mm right renal calculus. 3. New heterogeneous liver mass measuring up to 6.4 cm. Differential 
considerations include metastatic and primary liver neoplasms. Evaluation of the 
solid organs is limited without IV contrast. Consider follow-up examination with 
contrast. 
4. Chronic pancreatitis. 5. Prostatomegaly with urinary bladder wall thickening, possibly related to 
chronic outlet obstruction. 
  
  
 
 
 
PMH: 
Past Medical History:  
Diagnosis Date  Abnormal CT of liver 03/02/2021 New heterogeneous liver mass measuring up to 6.4 cm. Further evaluation required  AR (allergic rhinitis) 8/3/2016  Arthritis  Benign paroxysmal positional vertigo  Bilateral impacted cerumen  BPH (benign prostatic hyperplasia)  CAD (coronary artery disease) Followed by Dr Luis Ross at [facility]  Chronic otitis media  Claustrophobia  Diverticulitis 8/3/2016  DNS (deviated nasal septum)  ETD (eustachian tube dysfunction)  H/O heart artery stent S/P RCA stent by Dr. Echols Courser in 2003  Heart disease 8/3/2016  High frequency hearing loss  History of bilateral inguinal hernia repair  History of gastroesophageal reflux (GERD)  History of pancreatitis  History of squamous cell carcinoma   
 left forearm near wrist  
 Hypercholesterolemia  Hyperlipidemia  Hypertrophy of both inferior nasal turbinates  Kidney stone  MHL (mixed hearing loss)  NO (nasal obstruction)  Otitis media, nonsuppurative  SNHL (sensorineural hearing loss) 8/3/2016  Type 2 diabetes mellitus (HCC)   
 insulin reliant/AVG FBS: 100-120/s.s of hypoglycemia at 70/last A1c:7.5  
 Unspecified sleep apnea   
 no cpap;   
 Vertigo PSH: 
Past Surgical History:  
Procedure Laterality Date  HX APPENDECTOMY  HX CATARACT REMOVAL Bilateral   
 HX COLONOSCOPY    
 HX HERNIA REPAIR Bilateral   
 Dr Carlos Peters 30 years ago.  HX MASTOIDECTOMY  1996  
 right modified canal wall down  HX OTHER SURGICAL    
 tube right ear  HX OTHER SURGICAL    
 skin lesion; local excision: SCC  
 HX OTHER SURGICAL  1/14/03 Dupuytrens contracture release  HX TURP    
 HX VITRECTOMY  DC CARDIAC SURG PROCEDURE UNLIST    
 stent Allergies: Allergies Allergen Reactions  Iohexol Anaphylaxis  Other Medication Anaphylaxis Omnipaque Rediflo 240 (contrast)  Atorvastatin Myalgia Currently tolerating  Cipro [Ciprofloxacin] Hives  Codeine Unknown (comments) Makes patient hyperactive  Iodinated Contrast Media Anaphylaxis  Penicillins Rash  Valdecoxib Hives and Rash Home Medications: 
Prior to Admission medications Medication Sig Start Date End Date Taking? Authorizing Provider  
oxyCODONE-acetaminophen (Percocet) 5-325 mg per tablet Take 1 Tab by mouth every four (4) hours as needed for Pain for up to 7 days. Max Daily Amount: 6 Tabs. 3/7/21 3/14/21  Jacinto Clarke MD  
hyoscyamine SL (Levsin/SL) 0.125 mg SL tablet 1 Tab by SubLINGual route every four (4) hours as needed for Cramping (bladder spasms). 3/7/21   Jacinto Clarke MD  
trimethoprim-sulfamethoxazole (Bactrim DS) 160-800 mg per tablet Take 1 Tab by mouth two (2) times a day for 7 days. 3/7/21 3/14/21  Jacinto Clarke MD  
insulin glargine (Lantus U-100 Insulin) 100 unit/mL injection 25 Units by SubCUTAneous route two (2) times a day.     Provider, Historical  
atorvastatin (Lipitor) 20 mg tablet Take 20 mg by mouth nightly. Provider, Historical  
ZINC OXIDE PO Take  by mouth daily. Provider, Historical  
ondansetron (ZOFRAN ODT) 4 mg disintegrating tablet Take 1 Tab by mouth every eight (8) hours as needed for Nausea. 3/2/21   Victoria Chow MD  
fluticasone propionate (FLONASE) 50 mcg/actuation nasal spray Use 2 sprays in each nostril daily 12/29/20   Marlyse Cooks, MD  
insulin aspart (NOVOLOG FLEXPEN U-100 INSULIN SC) 15 Units by SubCUTAneous route Before breakfast, lunch, and dinner. Provider, Historical  
tamsulosin (FLOMAX) 0.4 mg capsule TAKE 1 CAPSULE BY MOUTH EVERYDAY AT BEDTIME 7/14/20   Provider, Historical  
cholecalciferol (VITAMIN D3) 1,000 unit cap Take  by mouth daily. Provider, Historical  
amylase-lipase-protease (CREON) 12,000-38,000 -60,000 unit capsule Take  by mouth three (3) times daily (with meals). Other, MD [place]  
FISH OIL 1,000 mg Cap Take 1 Cap by mouth daily. Am. 
1 tsp liquid- concentrated Last dose 5/13/14    Provider, Historical  
aspirin 81 mg Tab Take 81 mg by mouth nightly. Provider, Historical  
 
 
Hospital Medications: No current facility-administered medications for this encounter. Current Outpatient Medications Medication Sig  
 oxyCODONE-acetaminophen (Percocet) 5-325 mg per tablet Take 1 Tab by mouth every four (4) hours as needed for Pain for up to 7 days. Max Daily Amount: 6 Tabs.  hyoscyamine SL (Levsin/SL) 0.125 mg SL tablet 1 Tab by SubLINGual route every four (4) hours as needed for Cramping (bladder spasms).  trimethoprim-sulfamethoxazole (Bactrim DS) 160-800 mg per tablet Take 1 Tab by mouth two (2) times a day for 7 days.  insulin glargine (Lantus U-100 Insulin) 100 unit/mL injection 25 Units by SubCUTAneous route two (2) times a day.  atorvastatin (Lipitor) 20 mg tablet Take 20 mg by mouth nightly.  ZINC OXIDE PO Take  by mouth daily.   
 ondansetron (ZOFRAN ODT) 4 mg disintegrating tablet Take 1 Tab by mouth every eight (8) hours as needed for Nausea.  fluticasone propionate (FLONASE) 50 mcg/actuation nasal spray Use 2 sprays in each nostril daily  insulin aspart (NOVOLOG FLEXPEN U-100 INSULIN SC) 15 Units by SubCUTAneous route Before breakfast, lunch, and dinner.  tamsulosin (FLOMAX) 0.4 mg capsule TAKE 1 CAPSULE BY MOUTH EVERYDAY AT BEDTIME  cholecalciferol (VITAMIN D3) 1,000 unit cap Take  by mouth daily.  amylase-lipase-protease (CREON) 12,000-38,000 -60,000 unit capsule Take  by mouth three (3) times daily (with meals).  FISH OIL 1,000 mg Cap Take 1 Cap by mouth daily. Am. 
1 tsp liquid- concentrated Last dose 5/13/14  aspirin 81 mg Tab Take 81 mg by mouth nightly. Social History: 
Social History Tobacco Use  Smoking status: Never Smoker  Smokeless tobacco: Never Used Substance Use Topics  Alcohol use: No  
 
 
Family History: 
Family History Problem Relation Age of Onset  Cancer Maternal Grandmother   
     colon  Heart Disease Maternal Grandfather Review of Systems: A detailed 10 system ROS is obtained, with pertinent positives as listed above. All others are negative. Diet:  NPO Objective:  
 
Physical Exam: 
Vitals: 
Visit Vitals BP (!) 115/59 (BP 1 Location: Right upper arm, BP Patient Position: At rest) Pulse 84 Temp 98.1 °F (36.7 °C) Resp 19 Ht 5' 9\" (1.753 m) Wt 83.9 kg (185 lb) SpO2 97% BMI 27.32 kg/m² Gen:  Pt is alert, cooperative, no acute distress DAUGHTER AT BEDSIDE Skin:  Extremities and face reveal no rashes. HEENT: Sclerae anicteric. Extra-occular muscles are intact. No oral ulcers. No abnormal pigmentation of the lips. The neck is supple. HARD OF HEARING Cardiovascular: Regular rate and rhythm. No murmurs, gallops, or rubs. Respiratory:  Comfortable breathing with no accessory muscle use. Clear breath sounds anteriorly with no wheezes, rales, or rhonchi.  
GI:  Abdomen nondistended, soft, and nontender. Normal active bowel sounds. No enlargement of the liver or spleen. No masses palpable. Musculoskeletal:  No pitting edema of the lower legs. Neurological:  Gross memory appears intact. Patient is alert and oriented. Psychiatric:  Mood appears appropriate with judgement intact. Lymphatic:  No cervical or supraclavicular adenopathy. Laboratory:   
Recent Labs  
  03/10/21 
0957 WBC 13.0* HGB 10.0* HCT 31.5*  MCV 88.0   
K 4.5  
 CO2 23 BUN 62* CREA 2.60* CA 8.5 * * * TBILI 0.3 ALB 2.6* TP 6.3 PTP 14.3 INR 1.1 Assessment:  
 
Active Problems: 
  GI bleed (3/10/2021) 80 y.o. male with PMH of including but not limited to, recent obstructive calculus with stent placement on 3/7, new liver lesion with current oncology evaluation and normal AFP with pending MRI, chronic pancreatitis followed by Dr. Minal Wallis on Creon therapy, DM, TARIQ, claustrophobia who is seen in consultation at the request of Dr. Kinza Lakhani for coffee ground emesis. Mr. Caleen Schilder developed sudden onset vomiting last night with coffee ground emesis multiple times. He had one episode of melena following this. He has had no vomiting since his arrival to the ED. On evaluation in the ED, he was found to have a Hgb of 10 (down from 12.7 on 3/6). BUN is elevated at 62, creat 2.60. This may represent PUD, malignancy, AVM or Dieulafoy lesion. Plan:  
 
-EGD today with Dr. Peri Shane. Risks and benefits discussed with patient to include risk of infection, bleeding, perforation, anesthesia, and possible mortality. He verbalized understanding and wishes to proceed with EGD 
-Pantoprazole 40mg IV bid 
-Keep NPO until procedure 
-Serial H&H and transfuse as needed 
-Continue follow up with Dr. Janessa Nieves, oncology regarding new liver mass.  Scheduled for outpatient MRI 
-we will obtain Hepatitis panel (since not previously obtained) to make sure he doesn't have chronic hepatitis that could have resulted in hepatocellular carcinoma 
-He has a current elevation of this LFTS. This may be secondary to new liver mass or possible drug induced injury with recent antibiotics after renal stent 
-Daily LFTS  
-PT INR Sania Most. Endy Robles [facility] Patient is seen and examined in collaboration with Dr. Chito Moreno. Assessment and plan as per Dr. Chito Moreno.

## 2021-03-10 NOTE — ANESTHESIA PREPROCEDURE EVALUATION
Anesthetic History   No history of anesthetic complications            Review of Systems / Medical History  Patient summary reviewed, nursing notes reviewed and pertinent labs reviewed    Pulmonary  Within defined limits                 Neuro/Psych   Within defined limits           Cardiovascular              CAD (stent 2008), CABG/stent, cardiac stents and hyperlipidemia    Exercise tolerance: >4 METS     GI/Hepatic/Renal     GERD (chronic pancreatitis)    Renal disease: CRI      Comments: Gi bleed, nausea and vomiting, will require ga.  Endo/Other    Diabetes: well controlled, type 2, using insulin    Arthritis     Other Findings              Physical Exam    Airway  Mallampati: II  TM Distance: > 6 cm  Neck ROM: normal range of motion   Mouth opening: Normal     Cardiovascular  Regular rate and rhythm,  S1 and S2 normal,  no murmur, click, rub, or gallop  Rhythm: regular  Rate: normal         Dental  No notable dental hx       Pulmonary  Breath sounds clear to auscultation               Abdominal         Other Findings            Anesthetic Plan    ASA: 3, emergent  Anesthesia type: general          Induction: Intravenous and RSI  Anesthetic plan and risks discussed with: Patient

## 2021-03-10 NOTE — ADDENDUM NOTE
Addendum  created 03/10/21 1611 by Nicola Harrington, CRNA    Flowsheet accepted, Intraprocedure Event edited, Intraprocedure Flowsheets edited, Intraprocedure LDAs edited, Intraprocedure Meds edited, LDA properties accepted

## 2021-03-10 NOTE — PROGRESS NOTES
After EGD procedure completed, Pt began vomiting coffee ground colored emesis. Dr Chris Starr contacted to evaluate patient for extubation. NG tube inserted by CRNA with minimal output. 1522: Dr Chris Starr at bedside- patient extubated without vomiting. Patient transported to PACU.

## 2021-03-10 NOTE — ED PROVIDER NOTES
[address] EMERGENCY DEPARTMENT     Carlos Guevara is a 80 y.o. male seen on 3/10/2021 in the [facility] EMERGENCY DEPT in room ER11/11. Chief Complaint   Patient presents with    Vomiting     HPI: 80-year-old male presented emergency department with complaints of vomiting blood that began last evening. Patient was recently in the hospital last week for acute renal failure and obstructing kidney stone. Patient had a ureteral stent placed 3 days ago while as an inpatient. Patient states that he was doing better and then started with significant vomiting and nausea last evening. Patient states that it is dark and red. He is also noted that his stools are slightly darker. He denies any abdominal pain at this time. He states that he is been taking his medicines as prescribed. He is only on aspirin as a blood thinner. Patient has not been taking extra NSAIDs with his recent kidney stone but is on baclofen daily. He has never had a GI bleed before that he is aware of. But he does have a history of GERD. Patient complains of generalized fatigue and malaise. He denies any chest pain, shortness of breath or fevers. Historian: Patient    REVIEW OF SYSTEMS     Review of Systems   Constitutional: Positive for fatigue. HENT: Negative. Respiratory: Negative. Cardiovascular: Negative. Gastrointestinal: Positive for blood in stool, nausea and vomiting. Genitourinary: Negative. Musculoskeletal: Negative. Skin: Negative. Neurological: Negative. Hematological: Negative. Psychiatric/Behavioral: Negative. All other systems reviewed and are negative. PAST MEDICAL HISTORY     Past Medical History:   Diagnosis Date    Abnormal CT of liver 03/02/2021    New heterogeneous liver mass measuring up to 6.4 cm.  Further evaluation required    AR (allergic rhinitis) 8/3/2016    Arthritis     Benign paroxysmal positional vertigo     Bilateral impacted cerumen     BPH (benign prostatic hyperplasia)     CAD (coronary artery disease)     Followed by Dr Kanika Siddiqui at [facility] Chronic otitis media    [address] Diverticulitis 8/3/2016    DNS (deviated nasal septum)     ETD (eustachian tube dysfunction)     H/O heart artery stent     S/P RCA stent by Dr. Telly Rivas in 2003    Heart disease 8/3/2016    High frequency hearing loss     History of bilateral inguinal hernia repair     History of gastroesophageal reflux (GERD)     History of pancreatitis     History of squamous cell carcinoma     left forearm near wrist    Hypercholesterolemia     Hyperlipidemia     Hypertrophy of both inferior nasal turbinates     Kidney stone     MHL (mixed hearing loss)     NO (nasal obstruction)     Otitis media, nonsuppurative     SNHL (sensorineural hearing loss) 8/3/2016    Type 2 diabetes mellitus (HCC)     insulin reliant/AVG FBS: 100-120/s.s of hypoglycemia at 70/last A1c:7.5    Unspecified sleep apnea     no cpap;     Vertigo      Past Surgical History:   Procedure Laterality Date    HX APPENDECTOMY      HX CATARACT REMOVAL Bilateral     HX COLONOSCOPY      HX HERNIA REPAIR Bilateral     Dr Shirley Monday 30 years ago.  HX MASTOIDECTOMY  1996    right modified canal wall down    HX OTHER SURGICAL      tube right ear    HX OTHER SURGICAL      skin lesion; local excision: SCC    HX OTHER SURGICAL  1/14/03    Dupuytrens contracture release    HX TURP      HX VITRECTOMY      UT CARDIAC SURG PROCEDURE UNLIST      stent     Social History     Socioeconomic History    Marital status:      Spouse name: Not on file    Number of children: Not on file    Years of education: Not on file    Highest education level: Not on file   Tobacco Use    Smoking status: Never Smoker    Smokeless tobacco: Never Used   Substance and Sexual Activity    Alcohol use: No    Drug use: No     Prior to Admission Medications   Prescriptions Last Dose Informant Patient Reported? Taking? FISH OIL 1,000 mg Cap   Yes No   Sig: Take 1 Cap by mouth daily. Am.  1 tsp liquid- concentrated  Last dose 14   ZINC OXIDE PO   Yes No   Sig: Take  by mouth daily. amylase-lipase-protease (CREON) 12,000-38,000 -60,000 unit capsule   Yes No   Sig: Take  by mouth three (3) times daily (with meals). aspirin 81 mg Tab   Yes No   Sig: Take 81 mg by mouth nightly. atorvastatin (Lipitor) 20 mg tablet   Yes No   Sig: Take 20 mg by mouth nightly. cholecalciferol (VITAMIN D3) 1,000 unit cap   Yes No   Sig: Take  by mouth daily. fluticasone propionate (FLONASE) 50 mcg/actuation nasal spray   No No   Sig: Use 2 sprays in each nostril daily   hyoscyamine SL (Levsin/SL) 0.125 mg SL tablet   No No   Si Tab by SubLINGual route every four (4) hours as needed for Cramping (bladder spasms). insulin aspart (NOVOLOG FLEXPEN U-100 INSULIN SC)   Yes No   Sig: 15 Units by SubCUTAneous route Before breakfast, lunch, and dinner. insulin glargine (Lantus U-100 Insulin) 100 unit/mL injection   Yes No   Si Units by SubCUTAneous route two (2) times a day. ondansetron (ZOFRAN ODT) 4 mg disintegrating tablet   No No   Sig: Take 1 Tab by mouth every eight (8) hours as needed for Nausea. oxyCODONE-acetaminophen (Percocet) 5-325 mg per tablet   No No   Sig: Take 1 Tab by mouth every four (4) hours as needed for Pain for up to 7 days. Max Daily Amount: 6 Tabs. tamsulosin (FLOMAX) 0.4 mg capsule   Yes No   Sig: TAKE 1 CAPSULE BY MOUTH EVERYDAY AT BEDTIME   trimethoprim-sulfamethoxazole (Bactrim DS) 160-800 mg per tablet   No No   Sig: Take 1 Tab by mouth two (2) times a day for 7 days.       Facility-Administered Medications: None     Allergies   Allergen Reactions    Iohexol Anaphylaxis    Other Medication Anaphylaxis     Omnipaque Rediflo 240 (contrast)    Atorvastatin Myalgia     Currently tolerating     Cipro [Ciprofloxacin] Hives    Codeine Unknown (comments)     Makes patient hyperactive    Iodinated Contrast Media Anaphylaxis    Penicillins Rash    Valdecoxib Hives and Rash        PHYSICAL EXAM       Vitals:    03/10/21 0951 03/10/21 0952   BP: (!) 115/59 (!) 115/59   Pulse: 91 84   Resp:  19   Temp:  98.1 °F (36.7 °C)   SpO2: 96% 97%    Vital signs were reviewed. Physical Exam  Vitals signs and nursing note reviewed. Constitutional:       General: He is not in acute distress. Appearance: He is ill-appearing. HENT:      Head: Normocephalic and atraumatic. Mouth/Throat:      Mouth: Mucous membranes are dry. Eyes:      Comments: Pale conjunctiva   Neck:      Musculoskeletal: Normal range of motion. Cardiovascular:      Rate and Rhythm: Normal rate. Pulmonary:      Effort: Pulmonary effort is normal.      Breath sounds: Normal breath sounds. Abdominal:      Palpations: Abdomen is soft. Tenderness: There is no abdominal tenderness. There is no guarding or rebound. Genitourinary:     Rectum: Guaiac result positive. Musculoskeletal: Normal range of motion. Skin:     General: Skin is warm and dry. Coloration: Skin is pale. Neurological:      General: No focal deficit present. Mental Status: He is alert and oriented to person, place, and time. Psychiatric:         Mood and Affect: Mood normal.         Behavior: Behavior normal.         Thought Content:  Thought content normal.         Judgment: Judgment normal.          MEDICAL DECISION MAKING     ED Course:    Recent Results (from the past 8 hour(s))   CBC WITH AUTOMATED DIFF    Collection Time: 03/10/21  9:57 AM   Result Value Ref Range    WBC 13.0 (H) 4.3 - 11.1 K/uL    RBC 3.58 (L) 4.23 - 5.6 M/uL    HGB 10.0 (L) 13.6 - 17.2 g/dL    HCT 31.5 (L) 41.1 - 50.3 %    MCV 88.0 79.6 - 97.8 FL    MCH 27.9 26.1 - 32.9 PG    MCHC 31.7 31.4 - 35.0 g/dL    RDW 13.8 11.9 - 14.6 %    PLATELET 220 638 - 563 K/uL    MPV 10.3 9.4 - 12.3 FL    ABSOLUTE NRBC 0.00 0.0 - 0.2 K/uL    DF AUTOMATED      NEUTROPHILS 80 (H) 43 - 78 %    LYMPHOCYTES 11 (L) 13 - 44 %    MONOCYTES 7 4.0 - 12.0 %    EOSINOPHILS 1 0.5 - 7.8 %    BASOPHILS 1 0.0 - 2.0 %    IMMATURE GRANULOCYTES 1 0.0 - 5.0 %    ABS. NEUTROPHILS 10.4 (H) 1.7 - 8.2 K/UL    ABS. LYMPHOCYTES 1.5 0.5 - 4.6 K/UL    ABS. MONOCYTES 0.9 0.1 - 1.3 K/UL    ABS. EOSINOPHILS 0.1 0.0 - 0.8 K/UL    ABS. BASOPHILS 0.1 0.0 - 0.2 K/UL    ABS. IMM. GRANS. 0.1 0.0 - 0.5 K/UL   METABOLIC PANEL, COMPREHENSIVE    Collection Time: 03/10/21  9:57 AM   Result Value Ref Range    Sodium 138 138 - 145 mmol/L    Potassium 4.5 3.5 - 5.1 mmol/L    Chloride 107 98 - 107 mmol/L    CO2 23 21 - 32 mmol/L    Anion gap 8 7 - 16 mmol/L    Glucose 206 (H) 65 - 100 mg/dL    BUN 62 (H) 8 - 23 MG/DL    Creatinine 2.60 (H) 0.8 - 1.5 MG/DL    GFR est AA 30 (L) >60 ml/min/1.73m2    GFR est non-AA 25 (L) >60 ml/min/1.73m2    Calcium 8.5 8.3 - 10.4 MG/DL    Bilirubin, total 0.3 0.2 - 1.1 MG/DL    ALT (SGPT) 110 (H) 12 - 65 U/L    AST (SGOT) 158 (H) 15 - 37 U/L    Alk. phosphatase 317 (H) 50 - 136 U/L    Protein, total 6.3 6.3 - 8.2 g/dL    Albumin 2.6 (L) 3.2 - 4.6 g/dL    Globulin 3.7 (H) 2.3 - 3.5 g/dL    A-G Ratio 0.7 (L) 1.2 - 3.5     PROTHROMBIN TIME + INR    Collection Time: 03/10/21  9:57 AM   Result Value Ref Range    Prothrombin time 14.3 12.5 - 14.7 sec    INR 1.1     TYPE & SCREEN    Collection Time: 03/10/21 10:30 AM   Result Value Ref Range    Crossmatch Expiration 03/13/2021,2359     ABO/Rh(D) A POSITIVE     Antibody screen NEG      No results found. MDM  Number of Diagnoses or Management Options  Chronic kidney disease, unspecified CKD stage  Hematemesis with nausea  Liver mass  Diagnosis management comments: 30-year-old male presented to the emergency department with complaints of throwing up blood that began last night. Patient with recent ureteral stent placed and recent hospitalization secondary to acute renal failure. Patient is Hemoccult positive today.   His hemoglobin is dropped more than 2 g since he was just in the hospital 4 days ago. Discussed with GI and they will see in consult. Patient will be admitted to the hospital for further treatment evaluation. He was given IV fluids and conically emergency department. Amount and/or Complexity of Data Reviewed  Clinical lab tests: ordered and reviewed  Tests in the radiology section of CPT®: ordered and reviewed  Decide to obtain previous medical records or to obtain history from someone other than the patient: yes  Review and summarize past medical records: yes  Discuss the patient with other providers: yes    Patient Progress  Patient progress: (Guarded)        Disposition: Admitted  Diagnosis:     ICD-10-CM ICD-9-CM   1. Hematemesis with nausea  K92.0 578.0     787.02   2. Chronic kidney disease, unspecified CKD stage  N18.9 585.9   3. Liver mass  R16.0 573.8     ____________________________________________________________________  A portion of this note was generated using voice recognition dictation software. While the note has been reviewed for accuracy, please note certain words and phrases may not be transcribed as intended and some grammatical and/or typographical errors may be present.

## 2021-03-10 NOTE — ED TRIAGE NOTES
Pt arrives from [facility] via EMS c/o black tarry stools and vomit since 7pm last night. Recently seen here for kidney stone and stent placed. Ems states 98/ gave 250ns and 112/74 was last. 80hr sinus. 205bgl with hx. EMS did not give blood.

## 2021-03-10 NOTE — PERIOP NOTES
Patient wife, Darwin New, called PAT stating patient is vomiting what looks like coffee grounds. Beatrice Gonsalves to bring  into ED for evaluation.

## 2021-03-10 NOTE — ADDENDUM NOTE
Addendum  created 03/10/21 1613 by Joslyn Haas CRNA    Flowsheet accepted, Intraprocedure Flowsheets edited

## 2021-03-10 NOTE — PROGRESS NOTES
TRANSFER - IN REPORT:    Verbal report received from [address] on Marga Hilton  being received from ER 11 for ordered procedure      Report consisted of patients Situation, Background, Assessment and   Recommendations(SBAR). Information from the following report(s) SBAR, Intake/Output, MAR, Recent Results, Med Rec Status and Pre Procedure Checklist was reviewed with the receiving nurse. Opportunity for questions and clarification was provided.       A&Ox4, NPO since yesterday, last black vomiting this AM.

## 2021-03-10 NOTE — PERIOP NOTES
TRANSFER - OUT REPORT:    Verbal report given to Conor Patrick RN on [facility]  being transferred to [phone] for routine post - op       Report consisted of patients Situation, Background, Assessment and   Recommendations(SBAR). Information from the following report(s) OR Summary, Procedure Summary, Intake/Output and Accordion was reviewed with the receiving nurse. Lines:   Peripheral IV 03/10/21 Left Antecubital (Active)   Site Assessment Clean, dry, & intact 03/10/21 1535   Phlebitis Assessment 0 03/10/21 1535   Infiltration Assessment 0 03/10/21 1535   Dressing Status Clean, dry, & intact 03/10/21 1535   Dressing Type Tape;Transparent 03/10/21 1535   Hub Color/Line Status Patent 03/10/21 1535       Peripheral IV 03/10/21 Right Forearm (Active)   Site Assessment Clean, dry, & intact 03/10/21 1535   Phlebitis Assessment 0 03/10/21 1535   Infiltration Assessment 0 03/10/21 1535   Dressing Status Clean, dry, & intact 03/10/21 1535   Dressing Type Tape;Transparent 03/10/21 1535   Hub Color/Line Status Patent 03/10/21 1535        Opportunity for questions and clarification was provided. Patient transported with:   O2 @ 3 liters    VTE prophylaxis orders have not been written for [facility]. Patient and family given floor number and nurses name.

## 2021-03-10 NOTE — H&P
[facility]  History and Physical    Patient ID:  Deanna Fowler  male  1/11/1933  [id number]    Admission Date: 3/10/2021  Chief Complaint: vomiting, melena  Reason for Admission: GI bleed    ASSESSMENT & PLAN:  # GIB  - serial h&H  - IV PPI q12  - holding all NSAIDs  - holding all anticoagulants/ asa  - NPO, start IVF  - EGD this afternoon    # Liver mass 6 cm/ elevated LFTs  - note GI has ordered AFP  - scheduled for outpt abd MRI and oncology eval per pt  - follow LFT's    # Chronic pancreatitis  - continue creon supplement    # DM2  - usually takes Lantus 25 units q12 (will reduce to 10 units q12)  - usually takes novolog 20 units qac - will hold will NPO  - continue SSI    # Recent staph UTI  - was taking bactrim at home (?completed 5 day course)  - hold further atbx and repeat UA    # CKD stage 3  - appears Cr baseline around 1.4-1.8  prior to recent admission, no stable at 2.6  - continue IVF and repeat BMP in AM  - follow-up repeat UA as above  - CT urogram on 3/2 - comments on new 6.4 cm liver mass as well as enlarged prostate and ?chronic outlet obstruction. - monitor strict I/O's and add bladder checks    # right obstructive ureteral stone s/p right ureteral stent placement 3/6  - urology reports plans for stent removal in next fefw weeks  - cont flomax        Disposition: home with hhc vs STR suspected  Diet: NPO  VTE ppx: scd  GI ppx: iv ppi bid  CODE STATUS: DNR as d/w pt and daughter at bedside      HISTORY OF PRESENT ILLNESS:  Patient is a 80 y.o. male with medical h/o CKD stage 3, DM2, HTN, HLP, chronic pancreatitis who presented to [facility] ED with complaint of vomiting and melena. Patient recently admitted to [facility] 3/5 - 3/7 r right ureteral stone requiring stent placement. CT at that time also notable for 6cm liver mass. He was scheduled for outpatient abd mri and oncology eval. Also treated for Staph epidermis UTI with bactrim.    Pt reports coffee ground emesis began last night several episodes as well as a few episodes of melena. Pt actively having hematemesis in the ER during my interview. He is on ASA at home but does not take other blood thinners or OTC NSAIDs. He admits to some mid epigastric \"discomfort\". ER workup noatble for hgb 10 (12.7 3/6), Cr 2.6 (was 2.6 on 3/7 and 1.8 on 3). ALT//158 (previously wnl). Occult positive. Gi has been consulted by ED with plans for EGD this afternoon. Allergies   Allergen Reactions    Iohexol Anaphylaxis    Other Medication Anaphylaxis     Omnipaque Rediflo 240 (contrast)    Atorvastatin Myalgia     Currently tolerating     Cipro [Ciprofloxacin] Hives    Codeine Unknown (comments)     Makes patient hyperactive    Iodinated Contrast Media Anaphylaxis    Penicillins Rash    Valdecoxib Hives and Rash       Prior to Admission Medications   Prescriptions Last Dose Informant Patient Reported? Taking? FISH OIL 1,000 mg Cap   Yes No   Sig: Take 1 Cap by mouth daily. Am.  1 tsp liquid- concentrated  Last dose 14   ZINC OXIDE PO   Yes No   Sig: Take  by mouth daily. amylase-lipase-protease (CREON) 12,000-38,000 -60,000 unit capsule   Yes No   Sig: Take  by mouth three (3) times daily (with meals). aspirin 81 mg Tab   Yes No   Sig: Take 81 mg by mouth nightly. atorvastatin (Lipitor) 20 mg tablet   Yes No   Sig: Take 20 mg by mouth nightly. cholecalciferol (VITAMIN D3) 1,000 unit cap   Yes No   Sig: Take  by mouth daily. fluticasone propionate (FLONASE) 50 mcg/actuation nasal spray   No No   Sig: Use 2 sprays in each nostril daily   hyoscyamine SL (Levsin/SL) 0.125 mg SL tablet   No No   Si Tab by SubLINGual route every four (4) hours as needed for Cramping (bladder spasms). insulin aspart (NOVOLOG FLEXPEN U-100 INSULIN SC)   Yes No   Sig: 15 Units by SubCUTAneous route Before breakfast, lunch, and dinner.    insulin glargine (Lantus U-100 Insulin) 100 unit/mL injection   Yes No   Si Units by SubCUTAneous route two (2) times a day. ondansetron (ZOFRAN ODT) 4 mg disintegrating tablet   No No   Sig: Take 1 Tab by mouth every eight (8) hours as needed for Nausea. oxyCODONE-acetaminophen (Percocet) 5-325 mg per tablet   No No   Sig: Take 1 Tab by mouth every four (4) hours as needed for Pain for up to 7 days. Max Daily Amount: 6 Tabs. tamsulosin (FLOMAX) 0.4 mg capsule   Yes No   Sig: TAKE 1 CAPSULE BY MOUTH EVERYDAY AT BEDTIME   trimethoprim-sulfamethoxazole (Bactrim DS) 160-800 mg per tablet   No No   Sig: Take 1 Tab by mouth two (2) times a day for 7 days. Facility-Administered Medications: None       Past Medical History:   Diagnosis Date    Abnormal CT of liver 03/02/2021    New heterogeneous liver mass measuring up to 6.4 cm.  Further evaluation required    AR (allergic rhinitis) 8/3/2016    Arthritis     Benign paroxysmal positional vertigo     Bilateral impacted cerumen     BPH (benign prostatic hyperplasia)     CAD (coronary artery disease)     Followed by Dr Gisell Pink at [facility] Chronic otitis media    [facility] Diverticulitis 8/3/2016    DNS (deviated nasal septum)     ETD (eustachian tube dysfunction)     H/O heart artery stent     S/P RCA stent by Dr. Natalie Farris in 2003    Heart disease 8/3/2016    High frequency hearing loss     History of bilateral inguinal hernia repair     History of gastroesophageal reflux (GERD)     History of pancreatitis     History of squamous cell carcinoma     left forearm near wrist    Hypercholesterolemia     Hyperlipidemia     Hypertrophy of both inferior nasal turbinates     Kidney stone     MHL (mixed hearing loss)     NO (nasal obstruction)     Otitis media, nonsuppurative     SNHL (sensorineural hearing loss) 8/3/2016    Type 2 diabetes mellitus (HCC)     insulin reliant/AVG FBS: 100-120/s.s of hypoglycemia at 70/last A1c:7.5    Unspecified sleep apnea     no cpap;     Vertigo      Past Surgical History: Procedure Laterality Date    HX APPENDECTOMY      HX CATARACT REMOVAL Bilateral     HX COLONOSCOPY      HX HERNIA REPAIR Bilateral     Dr Anjali Bahena 30 years ago.  HX MASTOIDECTOMY  1996    right modified canal wall down    HX OTHER SURGICAL      tube right ear    HX OTHER SURGICAL      skin lesion; local excision: SCC    HX OTHER SURGICAL  1/14/03    Dupuytrens contracture release    HX TURP      HX VITRECTOMY      NM CARDIAC SURG PROCEDURE UNLIST      stent       Social History     Tobacco Use    Smoking status: Never Smoker    Smokeless tobacco: Never Used   Substance Use Topics    Alcohol use: No       FAMILY HISTORY:  revewed as below  Family History   Problem Relation Age of Onset    Cancer Maternal Grandmother         colon    Heart Disease Maternal Grandfather        REVIEW OF SYSTEMS:  A 14 point review of systems was taken and pertinent positive as per HPI.       PHYSICAL EXAM:    Visit Vitals  BP (!) 115/59 (BP 1 Location: Right upper arm, BP Patient Position: At rest)   Pulse 84   Temp 98.1 °F (36.7 °C)   Resp 19   Ht 5' 9\" (1.753 m)   Wt 83.9 kg (185 lb)   SpO2 97%   BMI 27.32 kg/m²       General: No acute distress, speaking in full sentences, no use of accessory muscles   HEENT: Pupils equal and reactive to light and accommodation, oropharynx is clear   Neck: Supple, no lymphadenopathy, no JVD   Lungs: Clear to auscultation bilaterally   Cardiovascular: Regular rate and rhythm with normal S1 and S2   Abdomen: Soft, nontender, nondistended, normoactive bowel sounds   Extremities: No cyanosis clubbing or edema   Neuro: Nonfocal, A&O x3   Psych: Normal mood and affect     Intake/Output last 3 shifts:        Labs:  CMP:   Lab Results   Component Value Date/Time     03/10/2021 09:57 AM    K 4.5 03/10/2021 09:57 AM     03/10/2021 09:57 AM    CO2 23 03/10/2021 09:57 AM    AGAP 8 03/10/2021 09:57 AM     (H) 03/10/2021 09:57 AM    BUN 62 (H) 03/10/2021 09:57 AM    CREA 2.60 (H) 03/10/2021 09:57 AM    GFRAA 30 (L) 03/10/2021 09:57 AM    GFRNA 25 (L) 03/10/2021 09:57 AM    CA 8.5 03/10/2021 09:57 AM    ALB 2.6 (L) 03/10/2021 09:57 AM    TBILI 0.3 03/10/2021 09:57 AM    TP 6.3 03/10/2021 09:57 AM    GLOB 3.7 (H) 03/10/2021 09:57 AM    AGRAT 0.7 (L) 03/10/2021 09:57 AM     (H) 03/10/2021 09:57 AM         CBC:    Lab Results   Component Value Date/Time    WBC 13.0 (H) 03/10/2021 09:57 AM    HGB 10.0 (L) 03/10/2021 09:57 AM    HCT 31.5 (L) 03/10/2021 09:57 AM     03/10/2021 09:57 AM       Lab Results   Component Value Date/Time    INR 1.1 03/10/2021 09:57 AM    Prothrombin time 14.3 03/10/2021 09:57 AM       ABG:  No results found for: PH, PHI, PCO2, PCO2I, PO2, PO2I, HCO3, HCO3I, FIO2, FIO2I        Lab Results   Component Value Date/Time    CK 97 08/01/2012 09:20 AM    Troponin-I, Qt. <0.02 (L) 01/26/2019 06:48 PM         Imaging & Other Studies:    XR Results (maximum last 3): Results from [place] encounter on 01/26/19   XR CHEST SNGL V    Narrative EXAMINATION: CHEST RADIOGRAPH 1/26/2019 7:05 PM    ACCESSION NUMBER: [id number]    INDICATION: chest pain    COMPARISON: Chest x-ray 12/5/2016    TECHNIQUE: A single AP view of the chest was obtained. FINDINGS:     Support Lines and Tubes: None    Cardiac Silhouette: Within normal limits in size. Lungs: No focal airspace disease. Pleura: No pleural effusion. No pneumothorax. Osseous Structures: Bilateral acromioclavicular joint arthropathy. Upper Abdomen: Unremarkable. Impression IMPRESSION:    Cardiac silhouette within normal limits. No focal airspace disease. VOICE DICTATED BY: Dr. Guo Patient   Results from [facility] on 12/04/16   XR CHEST PORT    Narrative Portable chest xray      COMPARISON: June 18, 2014    INDICATION: Fever    FINDINGS:     Lungs are underinflated. No focal consolidation, pleural effusion or  pneumothorax. No pulmonary edema.  Cardiomediastinal contour is within normal  limits. Surrounding bones are intact. Impression IMPRESSION:    Underinflated lungs. No focal consolidation. Results from [place] encounter on 12/21/15   XR ERCP / ERCB COMBINED    Narrative ERCP    History: Common bile duct stone. 5 fluoroscopic images were submitted from an ERCP performed by Dr. Tonya Morgan. Comparison: None. Findings: The common hepatic and common bile ducts appear normal in caliber  without definite filling defect. There was rapid tapering of the common bile  duct distally which could represent changes of ampullary stenosis. There was  only partial opacification of the central hepatic ducts which appear grossly  unremarkable. There is partial filling of the cystic duct. A balloon sweep with  return of sludge was reportedly performed along with papillotomy. 1.01 minutes of fluoroscopy was used during this exam.      Impression Impression: Relative rapid tapering of the common bile duct could represent  changes of underlying ampullary stenosis. Please refer to the procedural report  for further description and detail. CT Results (maximum last 3): Results from [place] encounter on 03/02/21   CT UROGRAM WO CONT    Narrative EXAM: CT of the abdomen and pelvis without contrast.  Indication: Right flank pain  Comparison: CT abdomen and pelvis, 12/4/2016    Multiple axial images were obtained through the abdomen and pelvis without IV  contrast.  Radiation dose reduction techniques were used for this study: All CT  scans performed at this facility use one or all of the following: Automated  exposure control, adjustment of the mA and/or kVp according to patient's size,  iterative reconstruction. FINDINGS:  LOWER THORAX: There is subsegmental atelectasis/scarring involving the lung  bases with elevation of the left hemidiaphragm.     LIVER: There is a new heterogeneous 6.4 x 3.7 x 5.3 cm mass along the inferior  tip of the right hepatic lobe. No other definitive liver lesions are seen though  evaluation is limited without IV contrast.    BILIARY: The gallbladder is normal. No biliary duct dilation. SPLEEN: No splenomegaly. PANCREAS: Sequela of chronic pancreatitis with numerous pancreatic parenchymal  calcifications several calcifications in a linear distribution in the pancreatic  head likely represent pancreatic duct calculi. No acute inflammatory changes. ADRENALS: No adrenal nodule or adrenal hypertrophy. URINARY SYSTEM: There are multiple simple cysts bilaterally. There is an  obstructing 4 mm calculus in the proximal right ureter resulting in mild right  hydroureteronephrosis with right perinephric inflammatory stranding. There is an  additional nonobstructing right interpolar calculus measuring 3 mm. No left  renal calculi. There is mild diffuse urinary bladder wall thickening without  significant surrounding inflammatory changes. BOWEL: Stomach, small bowel, and large bowel are normal in course and caliber. No evidence of obstruction. The appendix is surgically absent. Colonic  diverticulosis without evidence of acute diverticulitis. VASCULAR: The abdominal aorta and iliac arterial system are nonaneurysmal with  advanced atherosclerosis. NODES: No lymphadenopathy. FLUID:  No free fluid. REPRODUCTIVE: Prostatomegaly. BONES/SOFT TISSUE: Small bilateral fat-containing inguinal hernias. Prior lower  abdominal wall hernia repair. No acute or aggressive osseous abnormality. Impression 1. Obstructing 4 mm calculus in the proximal right ureter resulting in mild  right hydroureteronephrosis with perinephric inflammatory stranding. 2. Additional nonobstructing 3 mm right renal calculus. 3. New heterogeneous liver mass measuring up to 6.4 cm. Differential  considerations include metastatic and primary liver neoplasms.  Evaluation of the  solid organs is limited without IV contrast. Consider follow-up examination with  contrast.  4. Chronic pancreatitis. 5. Prostatomegaly with urinary bladder wall thickening, possibly related to  chronic outlet obstruction. Results from [place] encounter on 12/04/16   CT ABD PELV WO CONT    Narrative CT abdomen and pelvis without contrast     COMPARISON: August 1, 2012. INDICATION: Abdominal pain. History of pancreatitis. .    TECHNIQUE: CT imaging of the abdomen and pelvis was performed without  intravenous contrast. Radiation dose reduction techniques were used for this  study:  Our CT scanners use one or all of the following: Automated exposure  control, adjustment of the mA and/or kVp according to patient's size, iterative  reconstruction. FINDINGS:    There is dependent atelectasis at the lung bases. Abdomen findings:    Sensitivity is diminished due to the absence of IV contrast.    Pancreas is atrophic. Calcifications within the pancreas consistent with chronic  pancreatitis. The nonenhanced liver, gallbladder, spleen, and adrenal glands are unremarkable. Abdominal aorta is normal in course and caliber with atherosclerotic  calcifications. The stomach is normal in contour. Small bowel loops are of normal caliber. No  small bowel obstruction. There is haziness within the mesentery and associated tiny lymph nodes, similar  to prior exam and most compatible with benign mesenteric panniculitis. There are  stable bilateral renal cysts. There is a tiny nonobstructing right renal  calculus. Pelvic findings: The colon is normal in course and caliber. Appendix is not seen and presumed  surgically absent. Urinary bladder is normal in contour. Prostate gland is  enlarged, similar to prior exam.    There is no free fluid or free air. There are degenerative changes of the spine. Bones are osteopenic. Lucency in the T12 vertebral body, likely hemangioma. Impression IMPRESSION:    1.   No acute findings are identified in the abdomen or pelvis. No colitis or  diverticulitis. No bowel obstruction. 2. Punctate nonobstructing right renal calculus. No hydronephrosis. Results from [place] encounter on 12   CT ABD PELV WO CONT    Narrative [address]                    [id number] [facility]                                            David Sanchez                                                            COMPUTED TOMOGRAPHY                 NAME: Babs Ramon                                                       PT : 1933               SEX: M         MR#: [id number]     LOCATION/NS: [id number]             AGE: 79Y      ACCT: [de-identified]     ORDERING: [facility] HAYNES                        PT TYPE: I                         RADIOLOGIST: [address] [facility] ([id number])  **Final Report**       ICD Codes / Adm. Diagnosis:    /     Examination:  CT ABD PELVIS WO CON  - [id number] - Aug  1 2012  3:49PM    Reason:  PANCREATITIS    REPORT:  Patient Name:  Chelsea Shahid  Medical Record #:  [id number]   Procedure:  CT abdomen and pelvis without contrast  Date and Time of Procedure:  2012 at 1534 hours. CT abdomen and pelvis without contrast 2012    HISTORY: Abdominal pain, nausea vomiting and diarrhea. History of   pancreatitis. Technique: Helically acquired images were obtained from the domes of the   diaphragm to the ischial tuberosities reconstructed at 5 mm thickness after   oral contrast only. Intravenous contrast was not administered due to   reported allergy (anaphylaxis). CT abdomen without contrast: There is mild bilateral lower lobe subsegmental   atelectasis or scar. The liver, spleen and adrenal glands are unremarkable   on this noncontrast study. Numerous calcifications are present throughout   the pancreas compatible with chronic pancreatitis. There are no convincing   surrounding inflammatory changes to suggest acute pancreatitis.     There several low-density lesions involving the kidneys. The largest of   these on the left at the lower pole measures 2.8 cm most compatible with a   cyst. At the midpole right kidney there is a 2.3 cm cyst. Other low-density   lesions are too small to characterize. Also the midpole right kidney there   is a 3 mm nonobstructing calculus. No adenopathy or ascites is present. There are no inflammatory changes. Atherosclerotic changes are present involving the abdominal aorta without   aneurysmal dilatation. Surgical clips are suspected at the right lower   quadrant suggesting prior appendectomy. CT pelvis: No adenopathy or ascites is present. There are no inflammatory   changes. The prostate gland appears mildly enlarged. Few scattered sigmoid   diverticula are present. There is bilateral spondylolysis at L5 with grade 1   anterolisthesis. IMPRESSION:  1. Findings most compatible with chronic pancreatitis. 2. Few scattered sigmoid diverticula. 3. Low density renal lesions many of which are too small to characterize. The larger of these are most compatible with cysts. 4. Punctate nonobstructing right renal calculus. Signing/Reading Doctor: David Peacock DO ([id number])    [facility][id number][facility]  08/01/2012                                      MRI Results (maximum last 3): No results found for this or any previous visit. Nuclear Medicine Results (maximum last 3): Results from Abstract encounter on 01/30/19   NM MYOCARDIAL PERFUSION MULTIPLE       US Results (maximum last 3): No results found for this or any previous visit. DEXA Results (maximum last 3): No results found for this or any previous visit. MARGIE Results (maximum last 3): No results found for this or any previous visit. IR Results (maximum last 3): No results found for this or any previous visit. VAS/US Results (maximum last 3):   Results from Abstract encounter on 01/30/19   ANKLE BRACHIAL INDEX       PET Results (maximum last 3): No results found for this or any previous visit.        EKG Results     None          Active Problems:  Patient Active Problem List    Diagnosis Date Noted    GI bleed 03/10/2021    Right ureteral stone 03/05/2021    Mixed hyperlipidemia 11/18/2020    Family history of MI (myocardial infarction) 11/18/2020    Essential hypertension with goal blood pressure less than 130/85 11/18/2020    Type 2 diabetes mellitus without complication ([facility] [address] 50/49/1743    Atherosclerosis of native coronary artery of native heart 03/05/2019    History of PTCA 02/07/2019    Coronary artery disease involving native heart 02/07/2019    Coronary artery disease involving native coronary artery of native heart without angina pectoris 02/07/2019    Chest pain at rest 02/07/2019    Personal history of allergy to radiographic contrast media - anaphylaxis 02/07/2019    Stage 3 chronic kidney disease 02/07/2019    Gastroenteritis 12/06/2016    Vasovagal syncope 12/05/2016    Dizziness 12/05/2016    Weakness generalized 12/05/2016    Heart disease 08/03/2016    Diverticulitis 08/03/2016    AR (allergic rhinitis) 08/03/2016    SNHL (sensorineural hearing loss) 08/03/2016    Chronic otitis media     Hypertrophy of both inferior nasal turbinates     Inguinal hernia, left 05/01/2014    Acute pancreatitis 08/01/2012    Diabetes mellitus ([facility] [address] 08/01/2012    Hyperlipidemia 08/01/2012    CAD (coronary artery disease) 08/01/2012         DO [facility]  3/10/2021 12:07 PM

## 2021-03-10 NOTE — CONSULTS
[facility] Consult Note       Primary GI Physician: Dr. Boykin Felty    Referring Provider:  Dr. Allie Urbina Date:  3/10/2021    Admit Date:  3/10/2021    Chief Complaint: Coffee ground emesis    Subjective:     History of Present Illness:  Patient is a 80 y.o. male with PMH of including but not limited to, recent obstructive calculus with stent placement on 3/7, new liver lesion with current oncology evaluation and normal AFP with pending MRI, chronic pancreatitis followed by Dr. Kwasi Oneill on Creon therapy, DM, TARIQ, claustrophobia who is seen in consultation at the request of Dr. Jame Raymundo for coffee ground emesis. Mr. Margie Coronado developed sudden onset vomiting last night with coffee ground emesis multiple times. He had one episode of melena following this. He has had no vomiting since his arrival to the ED. He has a history of GERD and took omeprazole for years. He stopped this about 6 years ago and has been on OTC antacids and baking soda with relief. He denies any abdominal pain, dysphagia, NSAIDS, tobacco or ETOH. He denies any anticoagulants. He last had anything to eat or drink at 6pm last night. On evaluation in the ED, he was found to have a Hgb of 10 (down from 12.7 on 3/6). BUN is elevated at 62, creat 2.60. He saw Dr. Kwasi Oneill on 2/26/2021 for diarrhea. He was instructed to take his Creon 2 pills prior to meals and 1 prior to snacks and his diarrhea resolved. He has a known history of chronic pancreatitis and chronic pancreatic insufficiency. He had an EUS on 7/12/2013 by Dr. Kwasi Oneill that confirmed chronic pancreatic changes and a single pancreatic stone. He underwent ERCP by Dr. Opal Jewell on 12/21/2015 that revealed stenotic biliary stricture and gastroduodenitis. Colonoscopy on 10/13/2017 revealed internal hemorrhoids, diverticular disease and tubular adenomas. This was discussed at his last ov and it was decided to do no further screenings due to his age and current health. EXAM: CT of the abdomen and pelvis without contrast on 3/2/2021  Indication: Right flank pain  Comparison: CT abdomen and pelvis, 12/4/2016     Multiple axial images were obtained through the abdomen and pelvis without IV  contrast.  Radiation dose reduction techniques were used for this study: All CT  scans performed at this facility use one or all of the following: Automated  exposure control, adjustment of the mA and/or kVp according to patient's size,  iterative reconstruction.     FINDINGS:  LOWER THORAX: There is subsegmental atelectasis/scarring involving the lung  bases with elevation of the left hemidiaphragm.     LIVER: There is a new heterogeneous 6.4 x 3.7 x 5.3 cm mass along the inferior  tip of the right hepatic lobe. No other definitive liver lesions are seen though  evaluation is limited without IV contrast.     BILIARY: The gallbladder is normal. No biliary duct dilation.     SPLEEN: No splenomegaly.     PANCREAS: Sequela of chronic pancreatitis with numerous pancreatic parenchymal  calcifications several calcifications in a linear distribution in the pancreatic  head likely represent pancreatic duct calculi. No acute inflammatory changes.     ADRENALS: No adrenal nodule or adrenal hypertrophy.     URINARY SYSTEM: There are multiple simple cysts bilaterally. There is an  obstructing 4 mm calculus in the proximal right ureter resulting in mild right  hydroureteronephrosis with right perinephric inflammatory stranding. There is an  additional nonobstructing right interpolar calculus measuring 3 mm. No left  renal calculi. There is mild diffuse urinary bladder wall thickening without  significant surrounding inflammatory changes.     BOWEL: Stomach, small bowel, and large bowel are normal in course and caliber. No evidence of obstruction. The appendix is surgically absent.  Colonic  diverticulosis without evidence of acute diverticulitis.     VASCULAR: The abdominal aorta and iliac arterial system are nonaneurysmal with  advanced atherosclerosis.     NODES: No lymphadenopathy.     FLUID:  No free fluid.     REPRODUCTIVE: Prostatomegaly.     BONES/SOFT TISSUE: Small bilateral fat-containing inguinal hernias. Prior lower  abdominal wall hernia repair. No acute or aggressive osseous abnormality.        IMPRESSION     1. Obstructing 4 mm calculus in the proximal right ureter resulting in mild  right hydroureteronephrosis with perinephric inflammatory stranding. 2. Additional nonobstructing 3 mm right renal calculus. 3. New heterogeneous liver mass measuring up to 6.4 cm. Differential  considerations include metastatic and primary liver neoplasms. Evaluation of the  solid organs is limited without IV contrast. Consider follow-up examination with  contrast.  4. Chronic pancreatitis. 5. Prostatomegaly with urinary bladder wall thickening, possibly related to  chronic outlet obstruction.              PMH:  Past Medical History:   Diagnosis Date    Abnormal CT of liver 03/02/2021    New heterogeneous liver mass measuring up to 6.4 cm.  Further evaluation required    AR (allergic rhinitis) 8/3/2016    Arthritis     Benign paroxysmal positional vertigo     Bilateral impacted cerumen     BPH (benign prostatic hyperplasia)     CAD (coronary artery disease)     Follow[facility] by Dr Timothy Chan at [facility] Chronic otitis media     Claustrophobia     Diverticulitis 8/3/2016    DNS (deviated nasal septum)     ETD (eustachian tube dysfunction)     H/O heart artery stent     S/P RCA stent by Dr. 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    Hypercholesterolemia     Hyperlipidemia     Hypertrophy of both inferior nasal turbinates     Kidney stone     MHL (mixed hearing loss)     NO (nasal obstruction)     Otitis media, nonsuppurative     SNHL (sensorineural hearing loss) 8/3/2016    Type 2 diabetes mellitus (HCC)     insulin reliant/AVG FBS: 100-120/s.s of hypoglycemia at 70/last A1c:7.5    Unspecified sleep apnea     no cpap;     Vertigo        PSH:  Past Surgical History:   Procedure Laterality Date    HX APPENDECTOMY      HX CATARACT REMOVAL Bilateral     HX COLONOSCOPY      HX HERNIA REPAIR Bilateral     Dr Wang Earl 30 years ago.  HX MASTOIDECTOMY  1996    right modified canal wall down    HX OTHER SURGICAL      tube right ear    HX OTHER SURGICAL      skin lesion; local excision: SCC    HX OTHER SURGICAL  1/14/03    Dupuytrens contracture release    HX TURP      HX VITRECTOMY      MS CARDIAC SURG PROCEDURE UNLIST      stent       Allergies: Allergies   Allergen Reactions    Iohexol Anaphylaxis    Other Medication Anaphylaxis     Omnipaque Rediflo 240 (contrast)    Atorvastatin Myalgia     Currently tolerating     Cipro [Ciprofloxacin] Hives    Codeine Unknown (comments)     Makes patient hyperactive    Iodinated Contrast Media Anaphylaxis    Penicillins Rash    Valdecoxib Hives and Rash       Home Medications:  Prior to Admission medications    Medication Sig Start Date End Date Taking? Authorizing Provider   oxyCODONE-acetaminophen (Percocet) 5-325 mg per tablet Take 1 Tab by mouth every four (4) hours as needed for Pain for up to 7 days. Max Daily Amount: 6 Tabs. 3/7/21 3/14/21  Gabi Rojas MD   hyoscyamine SL (Levsin/SL) 0.125 mg SL tablet 1 Tab by SubLINGual route every four (4) hours as needed for Cramping (bladder spasms). 3/7/21   Gabi Rojas MD   trimethoprim-sulfamethoxazole (Bactrim DS) 160-800 mg per tablet Take 1 Tab by mouth two (2) times a day for 7 days. 3/7/21 3/14/21  Gabi Rojas MD   insulin glargine (Lantus U-100 Insulin) 100 unit/mL injection 25 Units by SubCUTAneous route two (2) times a day.     Provider, Historical   atorvastatin (Lipitor) 20 mg tablet Take 20 mg by mouth nightly. Provider, Historical   ZINC OXIDE PO Take  by mouth daily. Provider, Historical   ondansetron (ZOFRAN ODT) 4 mg disintegrating tablet Take 1 Tab by mouth every eight (8) hours as needed for Nausea. 3/2/21   Viviana Chow MD   fluticasone propionate (FLONASE) 50 mcg/actuation nasal spray Use 2 sprays in each nostril daily 12/29/20   Romero Rojas MD   insulin aspart (NOVOLOG FLEXPEN U-100 INSULIN SC) 15 Units by SubCUTAneous route Before breakfast, lunch, and dinner. Provider, Historical   tamsulosin (FLOMAX) 0.4 mg capsule TAKE 1 CAPSULE BY MOUTH EVERYDAY AT BEDTIME 7/14/20   Provider, Historical   cholecalciferol (VITAMIN D3) 1,000 unit cap Take  by mouth daily. Provider, Historical   amylase-lipase-protease (CREON) 12,000-38,000 -60,000 unit capsule Take  by mouth three (3) times daily (with meals). Other, MD [place]   FISH OIL 1,000 mg Cap Take 1 Cap by mouth daily. Am.  1 tsp liquid- concentrated  Last dose 5/13/14    Provider, Historical   aspirin 81 mg Tab Take 81 mg by mouth nightly. Provider, Historical       Hospital Medications:  No current facility-administered medications for this encounter. Current Outpatient Medications   Medication Sig    oxyCODONE-acetaminophen (Percocet) 5-325 mg per tablet Take 1 Tab by mouth every four (4) hours as needed for Pain for up to 7 days. Max Daily Amount: 6 Tabs.  hyoscyamine SL (Levsin/SL) 0.125 mg SL tablet 1 Tab by SubLINGual route every four (4) hours as needed for Cramping (bladder spasms).  trimethoprim-sulfamethoxazole (Bactrim DS) 160-800 mg per tablet Take 1 Tab by mouth two (2) times a day for 7 days.  insulin glargine (Lantus U-100 Insulin) 100 unit/mL injection 25 Units by SubCUTAneous route two (2) times a day.  atorvastatin (Lipitor) 20 mg tablet Take 20 mg by mouth nightly.  ZINC OXIDE PO Take  by mouth daily.     ondansetron (ZOFRAN ODT) 4 mg disintegrating tablet Take 1 Tab by mouth every eight (8) hours as needed for Nausea.  fluticasone propionate (FLONASE) 50 mcg/actuation nasal spray Use 2 sprays in each nostril daily    insulin aspart (NOVOLOG FLEXPEN U-100 INSULIN SC) 15 Units by SubCUTAneous route Before breakfast, lunch, and dinner.  tamsulosin (FLOMAX) 0.4 mg capsule TAKE 1 CAPSULE BY MOUTH EVERYDAY AT BEDTIME    cholecalciferol (VITAMIN D3) 1,000 unit cap Take  by mouth daily.  amylase-lipase-protease (CREON) 12,000-38,000 -60,000 unit capsule Take  by mouth three (3) times daily (with meals).  FISH OIL 1,000 mg Cap Take 1 Cap by mouth daily. Am.  1 tsp liquid- concentrated  Last dose 5/13/14    aspirin 81 mg Tab Take 81 mg by mouth nightly. Social History:  Social History     Tobacco Use    Smoking status: Never Smoker    Smokeless tobacco: Never Used   Substance Use Topics    Alcohol use: No       Family History:  Family History   Problem Relation Age of Onset    Cancer Maternal Grandmother         colon    Heart Disease Maternal Grandfather        Review of Systems:  A detailed 10 system ROS is obtained, with pertinent positives as listed above. All others are negative. Diet:  NPO    Objective:     Physical Exam:  Vitals:  Visit Vitals  BP (!) 115/59 (BP 1 Location: Right upper arm, BP Patient Position: At rest)   Pulse 84   Temp 98.1 °F (36.7 °C)   Resp 19   Ht 5' 9\" (1.753 m)   Wt 83.9 kg (185 lb)   SpO2 97%   BMI 27.32 kg/m²     Gen:  Pt is alert, cooperative, no acute distress DAUGHTER AT BEDSIDE  Skin:  Extremities and face reveal no rashes. HEENT: Sclerae anicteric. Extra-occular muscles are intact. No oral ulcers. No abnormal pigmentation of the lips. The neck is supple. HARD OF HEARING  Cardiovascular: Regular rate and rhythm. No murmurs, gallops, or rubs. Respiratory:  Comfortable breathing with no accessory muscle use. Clear breath sounds anteriorly with no wheezes, rales, or rhonchi.   GI:  Abdomen nondistended, soft, and nontender. Normal active bowel sounds. No enlargement of the liver or spleen. No masses palpable. Musculoskeletal:  No pitting edema of the lower legs. Neurological:  Gross memory appears intact. Patient is alert and oriented. Psychiatric:  Mood appears appropriate with judgement intact. Lymphatic:  No cervical or supraclavicular adenopathy. Laboratory:    Recent Labs     03/10/21  0957   WBC 13.0*   HGB 10.0*   HCT 31.5*      MCV 88.0      K 4.5      CO2 23   BUN 62*   CREA 2.60*   CA 8.5   *   *   *   TBILI 0.3   ALB 2.6*   TP 6.3   PTP 14.3   INR 1.1          Assessment:     Active Problems:    GI bleed (3/10/2021)    80 y.o. male with PMH of including but not limited to, recent obstructive calculus with stent placement on 3/7, new liver lesion with current oncology evaluation and normal AFP with pending MRI, chronic pancreatitis followed by Dr. Red Solo on Creon therapy, DM, TARIQ, claustrophobia who is seen in consultation at the request of Dr. Jazmín Sanderson for coffee ground emesis. Mr. Jasmin Jones developed sudden onset vomiting last night with coffee ground emesis multiple times. He had one episode of melena following this. He has had no vomiting since his arrival to the ED. On evaluation in the ED, he was found to have a Hgb of 10 (down from 12.7 on 3/6). BUN is elevated at 62, creat 2.60. This may represent PUD, malignancy, AVM or Dieulafoy lesion. Plan:     -EGD today with Dr. Mary Vela. Risks and benefits discussed with patient to include risk of infection, bleeding, perforation, anesthesia, and possible mortality. He verbalized understanding and wishes to proceed with EGD  -Pantoprazole 40mg IV bid  -Keep NPO until procedure  -Serial H&H and transfuse as needed  -Continue follow up with Dr. Karishma Reyes, oncology regarding new liver mass.  Scheduled for outpatient MRI  -we will obtain Hepatitis panel (since not previously obtained) to make sure he doesn't have chronic hepatitis that could have resulted in hepatocellular carcinoma  -He has a current elevation of this LFTS. This may be secondary to new liver mass or possible drug induced injury with recent antibiotics after renal stent  -Daily LFTS   -PT INR    Cortez Diana. Lakesha Jasso, [facility]  [facility]    Patient is seen and examined in collaboration with Dr. Caroline Escobar. Assessment and plan as per Dr. Caroline Escobar.

## 2021-03-10 NOTE — ED NOTES
TRANSFER - OUT REPORT:    Verbal report given to Denizkhadijah Leary on Dimitry Carrow  being transferred to GI lab for routine progression of care       Report consisted of patients Situation, Background, Assessment and   Recommendations(SBAR). Information from the following report(s) SBAR was reviewed with the receiving nurse. Lines:   Peripheral IV 03/10/21 Left Antecubital (Active)   Site Assessment Clean, dry, & intact 03/10/21 0954       Peripheral IV 03/10/21 Right Forearm (Active)   Site Assessment Clean, dry, & intact 03/10/21 1008        Opportunity for questions and clarification was provided.       Patient transported with:   [employer]

## 2021-03-11 ENCOUNTER — ANESTHESIA (OUTPATIENT)
Dept: ENDOSCOPY | Age: 86
DRG: 381 | End: 2021-03-11
Payer: MEDICARE

## 2021-03-11 LAB
AFP-TM SERPL-MCNC: 4.9 NG/ML
ALBUMIN SERPL-MCNC: 2.1 G/DL (ref 3.2–4.6)
ALBUMIN/GLOB SERPL: 0.8 {RATIO} (ref 1.2–3.5)
ALP SERPL-CCNC: 171 U/L (ref 50–136)
ALT SERPL-CCNC: 51 U/L (ref 12–65)
ANION GAP SERPL CALC-SCNC: 4 MMOL/L (ref 7–16)
AST SERPL-CCNC: 45 U/L (ref 15–37)
BASOPHILS # BLD: 0.1 K/UL (ref 0–0.2)
BASOPHILS NFR BLD: 1 % (ref 0–2)
BILIRUB DIRECT SERPL-MCNC: 0.1 MG/DL
BILIRUB SERPL-MCNC: 0.3 MG/DL (ref 0.2–1.1)
BUN SERPL-MCNC: 62 MG/DL (ref 8–23)
CALCIUM SERPL-MCNC: 8.3 MG/DL (ref 8.3–10.4)
CHLORIDE SERPL-SCNC: 117 MMOL/L (ref 98–107)
CO2 SERPL-SCNC: 23 MMOL/L (ref 21–32)
CREAT SERPL-MCNC: 2.29 MG/DL (ref 0.8–1.5)
DIFFERENTIAL METHOD BLD: ABNORMAL
EOSINOPHIL # BLD: 0.4 K/UL (ref 0–0.8)
EOSINOPHIL NFR BLD: 4 % (ref 0.5–7.8)
ERYTHROCYTE [DISTWIDTH] IN BLOOD BY AUTOMATED COUNT: 14.3 % (ref 11.9–14.6)
GLOBULIN SER CALC-MCNC: 2.7 G/DL (ref 2.3–3.5)
GLUCOSE BLD STRIP.AUTO-MCNC: 124 MG/DL (ref 65–100)
GLUCOSE BLD STRIP.AUTO-MCNC: 131 MG/DL (ref 65–100)
GLUCOSE BLD STRIP.AUTO-MCNC: 136 MG/DL (ref 65–100)
GLUCOSE BLD STRIP.AUTO-MCNC: 243 MG/DL (ref 65–100)
GLUCOSE BLD STRIP.AUTO-MCNC: 324 MG/DL (ref 65–100)
GLUCOSE SERPL-MCNC: 120 MG/DL (ref 65–100)
HCT VFR BLD AUTO: 21.7 % (ref 41.1–50.3)
HCT VFR BLD AUTO: 24.1 % (ref 41.1–50.3)
HCT VFR BLD AUTO: 24.9 % (ref 41.1–50.3)
HGB BLD-MCNC: 6.8 G/DL (ref 13.6–17.2)
HGB BLD-MCNC: 7.7 G/DL (ref 13.6–17.2)
HGB BLD-MCNC: 7.8 G/DL (ref 13.6–17.2)
HISTORY CHECKED?,CKHIST: NORMAL
IMM GRANULOCYTES # BLD AUTO: 0.1 K/UL (ref 0–0.5)
IMM GRANULOCYTES NFR BLD AUTO: 1 % (ref 0–5)
LYMPHOCYTES # BLD: 1.9 K/UL (ref 0.5–4.6)
LYMPHOCYTES NFR BLD: 22 % (ref 13–44)
MCH RBC QN AUTO: 28.5 PG (ref 26.1–32.9)
MCHC RBC AUTO-ENTMCNC: 32.4 G/DL (ref 31.4–35)
MCV RBC AUTO: 88 FL (ref 79.6–97.8)
MONOCYTES # BLD: 0.7 K/UL (ref 0.1–1.3)
MONOCYTES NFR BLD: 8 % (ref 4–12)
NEUTS SEG # BLD: 5.5 K/UL (ref 1.7–8.2)
NEUTS SEG NFR BLD: 64 % (ref 43–78)
NRBC # BLD: 0 K/UL (ref 0–0.2)
PLATELET # BLD AUTO: 214 K/UL (ref 150–450)
PMV BLD AUTO: 10.3 FL (ref 9.4–12.3)
POTASSIUM SERPL-SCNC: 4.8 MMOL/L (ref 3.5–5.1)
PROT SERPL-MCNC: 4.8 G/DL (ref 6.3–8.2)
RBC # BLD AUTO: 2.74 M/UL (ref 4.23–5.6)
SODIUM SERPL-SCNC: 144 MMOL/L (ref 136–145)
WBC # BLD AUTO: 8.5 K/UL (ref 4.3–11.1)

## 2021-03-11 PROCEDURE — 74011250636 HC RX REV CODE- 250/636: Performed by: INTERNAL MEDICINE

## 2021-03-11 PROCEDURE — 65270000029 HC RM PRIVATE

## 2021-03-11 PROCEDURE — 76040000025: Performed by: INTERNAL MEDICINE

## 2021-03-11 PROCEDURE — 74011000250 HC RX REV CODE- 250: Performed by: REGISTERED NURSE

## 2021-03-11 PROCEDURE — P9016 RBC LEUKOCYTES REDUCED: HCPCS

## 2021-03-11 PROCEDURE — 36415 COLL VENOUS BLD VENIPUNCTURE: CPT

## 2021-03-11 PROCEDURE — 77030037088 HC TUBE ENDOTRACH ORAL NSL COVD-A: Performed by: ANESTHESIOLOGY

## 2021-03-11 PROCEDURE — 30233N1 TRANSFUSION OF NONAUTOLOGOUS RED BLOOD CELLS INTO PERIPHERAL VEIN, PERCUTANEOUS APPROACH: ICD-10-PCS | Performed by: FAMILY MEDICINE

## 2021-03-11 PROCEDURE — 85018 HEMOGLOBIN: CPT

## 2021-03-11 PROCEDURE — 85025 COMPLETE CBC W/AUTO DIFF WBC: CPT

## 2021-03-11 PROCEDURE — 2709999900 HC NON-CHARGEABLE SUPPLY: Performed by: INTERNAL MEDICINE

## 2021-03-11 PROCEDURE — 74011250636 HC RX REV CODE- 250/636: Performed by: REGISTERED NURSE

## 2021-03-11 PROCEDURE — C9113 INJ PANTOPRAZOLE SODIUM, VIA: HCPCS | Performed by: NURSE PRACTITIONER

## 2021-03-11 PROCEDURE — 77030021593 HC FCPS BIOP ENDOSC BSC -A: Performed by: INTERNAL MEDICINE

## 2021-03-11 PROCEDURE — 74011250636 HC RX REV CODE- 250/636: Performed by: STUDENT IN AN ORGANIZED HEALTH CARE EDUCATION/TRAINING PROGRAM

## 2021-03-11 PROCEDURE — 80053 COMPREHEN METABOLIC PANEL: CPT

## 2021-03-11 PROCEDURE — 74011250636 HC RX REV CODE- 250/636: Performed by: ANESTHESIOLOGY

## 2021-03-11 PROCEDURE — 76060000032 HC ANESTHESIA 0.5 TO 1 HR: Performed by: INTERNAL MEDICINE

## 2021-03-11 PROCEDURE — 74011000250 HC RX REV CODE- 250: Performed by: NURSE PRACTITIONER

## 2021-03-11 PROCEDURE — 74011636637 HC RX REV CODE- 636/637: Performed by: INTERNAL MEDICINE

## 2021-03-11 PROCEDURE — 0DB68ZX EXCISION OF STOMACH, VIA NATURAL OR ARTIFICIAL OPENING ENDOSCOPIC, DIAGNOSTIC: ICD-10-PCS | Performed by: INTERNAL MEDICINE

## 2021-03-11 PROCEDURE — 77030019908 HC STETH ESOPH SIMS -A: Performed by: ANESTHESIOLOGY

## 2021-03-11 PROCEDURE — 74011250637 HC RX REV CODE- 250/637: Performed by: INTERNAL MEDICINE

## 2021-03-11 PROCEDURE — 77030040361 HC SLV COMPR DVT MDII -B: Performed by: INTERNAL MEDICINE

## 2021-03-11 PROCEDURE — 82248 BILIRUBIN DIRECT: CPT

## 2021-03-11 PROCEDURE — 0DB58ZX EXCISION OF ESOPHAGUS, VIA NATURAL OR ARTIFICIAL OPENING ENDOSCOPIC, DIAGNOSTIC: ICD-10-PCS | Performed by: INTERNAL MEDICINE

## 2021-03-11 PROCEDURE — 82962 GLUCOSE BLOOD TEST: CPT

## 2021-03-11 PROCEDURE — 77030039425 HC BLD LARYNG TRULITE DISP TELE -A: Performed by: ANESTHESIOLOGY

## 2021-03-11 PROCEDURE — 88305 TISSUE EXAM BY PATHOLOGIST: CPT

## 2021-03-11 PROCEDURE — 0DB98ZX EXCISION OF DUODENUM, VIA NATURAL OR ARTIFICIAL OPENING ENDOSCOPIC, DIAGNOSTIC: ICD-10-PCS | Performed by: INTERNAL MEDICINE

## 2021-03-11 PROCEDURE — 74011250636 HC RX REV CODE- 250/636: Performed by: NURSE PRACTITIONER

## 2021-03-11 PROCEDURE — 36430 TRANSFUSION BLD/BLD COMPNT: CPT

## 2021-03-11 PROCEDURE — 88312 SPECIAL STAINS GROUP 1: CPT

## 2021-03-11 RX ORDER — LIDOCAINE HYDROCHLORIDE 20 MG/ML
INJECTION, SOLUTION EPIDURAL; INFILTRATION; INTRACAUDAL; PERINEURAL AS NEEDED
Status: DISCONTINUED | OUTPATIENT
Start: 2021-03-11 | End: 2021-03-11 | Stop reason: HOSPADM

## 2021-03-11 RX ORDER — SODIUM CHLORIDE, SODIUM LACTATE, POTASSIUM CHLORIDE, CALCIUM CHLORIDE 600; 310; 30; 20 MG/100ML; MG/100ML; MG/100ML; MG/100ML
100 INJECTION, SOLUTION INTRAVENOUS CONTINUOUS
Status: DISCONTINUED | OUTPATIENT
Start: 2021-03-11 | End: 2021-03-11 | Stop reason: HOSPADM

## 2021-03-11 RX ORDER — ROCURONIUM BROMIDE 10 MG/ML
INJECTION, SOLUTION INTRAVENOUS AS NEEDED
Status: DISCONTINUED | OUTPATIENT
Start: 2021-03-11 | End: 2021-03-11 | Stop reason: HOSPADM

## 2021-03-11 RX ORDER — ONDANSETRON 2 MG/ML
INJECTION INTRAMUSCULAR; INTRAVENOUS AS NEEDED
Status: DISCONTINUED | OUTPATIENT
Start: 2021-03-11 | End: 2021-03-11 | Stop reason: HOSPADM

## 2021-03-11 RX ORDER — SODIUM CHLORIDE 9 MG/ML
250 INJECTION, SOLUTION INTRAVENOUS AS NEEDED
Status: DISCONTINUED | OUTPATIENT
Start: 2021-03-11 | End: 2021-03-13 | Stop reason: HOSPADM

## 2021-03-11 RX ORDER — INSULIN LISPRO 100 [IU]/ML
INJECTION, SOLUTION INTRAVENOUS; SUBCUTANEOUS
Status: DISCONTINUED | OUTPATIENT
Start: 2021-03-11 | End: 2021-03-13 | Stop reason: HOSPADM

## 2021-03-11 RX ORDER — PROPOFOL 10 MG/ML
INJECTION, EMULSION INTRAVENOUS AS NEEDED
Status: DISCONTINUED | OUTPATIENT
Start: 2021-03-11 | End: 2021-03-11 | Stop reason: HOSPADM

## 2021-03-11 RX ORDER — SUCCINYLCHOLINE CHLORIDE 20 MG/ML
INJECTION INTRAMUSCULAR; INTRAVENOUS AS NEEDED
Status: DISCONTINUED | OUTPATIENT
Start: 2021-03-11 | End: 2021-03-11 | Stop reason: HOSPADM

## 2021-03-11 RX ADMIN — INSULIN GLARGINE 10 UNITS: 100 INJECTION, SOLUTION SUBCUTANEOUS at 06:08

## 2021-03-11 RX ADMIN — Medication 10 ML: at 22:00

## 2021-03-11 RX ADMIN — PANTOPRAZOLE SODIUM 40 MG: 40 INJECTION, POWDER, FOR SOLUTION INTRAVENOUS at 21:01

## 2021-03-11 RX ADMIN — SUCCINYLCHOLINE CHLORIDE 140 MG: 20 INJECTION, SOLUTION INTRAMUSCULAR; INTRAVENOUS at 11:19

## 2021-03-11 RX ADMIN — PANCRELIPASE LIPASE, PANCRELIPASE PROTEASE, PANCRELIPASE AMYLASE 1 CAPSULE: 20000; 63000; 84000 CAPSULE, DELAYED RELEASE ORAL at 17:07

## 2021-03-11 RX ADMIN — ONDANSETRON 4 MG: 2 INJECTION INTRAMUSCULAR; INTRAVENOUS at 11:36

## 2021-03-11 RX ADMIN — LIDOCAINE HYDROCHLORIDE 60 MG: 20 INJECTION, SOLUTION EPIDURAL; INFILTRATION; INTRACAUDAL; PERINEURAL at 11:18

## 2021-03-11 RX ADMIN — TAMSULOSIN HYDROCHLORIDE 0.4 MG: 0.4 CAPSULE ORAL at 17:07

## 2021-03-11 RX ADMIN — VITAMIN D, TAB 1000IU (100/BT) 1000 UNITS: 25 TAB at 08:33

## 2021-03-11 RX ADMIN — INSULIN LISPRO 8 UNITS: 100 INJECTION, SOLUTION INTRAVENOUS; SUBCUTANEOUS at 17:07

## 2021-03-11 RX ADMIN — SODIUM CHLORIDE, SODIUM LACTATE, POTASSIUM CHLORIDE, AND CALCIUM CHLORIDE 100 ML/HR: 600; 310; 30; 20 INJECTION, SOLUTION INTRAVENOUS at 10:58

## 2021-03-11 RX ADMIN — FLUTICASONE PROPIONATE 2 SPRAY: 50 SPRAY, METERED NASAL at 08:34

## 2021-03-11 RX ADMIN — METOCLOPRAMIDE HYDROCHLORIDE 5 MG: 5 INJECTION INTRAMUSCULAR; INTRAVENOUS at 08:33

## 2021-03-11 RX ADMIN — Medication 10 ML: at 06:08

## 2021-03-11 RX ADMIN — INSULIN LISPRO 4 UNITS: 100 INJECTION, SOLUTION INTRAVENOUS; SUBCUTANEOUS at 01:07

## 2021-03-11 RX ADMIN — INSULIN GLARGINE 10 UNITS: 100 INJECTION, SOLUTION SUBCUTANEOUS at 21:00

## 2021-03-11 RX ADMIN — PROPOFOL 140 MG: 10 INJECTION, EMULSION INTRAVENOUS at 11:19

## 2021-03-11 RX ADMIN — ATORVASTATIN CALCIUM 20 MG: 10 TABLET, FILM COATED ORAL at 21:01

## 2021-03-11 RX ADMIN — ROCURONIUM BROMIDE 5 MG: 10 INJECTION, SOLUTION INTRAVENOUS at 11:18

## 2021-03-11 RX ADMIN — TAMSULOSIN HYDROCHLORIDE 0.4 MG: 0.4 CAPSULE ORAL at 08:34

## 2021-03-11 RX ADMIN — PANTOPRAZOLE SODIUM 40 MG: 40 INJECTION, POWDER, FOR SOLUTION INTRAVENOUS at 08:33

## 2021-03-11 RX ADMIN — Medication 10 ML: at 17:10

## 2021-03-11 NOTE — PROCEDURES
[facility]  ESOPHAGOGASTRODUODENOSCOPY        DATE of PROCEDURE: 3/11/2021    PT NAME: Loreto Perez  [id number]    PHYSICIAN:  Arsenio Altamirano MD    MEDICATION:  MAC    INSTRUMENT: GIFQ    PROCEDURE:  EGD with biopsies, EGD diagnostic    INDICATIONS: Coffee Ground Emesis, Melena, Transfused Acute Blood Loss Anemia (Repeat EGD from yesterday given there was a lot of old blood in the stomach)     FINDINGS:  OROPHARYNX: Cords and pyriform recesses normal.  ESOPHAGUS: There is LA class B esophagitis in the distal esophagus but I did clearly see an esophageal ulcer which is clean based in the distal esophagus. This was likely the source of the bleeding. Multiple biopsies were taken from the distal esophagus using cold forceps and sent for histology. STOMACH: The fundus on antegrade and retroflex views is normal. The body, antrum and pylorus is normal.  The stomach was completely empty today and stained with bilious fluid. No ulcers or inflammation was seen in the stomach. Multiple biopsies were taken from the antrum, incisura and body of the stomach using cold forceps to rule out H. pylori. DUODENUM: The bulb has moderate erythema concerning for a moderate duodenitis and second portions are normal.  Multiple biopsies were taken from the duodenitis using cold forceps and sent for histology. ASSESSMENT:  1. Duodenitis  2. Esophagitis with Esophageal Ulcer    PLAN:  1. Follow up pathology  2. Treat for H. Pylori if positive  3. Avoid NSAIDs  4. Continue PPI BID  5. Repeat EGD in 8-12 weeks to confirm healing of the esophagitis/ esophageal ulcer  6.  Okay to start clear liquid diet and advance as tolerates    Arsenio Altamirano MD  [facility]

## 2021-03-11 NOTE — PROGRESS NOTES
Notified of critical lab result. Hgb is 6.8. MD notified and ordered 1 unit of blood to be transfused.

## 2021-03-11 NOTE — PERIOP NOTES
TRANSFER - OUT REPORT:    Verbal report given to [address] on Yelena Franklin  being transferred to  for routine post - op       Report consisted of patients Situation, Background, Assessment and   Recommendations(SBAR). Information from the following report(s) OR Summary, Procedure Summary, Intake/Output and MAR was reviewed with the receiving nurse. Lines:   Peripheral IV 03/10/21 Left Antecubital (Active)   Site Assessment Clean, dry, & intact 03/11/21 1150   Phlebitis Assessment 0 03/11/21 1150   Infiltration Assessment 0 03/11/21 1150   Dressing Status Old drainage 03/11/21 1150   Dressing Type Transparent;Tape 03/11/21 1150   Hub Color/Line Status Green;Patent 03/11/21 1150   Alcohol Cap Used No 03/10/21 1700       Peripheral IV 03/10/21 Right Forearm (Active)   Site Assessment Clean, dry, & intact 03/11/21 1150   Phlebitis Assessment 0 03/11/21 1150   Infiltration Assessment 0 03/11/21 1150   Dressing Status Old drainage 03/11/21 1150   Dressing Type Transparent;Tape 03/11/21 1150   Hub Color/Line Status Patent 03/11/21 1150   Alcohol Cap Used No 03/10/21 1700        Opportunity for questions and clarification was provided. Patient transported with:   O2 @ 3 liters    VTE prophylaxis orders have been written for Yelena Franklin.

## 2021-03-11 NOTE — ANESTHESIA PREPROCEDURE EVALUATION
Anesthetic History   No history of anesthetic complications            Review of Systems / Medical History  Patient summary reviewed, nursing notes reviewed and pertinent labs reviewed    Pulmonary        Sleep apnea: No treatment           Neuro/Psych   Within defined limits           Cardiovascular    Hypertension          CAD (stent 2008), CABG/stent, cardiac stents (2003) and hyperlipidemia    Exercise tolerance: <4 METS  Comments: Normal stress test 2/19. GI/Hepatic/Renal     GERD (chronic pancreatitis)    Renal disease: CRI and stones      Comments: Gi bleed, nausea and vomiting, will require ga. Endo/Other    Diabetes: well controlled, type 2, using insulin    Arthritis     Other Findings            Physical Exam    Airway  Mallampati: III  TM Distance: < 4 cm  Neck ROM: normal range of motion   Mouth opening: Diminished (comment)     Cardiovascular  Regular rate and rhythm,  S1 and S2 normal,  no murmur, click, rub, or gallop  Rhythm: regular  Rate: normal         Dental  No notable dental hx       Pulmonary  Breath sounds clear to auscultation               Abdominal         Other Findings            Anesthetic Plan    ASA: 3  Anesthesia type: general          Induction: Intravenous and RSI  Anesthetic plan and risks discussed with: Patient      Uneventful GETA yesterday, significant clot noted in stomach and blocked endoscopic view so bringing back today. No N/V today.

## 2021-03-11 NOTE — ANESTHESIA POSTPROCEDURE EVALUATION
Procedure(s):  ESOPHAGOGASTRODUODENOSCOPY (EGD) ROOM 823  * Add on in fluoro - Needs intubation per Dr. Marcus Baca  *Not on droplet per RN  ESOPHAGOGASTRODUODENAL (EGD) BIOPSY.     general    Anesthesia Post Evaluation        Patient location during evaluation: PACU  Patient participation: complete - patient participated  Level of consciousness: awake  Pain management: satisfactory to patient  Airway patency: patent  Anesthetic complications: no  Cardiovascular status: hemodynamically stable  Respiratory status: spontaneous ventilation  Hydration status: euvolemic  Post anesthesia nausea and vomiting:  none      INITIAL Post-op Vital signs:   Vitals Value Taken Time   /57 03/11/21 1225   Temp 36.7 °C (98 °F) 03/11/21 1220   Pulse 74 03/11/21 1225   Resp 16 03/11/21 1225   SpO2 98 % 03/11/21 1225

## 2021-03-11 NOTE — PROGRESS NOTES
TRANSFER - IN REPORT:    Verbal report received from [address] on Gena Henson  being received from [phone] for ordered procedure      Report consisted of patients Situation, Background, Assessment and   Recommendations(SBAR). Information from the following report(s) SBAR, Intake/Output, MAR, Recent Results, Med Rec Status and Pre Procedure Checklist was reviewed with the receiving nurse. Opportunity for questions and clarification was provided.

## 2021-03-11 NOTE — INTERVAL H&P NOTE
Update History & Physical 
 
The Patient's History and Physical of 3/10/21 was reviewed with the patient and I examined the patient. There was no change. We attempted EGD yesterday but his stomach was full of old blood/ clots which could be not be cleared. Hg this AM 7.8 after transfusion. Patient feels better and denies any further coffee ground emesis overnight. Will plan for repeat EGD. Plan:  The risk, benefits, expected outcome, and alternative to the recommended procedure have been discussed with the patient. Patient understands and wants to proceed with the procedure.  
 
Electronically signed by Stacy Mosquera MD on 3/11/2021 at 10:54 AM

## 2021-03-11 NOTE — PROGRESS NOTES
Lilibeth Lujan in PACU notified of procedure start. 1140: PACU has been called and ready to accept patient. Patient has been assessed and ready to be transported.

## 2021-03-11 NOTE — PROGRESS NOTES
Physician Progress Note      PATIENT:               Diana Carlson  CSN #:                  [id number]  :                       1933  ADMIT DATE:       3/10/2021 9:45 AM  DISCH DATE:  RESPONDING  PROVIDER #:        JOSE CHAVEZ DO          QUERY TEXT:    Pt admitted with GI Bleed and has had a drop in hgb. If possible, please document in progress notes and discharge summary further specificity regarding the acuity and type of anemia:    The medical record reflects the following:  Risk Factors: ASA daily  Clinical Indicators: 3-10 hgb 10.0  6.8  Treatment: 1 unit PRBC's given  Options provided:  -- Anemia due to acute blood loss  -- Anemia due to chronic blood loss  -- Anemia due to acute on chronic blood loss  -- Other - I will add my own diagnosis  -- Disagree - Not applicable / Not valid  -- Disagree - Clinically unable to determine / Unknown  -- Refer to Clinical Documentation Reviewer    PROVIDER RESPONSE TEXT:    This patient has acute blood loss anemia.     Query created by: Robyn Amaya on 3/11/2021 10:29 AM      Electronically signed by:  Juan Antonio Mitchell DO 3/11/2021 4:32 PM

## 2021-03-11 NOTE — ROUTINE PROCESS
Patient arrived to GI lab prep in stable condition. Patient voices no complaints or concerns at this time

## 2021-03-11 NOTE — PROGRESS NOTES
Hospitalist Progress Note    3/11/2021  Admit Date: 3/10/2021  9:45 AM   NAME: Svetlana Gaming   :  1933   MRN:  [id number]   Attending: Ibeth Araujo DO  PCP:  Jaja Epstein MD    SUBJECTIVE:   \"Patient is a 80 y.o. male with medical h/o CKD stage 3, DM2, HTN, HLP, chronic pancreatitis who presented to [facility] ED with complaint of vomiting and melena. Patient recently admitted to [facility] 3/5 - 3/7 r right ureteral stone requiring stent placement. CT at that time also notable for 6cm liver mass. He was scheduled for outpatient abd mri and oncology eval. Also treated for Staph epidermis UTI with bactrim. Pt reports coffee ground emesis began last night several episodes as well as a few episodes of melena. Pt actively having hematemesis in the ER during my interview. He is on ASA at home but does not take other blood thinners or OTC NSAIDs. He admits to some mid epigastric \"discomfort\".      ER workup noatble for hgb 10 (12.7 3/6), Cr 2.6 (was 2.6 on 3/7 and 1.8 on 3/2). ALT//158 (previously wnl). Occult positive. Gi has been consulted by ED with plans for EGD this afternoon [3/10]. \"--copied from H&P. Pt is admitted for acute GIB. He was started on IV PPI BID and his home OAC/ASA were held. He underwent EGD on 3/10 that showed old blood/large blood clots in stomach, Class B esophagitis. He was transfused 1U PRBC overnight 3/11 for Hgb 6.8. Repeat EGD planned for AM 3/11    He was also found to have a Liver mass 6 cm as well as elevated LFTs. GI has ordered AFP which was wnl at 4.9. Hepatitis panel was unremarkable. He was scheduled for outpt abd MRI and oncology eval per pt. This AM pt did not have any acute complaints. No more nausea and has not noticed any melena or hematochezia with BM overnight. Awaiting his EGD this AM. Denies fever, chills, SOB, chest pain, abdominal pain, diarrhea or constipation.       Review of Systems negative with exception of pertinent positives noted above  PHYSICAL EXAM     Visit Vitals  BP (!) 118/59   Pulse 69   Temp 97.7 °F (36.5 °C)   Resp 16   Ht 5' 9\" (1.753 m)   Wt 79.4 kg (175 lb)   SpO2 98%   BMI 25.84 kg/m²      Temp (24hrs), Av °F (36.7 °C), Min:97.3 °F (36.3 °C), Max:98.9 °F (37.2 °C)    Oxygen Therapy  O2 Sat (%): 98 % (21 1155)  Pulse via Oximetry: 69 beats per minute (21 1155)  O2 Device: Nasal cannula (21 1150)  O2 Flow Rate (L/min): 3 l/min (21 1150)    Intake/Output Summary (Last 24 hours) at 3/11/2021 1158  Last data filed at 3/11/2021 1150  Gross per 24 hour   Intake 1400 ml   Output 800 ml   Net 600 ml      General: No acute distress  Lungs:  CTA Bilaterally. Heart:  Regular rate and rhythm,  No murmur, rub, or gallop  Abdomen: Soft, Non distended, Positive bowel sounds. Mild TTP on lower quadrants. No rebound or guarding. Extremities: No cyanosis, clubbing or edema  Neurologic:  No focal deficits    No orders to display         De Comert 96 Problems    Diagnosis Date Noted    GI bleed 03/10/2021    Right ureteral stone 2021    Mixed hyperlipidemia 2020    Essential hypertension with goal blood pressure less than 130/85 2020    Type [facility] mellitus [facility]     Stage 3 chronic kidney disease 2019    CAD (coronary artery disease) 2012    Hyperlipidemia 2012     Plan:    # GIB  - serial h&H  - IV PPI q12  - holding all NSAIDs  - holding all anticoagulants/ asa  - NPO, cont mIVF  - EGD 3/10-old blood/large blood clots in stomach, Class B esophagitis. Try Reglan to empty clot burden  - Transfused 1U PRBC overnight for Hgb 6.8.  Repeat EGD this AM     # Liver mass 6 cm/ elevated LFTs  - note GI has ordered AFP--4.9  - Hepatitis panel unremarkable  - scheduled for outpt abd MRI and oncology eval per pt  - follow LFT's, improved     # Chronic pancreatitis  - continue creon supplement     # DM2  - usually takes Lantus 25 units q12 (will reduce to 10 units q12)  - usually takes novolog 20 units qac - will hold with NPO  - continue SSI     # Recent staph UTI  - was taking bactrim at home (?completed 5 day course)  - hold further atbx and repeat UA     # CKD stage 3  - appears Cr baseline around 1.4-1.8  prior to recent admission, no stable at 2.6  - CT urogram on 3/2 - comments on new 6.4 cm liver mass as well as enlarged prostate and ?chronic outlet obstruction.  - continue IVF  - follow-up repeat UA as above  - monitor strict I/O's and add bladder checks     # right obstructive ureteral stone s/p right ureteral stent placement 3/6  - urology reports plans for stent removal in next fefw weeks  - cont flomax     Disposition: home with hhc vs STR suspected  Diet: NPO  VTE ppx: scd  GI ppx: iv ppi bid  CODE STATUS: DNR as d/w pt and daughter at bedside by primary MD on admission    Signed By: Altagracia Mathur DO     March 11, 2021

## 2021-03-11 NOTE — PROGRESS NOTES
Beside shift report received from [facility]  Patient lying in bed  Respirations even and unlabored on 2L NC  No signs of distress  No needs expressed at this time  Safety measures in place

## 2021-03-11 NOTE — PROGRESS NOTES
Care Management Interventions  PCP Verified by CM: Yes  Physical Therapy Consult: No  Occupational Therapy Consult: No  Current Support Network: Lives with Spouse  Confirm Follow Up Transport: Family  Freedom of Choice List was Provided with Basic Dialogue that Supports the Patient's Individualized Plan of Care/Goals, Treatment Preferences and Shares the Quality Data Associated with the Providers?: Yes  [facility] Resource Information Provided?: No  Discharge Location  Discharge Placement: Unable to determine at this time  CM called patient's wife to discuss discharge plans. Patient is independent with ambulation and ADls. Patient assists wife with ADLs. No history of HH or rehab. DME: none  Confirmed pcp. Drives  Patient has a supportive family with 3 daughters that live throughout [place]. CM following for discharge needs.

## 2021-03-11 NOTE — PROGRESS NOTES
TRANSFER - IN REPORT:    Verbal report received from RN on Carlos Guevara  being received from GI lab for routine progression of care      Report consisted of patients Situation, Background, Assessment and   Recommendations(SBAR). Information from the following report(s) Procedure Summary was reviewed with the receiving nurse. Opportunity for questions and clarification was provided. Assessment completed upon patients arrival to unit and care assumed.

## 2021-03-12 LAB
ALBUMIN SERPL-MCNC: 2 G/DL (ref 3.2–4.6)
ALBUMIN/GLOB SERPL: 0.8 {RATIO} (ref 1.2–3.5)
ALP SERPL-CCNC: 132 U/L (ref 50–136)
ALT SERPL-CCNC: 37 U/L (ref 12–65)
APPEARANCE UR: CLEAR
AST SERPL-CCNC: 33 U/L (ref 15–37)
BACTERIA URNS QL MICRO: 0 /HPF
BILIRUB DIRECT SERPL-MCNC: <0.1 MG/DL
BILIRUB SERPL-MCNC: 0.3 MG/DL (ref 0.2–1.1)
BILIRUB UR QL: NEGATIVE
CASTS URNS QL MICRO: 0 /LPF
COLOR UR: YELLOW
EPI CELLS #/AREA URNS HPF: ABNORMAL /HPF
GLOBULIN SER CALC-MCNC: 2.6 G/DL (ref 2.3–3.5)
GLUCOSE BLD STRIP.AUTO-MCNC: 106 MG/DL (ref 65–100)
GLUCOSE BLD STRIP.AUTO-MCNC: 149 MG/DL (ref 65–100)
GLUCOSE BLD STRIP.AUTO-MCNC: 199 MG/DL (ref 65–100)
GLUCOSE BLD STRIP.AUTO-MCNC: 228 MG/DL (ref 65–100)
GLUCOSE UR STRIP.AUTO-MCNC: 100 MG/DL
HCT VFR BLD AUTO: 21.5 % (ref 41.1–50.3)
HCT VFR BLD AUTO: 21.9 % (ref 41.1–50.3)
HCT VFR BLD AUTO: 22 % (ref 41.1–50.3)
HCT VFR BLD AUTO: 30.9 % (ref 41.1–50.3)
HGB BLD-MCNC: 6.7 G/DL (ref 13.6–17.2)
HGB BLD-MCNC: 6.9 G/DL (ref 13.6–17.2)
HGB BLD-MCNC: 7 G/DL (ref 13.6–17.2)
HGB BLD-MCNC: 9.7 G/DL (ref 13.6–17.2)
HGB UR QL STRIP: ABNORMAL
HISTORY CHECKED?,CKHIST: NORMAL
KETONES UR QL STRIP.AUTO: NEGATIVE MG/DL
LEUKOCYTE ESTERASE UR QL STRIP.AUTO: ABNORMAL
NITRITE UR QL STRIP.AUTO: NEGATIVE
PH UR STRIP: 6 [PH] (ref 5–9)
PROT SERPL-MCNC: 4.6 G/DL (ref 6.3–8.2)
PROT UR STRIP-MCNC: ABNORMAL MG/DL
RBC #/AREA URNS HPF: >100 /HPF
SP GR UR REFRACTOMETRY: 1.01 (ref 1–1.02)
UROBILINOGEN UR QL STRIP.AUTO: 0.2 EU/DL (ref 0.2–1)
WBC URNS QL MICRO: ABNORMAL /HPF

## 2021-03-12 PROCEDURE — 85014 HEMATOCRIT: CPT

## 2021-03-12 PROCEDURE — 74011250637 HC RX REV CODE- 250/637: Performed by: INTERNAL MEDICINE

## 2021-03-12 PROCEDURE — 82962 GLUCOSE BLOOD TEST: CPT

## 2021-03-12 PROCEDURE — 2709999900 HC NON-CHARGEABLE SUPPLY

## 2021-03-12 PROCEDURE — C9113 INJ PANTOPRAZOLE SODIUM, VIA: HCPCS | Performed by: NURSE PRACTITIONER

## 2021-03-12 PROCEDURE — 87086 URINE CULTURE/COLONY COUNT: CPT

## 2021-03-12 PROCEDURE — 81001 URINALYSIS AUTO W/SCOPE: CPT

## 2021-03-12 PROCEDURE — 97535 SELF CARE MNGMENT TRAINING: CPT

## 2021-03-12 PROCEDURE — 36430 TRANSFUSION BLD/BLD COMPNT: CPT

## 2021-03-12 PROCEDURE — P9016 RBC LEUKOCYTES REDUCED: HCPCS

## 2021-03-12 PROCEDURE — 74011636637 HC RX REV CODE- 636/637: Performed by: INTERNAL MEDICINE

## 2021-03-12 PROCEDURE — 80076 HEPATIC FUNCTION PANEL: CPT

## 2021-03-12 PROCEDURE — 97165 OT EVAL LOW COMPLEX 30 MIN: CPT

## 2021-03-12 PROCEDURE — 36415 COLL VENOUS BLD VENIPUNCTURE: CPT

## 2021-03-12 PROCEDURE — 74011250636 HC RX REV CODE- 250/636: Performed by: FAMILY MEDICINE

## 2021-03-12 PROCEDURE — 74011000250 HC RX REV CODE- 250: Performed by: NURSE PRACTITIONER

## 2021-03-12 PROCEDURE — 77010033678 HC OXYGEN DAILY

## 2021-03-12 PROCEDURE — 74011250636 HC RX REV CODE- 250/636: Performed by: NURSE PRACTITIONER

## 2021-03-12 PROCEDURE — 94760 N-INVAS EAR/PLS OXIMETRY 1: CPT

## 2021-03-12 PROCEDURE — 65270000029 HC RM PRIVATE

## 2021-03-12 RX ORDER — SUCRALFATE 1 G/1
1 TABLET ORAL
Status: DISCONTINUED | OUTPATIENT
Start: 2021-03-12 | End: 2021-03-13 | Stop reason: HOSPADM

## 2021-03-12 RX ORDER — PANCRELIPASE 24000; 76000; 120000 [USP'U]/1; [USP'U]/1; [USP'U]/1
2 CAPSULE, DELAYED RELEASE PELLETS ORAL
COMMUNITY

## 2021-03-12 RX ORDER — SODIUM CHLORIDE 9 MG/ML
250 INJECTION, SOLUTION INTRAVENOUS AS NEEDED
Status: DISCONTINUED | OUTPATIENT
Start: 2021-03-12 | End: 2021-03-13 | Stop reason: HOSPADM

## 2021-03-12 RX ADMIN — TAMSULOSIN HYDROCHLORIDE 0.4 MG: 0.4 CAPSULE ORAL at 08:59

## 2021-03-12 RX ADMIN — PANCRELIPASE LIPASE, PANCRELIPASE PROTEASE, PANCRELIPASE AMYLASE 1 CAPSULE: 20000; 63000; 84000 CAPSULE, DELAYED RELEASE ORAL at 16:28

## 2021-03-12 RX ADMIN — INSULIN GLARGINE 10 UNITS: 100 INJECTION, SOLUTION SUBCUTANEOUS at 21:10

## 2021-03-12 RX ADMIN — ACETAMINOPHEN 650 MG: 325 TABLET, FILM COATED ORAL at 02:41

## 2021-03-12 RX ADMIN — INSULIN LISPRO 2 UNITS: 100 INJECTION, SOLUTION INTRAVENOUS; SUBCUTANEOUS at 16:32

## 2021-03-12 RX ADMIN — FLUTICASONE PROPIONATE 2 SPRAY: 50 SPRAY, METERED NASAL at 09:00

## 2021-03-12 RX ADMIN — SUCRALFATE 1 G: 1 TABLET ORAL at 21:09

## 2021-03-12 RX ADMIN — ATORVASTATIN CALCIUM 20 MG: 10 TABLET, FILM COATED ORAL at 21:09

## 2021-03-12 RX ADMIN — SODIUM CHLORIDE 250 ML: 900 INJECTION, SOLUTION INTRAVENOUS at 10:31

## 2021-03-12 RX ADMIN — Medication 10 ML: at 06:06

## 2021-03-12 RX ADMIN — PANCRELIPASE LIPASE, PANCRELIPASE PROTEASE, PANCRELIPASE AMYLASE 1 CAPSULE: 20000; 63000; 84000 CAPSULE, DELAYED RELEASE ORAL at 12:35

## 2021-03-12 RX ADMIN — PANTOPRAZOLE SODIUM 40 MG: 40 INJECTION, POWDER, FOR SOLUTION INTRAVENOUS at 09:00

## 2021-03-12 RX ADMIN — TAMSULOSIN HYDROCHLORIDE 0.4 MG: 0.4 CAPSULE ORAL at 17:09

## 2021-03-12 RX ADMIN — INSULIN LISPRO 4 UNITS: 100 INJECTION, SOLUTION INTRAVENOUS; SUBCUTANEOUS at 21:10

## 2021-03-12 RX ADMIN — SUCRALFATE 1 G: 1 TABLET ORAL at 16:27

## 2021-03-12 RX ADMIN — INSULIN GLARGINE 10 UNITS: 100 INJECTION, SOLUTION SUBCUTANEOUS at 06:10

## 2021-03-12 RX ADMIN — Medication 5 ML: at 13:11

## 2021-03-12 RX ADMIN — VITAMIN D, TAB 1000IU (100/BT) 1000 UNITS: 25 TAB at 09:00

## 2021-03-12 RX ADMIN — PANCRELIPASE LIPASE, PANCRELIPASE PROTEASE, PANCRELIPASE AMYLASE 1 CAPSULE: 20000; 63000; 84000 CAPSULE, DELAYED RELEASE ORAL at 08:59

## 2021-03-12 RX ADMIN — PANTOPRAZOLE SODIUM 40 MG: 40 INJECTION, POWDER, FOR SOLUTION INTRAVENOUS at 21:10

## 2021-03-12 RX ADMIN — Medication 10 ML: at 22:00

## 2021-03-12 NOTE — PROGRESS NOTES
Report received from [facility]. Patient resting quietly in bed. Respirations present, even and unlabored on 3L NC. Bed low and locked, safety measures in place. No signs of distress, no needs expressed. Call light within reach, encouraged patient to call with any needs. Will continue to monitor.

## 2021-03-12 NOTE — PROGRESS NOTES
Hospitalist Progress Note    3/12/2021  Admit Date: 3/10/2021  9:45 AM   NAME: Pavel Yu   :  1933   MRN:  [id number]   Attending: Jay Dueñas DO  PCP:  Clint Mendez MD    SUBJECTIVE:   \"Patient is a 80 y.o. male with medical h/o CKD stage 3, DM2, HTN, HLP, chronic pancreatitis who presented to [facility] ED with complaint of vomiting and melena. Patient recently admitted to [facility] 3/5 - 3/7 r right ureteral stone requiring stent placement. CT at that time also notable for 6cm liver mass. He was scheduled for outpatient abd mri and oncology eval. Also treated for Staph epidermis UTI with bactrim. Pt reports coffee ground emesis began last night several episodes as well as a few episodes of melena. Pt actively having hematemesis in the ER. He is on ASA at home but does not take other blood thinners or OTC NSAIDs. He admits to some mid epigastric \"discomfort\".      ER workup noatble for hgb 10 (12.7 3/6), Cr 2.6 (was 2.6 on 3/7 and 1.8 on 3/2). ALT//158 (previously wnl). Occult positive. Gi has been consulted by ED with plans for EGD this afternoon [3/10]. \"--copied from H&P. Pt is admitted for acute GIB. He was started on IV PPI BID and his home OAC/ASA were held. He underwent EGD on 3/10 that showed old blood/large blood clots in stomach, Class B esophagitis. He was transfused 1U PRBC overnight 3/11 for Hgb 6.8. Repeated EGD on 3/11 that shows duodenitis as well as LA class B esophagitis in the distal esophagus with esophageal ulcer which was most likely the source of bleeding. Biopsy pathologies obtained and pt was recommended to avoid NSAIDS, cont PPI BID, clear liquid diet and advance as tolerated, repeat EGD in 8-12 weeks to confirm healing. He was also found to have a Liver mass 6 cm as well as elevated LFTs. GI has ordered AFP which was wnl at 4.9. Hepatitis panel was unremarkable. He was scheduled for outpt abd MRI and oncology eval per pt.  Pt would like to get this worked up as outpt. Overnight was noted to have hemoglobin 6.7 for which 2UPRBC's were ordered. Pt reported 1 episode of melena overnight. No N/V. Anxious about his TURP procedure scheduled on Tuesday and afraid to have to cancel it. Denies fever, chills, SOB, chest pain, abdominal pain, diarrhea or constipation. Review of Systems negative with exception of pertinent positives noted above  PHYSICAL EXAM     Visit Vitals  /67 (BP 1 Location: Left upper arm, BP Patient Position: At rest)   Pulse 63   Temp 97.7 °F (36.5 °C)   Resp 20   Ht 5' 9\" (1.753 m)   Wt 79.4 kg (175 lb)   SpO2 97%   BMI 25.84 kg/m²      Temp (24hrs), Av.8 °F (36.6 °C), Min:97.3 °F (36.3 °C), Max:98.5 °F (36.9 °C)    Oxygen Therapy  O2 Sat (%): 97 % (21 0707)  Pulse via Oximetry: 74 beats per minute (21 1225)  O2 Device: Nasal cannula (21 0740)  Skin Assessment: Clean, dry, & intact (21 0353)  Skin Protection for O2 Device: No (21 0353)  O2 Flow Rate (L/min): 3 l/min (21 0740)    Intake/Output Summary (Last 24 hours) at 3/12/2021 1231  Last data filed at 3/12/2021 1021  Gross per 24 hour   Intake 560.5 ml   Output 1700 ml   Net -1139.5 ml      General: No acute distress  Lungs:  CTA Bilaterally. Heart:  Regular rate and rhythm,  No murmur, rub, or gallop  Abdomen: Soft, Non distended, Positive bowel sounds. Mild TTP on lower quadrants. No rebound or guarding.   Extremities: No cyanosis, clubbing or edema  Neurologic:  No focal deficits    No orders to display         De Comert 96 Problems    Diagnosis Date Noted    GI bleed 03/10/2021    Right ureteral stone 2021    Mixed hyperlipidemia 2020    Essential hypertension with goal blood pressure less than 130/85 2020    Type 2 diabetes mellitus without complication ([address]     Stage 3 chronic kidney disease 2019    CAD (coronary artery disease) 2012    Hyperlipidemia 2012     Plan:    # GIB  - serial h&H  - IV PPI q12  - holding all NSAIDs  - holding all anticoagulants/ asa  - Transfused 1U PRBC 3/10 for Hgb 6.8  - EGD 3/10-old blood/large blood clots in stomach, Class B esophagitis. Try Reglan to empty clot burden  -Repeated EGD on 3/11 showing duodenitis as well as LA class B esophagitis in the distal esophagus with esophageal ulcer which was most likely the source of bleeding. Biopsy pathologies obtained and pt was recommended to avoid NSAIDS, cont PPI BID, clear liquid diet and advance as tolerated, repeat EGD in 8-12 weeks to confirm healing.  -Hgb 6.7 this AM> 2U PRBC's ordered per night team. Calvin Begin H&H post transfusions.    -Diet advance to GI soft. Will decrease mIVF to 50 mL/h to decrease risk of fluid overload with blood transfusions. # Liver mass 6 cm/ elevated LFTs  - note GI has ordered AFP--4.9  - Hepatitis panel unremarkable  - scheduled for outpt abd MRI and oncology eval per pt--pt would like to keep workup as outpt  - follow LFT's, improved     # Chronic pancreatitis  - continue creon supplement     # DM2  - usually takes Lantus 25 units q12 (will reduce to 10 units q12)  - usually takes aspart 15units qac - will hold for now  - continue SSI     # Recent staph UTI  - was taking bactrim at home (?completed 5 day course)  - hold further atbx and repeat UA     # CKD stage 3  - appears Cr baseline around 1.4-1.8  prior to recent admission  - CT urogram on 3/2 - comments on new 6.4 cm liver mass as well as enlarged prostate and ?chronic outlet obstruction.   - continue IVF at lower rate  - follow-up repeat UA as above  - monitor strict I/O's and add bladder checks  - Improved to 2.2 this AM     # right obstructive ureteral stone s/p right ureteral stent placement 3/6  - urology reports plans for R ureteroscopy, laser litho, stent exchange on 3/16  - cont flomax     Disposition: home with hhc vs STR suspected  Diet: GI soft  VTE ppx: scd  GI ppx: iv ppi bid  CODE STATUS: DNR as d/w pt and daughter at bedside by primary MD on admission    Signed By: Eun Velarde DO     March 12, 2021

## 2021-03-12 NOTE — PROGRESS NOTES
Physical Therapy Note:    PT orders received and chart reviewed. Pt Hgb is critically low at 6.9 and BP is measuring hypotensive. Pt is receiving blood this morning to assist with this. PT will hold at this time and attempt at a later time as schedule allows and pt status improves.      Thank you,  Jerry Sauceda PT, DPT

## 2021-03-12 NOTE — PROGRESS NOTES
This RN noted the patient's low BP of 97/43 with a MAP of 61, trending down from the previous BP of 105/53 with a MAP of 70. MD made aware of the above via [employer]. New orders received to put in a Stat Hgb. Will continue to monitor.

## 2021-03-12 NOTE — PROGRESS NOTES
New orders received from Dr. Natalie Anne via [employer] to transfuse 2units of RBC. Lab aware of units ordered. Patient aware of low Hgb and the transfusion orders. Will continue to monitor.

## 2021-03-12 NOTE — PROGRESS NOTES
Patient with moderate size maroon colored bowel movement. I did not flush toilet, but sample is bottom of commode. 2nd unit blood completed at 1 pm. HGB scheduled for 2 pm. MD updated.

## 2021-03-12 NOTE — PROGRESS NOTES
ACUTE OT GOALS:  (Developed with and agreed upon by patient and/or caregiver.)  1. Pt will toilet independently   2. Pt will complete functional mobility for ADLs independently   3. Pt will complete grooming and hygiene at sink independently  5. Pt will demonstrate independence with HEP to promote increased BUE strength and functional use for ADLs  6. Pt will tolerate 23 minutes functional activity with min or fewer rest breaks to promote increased endurance for ADLs  7.  Pt will independently demonstrate/ verbalize 2+ energy conservation techniques to promote increased endurance for ADLs      Timeframe: 7 days      OCCUPATIONAL THERAPY ASSESSMENT: Initial Assessment and Daily Note OT Treatment Day # 1    Yuriy Quezada is a 80 y.o. male   PRIMARY DIAGNOSIS: GI bleed  GI bleed [K92.2]  Procedure(s) (LRB):  ESOPHAGOGASTRODUODENOSCOPY (EGD) ROOM 823  * Add on in fluoro - Needs intubation per Dr. Kalin Valenzuela  *Not on droplet per RN (N/A)  ESOPHAGOGASTRODUODENAL (EGD) BIOPSY (N/A)  1 Day Post-Op  Reason for Referral:  Generalized weakness, GI bleed w/ anemia  ICD-10: Treatment Diagnosis: Generalized Muscle Weakness (M62.81)  [place]PATIENT[place] / [place] / Product Type: Medicare /   ASSESSMENT:     REHAB RECOMMENDATIONS:   Recommendation to date pending progress:  Setting:   No further skilled therapy   Equipment:    None     PRIOR LEVEL OF FUNCTION:  (Prior to Hospitalization)  INITIAL/CURRENT LEVEL OF FUNCTION:  (Based on today's evaluation)   Bathing:   Independent  Dressing:   Independent  Feeding/Grooming:   Independent  Toileting:   Independent  Functional Mobility:   Independent Bathing:   Contact Guard Assistance  Dressing:   Contact Guard Assistance  Feeding/Grooming:   Contact Guard Assistance  Toileting:   Contact Guard Assistance  Functional Mobility:   Contact Guard Assistance     ASSESSMENT:  Mr. May Arthur presented generally weak with deficit in endurance, strength, and balance impacting ADLs. Pt was hypotensive with low H&H in AM, reports feeling much better after getting 2 units of blood. Pt demonstrated ADLs and mobility for ADLs w/o an AD with SBA-CGA overall, slightly unsteady and reported feeling significantly weaker than usual. Pt is below his functional baseline and would benefit from skilled OT services to address deficits. SUBJECTIVE:   Mr. Khadar Sanches states, \"I'm weaker than usual.\"    SOCIAL HISTORY/LIVING ENVIRONMENT: Lives with spouse. 1L home with WIS with [place]. Raised toilet. Fully independent.  No AD, no falls  Home Environment: Private residence([facility])  # Steps to Enter: 0  One/Two Story Residence: One story  Living Alone: No  Support Systems: Family member(s)    OBJECTIVE:     PAIN: VITAL SIGNS: LINES/DRAINS:   Pre Treatment: Pain Screen  Pain Scale 1: Numeric (0 - 10)  Pain Intensity 1: 0  Post Treatment: 0   IV  O2 Device: Nasal cannula     GROSS EVALUATION:  BUE Within Functional Limits Abnormal/ Functional Abnormal/ Non-Functional (see comments) Not Tested Comments:   AROM [x] [] [] []    PROM [] [] [] []    Strength [] [x] [] []    Balance [] [x] [] []    Posture [] [] [] []    Sensation [] [] [] []    Coordination [x] [] [] []    Tone [] [] [] []    Edema [] [] [] []    Activity Tolerance [] [x] [] []     [] [] [] []      COGNITION/  PERCEPTION: Intact Impaired   (see comments) Comments:   Orientation [x] []    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   Hearing [] [x]    Judgment/ Insight [x] []    Attention [x] []    Memory [x] []    Command Following [x] []    Emotional Regulation [x] []     [] []      ACTIVITIES OF DAILY LIVING: I Mod I S SBA CGA Min Mod Max Total NT Comments   BASIC ADLs:              Bathing/ Showering [] [] [] [] [] [] [] [] [] []    Toileting [] [] [] [] [] [] [] [] [] []    Dressing [] [] [] [] [] [] [] [] [] []    Feeding [] [] [] [] [] [] [] [] [] []    Grooming [] [] [] [] [x] [] [] [] [] []    Personal Device Care [] [] [] [] [] [] [] [] [] []    Functional Mobility [] [] [] [] [x] [] [] [] [] []    I=Independent, Mod I=Modified Independent, S=Supervision, SBA=Standby Assistance, CGA=Contact Guard Assistance,   Min=Minimal Assistance, Mod=Moderate Assistance, Max=Maximal Assistance, Total=Total Assistance, NT=Not Tested    MOBILITY: I Mod I S SBA CGA Min Mod Max Total  NT x2 Comments:   Supine to sit [] [] [] [] [] [] [] [] [] [] []    Sit to supine [] [] [] [] [] [] [] [] [] [] []    Sit to stand [] [] [] [] [x] [] [] [] [] [] []    Bed to chair [] [] [] [] [x] [] [] [] [] [] []    I=Independent, Mod I=Modified Independent, S=Supervision, SBA=Standby Assistance, CGA=Contact Guard Assistance,   Min=Minimal Assistance, Mod=Moderate Assistance, Max=Maximal Assistance, Total=Total Assistance, NT=Not [facility] 6 Clicks   Daily Activity Inpatient Short Form        How much help from another person does the patient currently need. .. Total A Lot A Little None   1. Putting on and taking off regular lower body clothing? [] 1   [] 2   [x] 3   [] 4   2. Bathing (including washing, rinsing, drying)? [] 1   [] 2   [x] 3   [] 4   3. Toileting, which includes using toilet, bedpan or urinal?   [] 1   [] 2   [x] 3   [] 4   4. Putting on and taking off regular upper body clothing? [] 1   [] 2   [x] 3   [] 4   5. Taking care of personal grooming such as brushing teeth? [] 1   [] 2   [x] 3   [] 4   6. Eating meals? [] 1   [] 2   [] 3   [x] 4   © 2007, Trustees of [address] [place] [address] [address] [id number], under license to [employer]. All rights reserved     Score:  Initial: 19 Most Recent: X (Date: -- )   Interpretation of Tool:  Represents activities that are increasingly more difficult (i.e. Bed mobility, Transfers, Gait).     PLAN:   FREQUENCY/DURATION: OT Plan of Care: 3 times/week for duration of hospital stay or until stated goals are met, whichever comes first.    PROBLEM LIST:   (Skilled intervention is medically necessary to address:)  1. Decreased ADL/Functional Activities  2. Decreased Activity Tolerance  3. Decreased Balance  4. Decreased Strength   INTERVENTIONS PLANNED:   (Benefits and precautions of occupational therapy have been discussed with the patient.)  1. Self Care Training  2. Therapeutic Activity  3. Therapeutic Exercise/HEP  4. Neuromuscular Re-education  5. Education     TREATMENT:     EVALUATION: Low Complexity : (Untimed Charge)    TREATMENT:   ($$ Self Care/Home Management: 8-22 mins    )  Self Care (10 Minutes): Self care including Grooming to increase independence and decrease level of assistance required.         AFTER TREATMENT POSITION/PRECAUTIONS:  Alarm Activated, Chair, Needs within reach, RN notified and Visitors at bedside    INTERDISCIPLINARY COLLABORATION:  RN/PCT    TOTAL TREATMENT DURATION:  OT Patient Time In/Time Out  Time In: 1450  Time Out: [address] [facility]

## 2021-03-12 NOTE — PROGRESS NOTES
Report given to [facility]. Patient resting quietly in bed. Respirations present, even and unlabored on 3L NC. Patient currently has a RBC blood transfusion infusing at 150mL/hr. No signs or symptoms of reaction. Bed low and locked, safety measures in place. Call light within reach, encouraged patient to call with any needs.

## 2021-03-12 NOTE — PROGRESS NOTES
OT orders received, chart reviewed, and evaluation attempted. Pt hypotensive with critically low Hgb 6.9. Will hold and follow up as schedule/ pt's status allows.     Lyric Awad, OT

## 2021-03-12 NOTE — PROGRESS NOTES
This RN noted that the patient's hemoglobin came back at 7.0, trending down from 7.7. MD made aware via [employer]. This RN was told to monitor for any episodes of bleeding, and if there are, there is to be a STAT H/H drawn on the patient. New H/H to be drawn next due time (ordered for every 8 hours). This RN will continue to monitor for any episodes of bleeding.

## 2021-03-13 VITALS
OXYGEN SATURATION: 96 % | DIASTOLIC BLOOD PRESSURE: 48 MMHG | HEART RATE: 61 BPM | BODY MASS INDEX: 26.07 KG/M2 | SYSTOLIC BLOOD PRESSURE: 101 MMHG | RESPIRATION RATE: 18 BRPM | HEIGHT: 69 IN | WEIGHT: 176 LBS | TEMPERATURE: 98.1 F

## 2021-03-13 LAB
ABO + RH BLD: NORMAL
ALBUMIN SERPL-MCNC: 2.2 G/DL (ref 3.2–4.6)
ALBUMIN/GLOB SERPL: 0.7 {RATIO} (ref 1.2–3.5)
ALP SERPL-CCNC: 127 U/L (ref 50–136)
ALT SERPL-CCNC: 33 U/L (ref 12–65)
ANION GAP SERPL CALC-SCNC: 4 MMOL/L (ref 7–16)
AST SERPL-CCNC: 24 U/L (ref 15–37)
BILIRUB DIRECT SERPL-MCNC: <0.1 MG/DL
BILIRUB SERPL-MCNC: 0.4 MG/DL (ref 0.2–1.1)
BLD PROD TYP BPU: NORMAL
BLOOD GROUP ANTIBODIES SERPL: NORMAL
BPU ID: NORMAL
BUN SERPL-MCNC: 29 MG/DL (ref 8–23)
CALCIUM SERPL-MCNC: 8.5 MG/DL (ref 8.3–10.4)
CHLORIDE SERPL-SCNC: 114 MMOL/L (ref 98–107)
CO2 SERPL-SCNC: 23 MMOL/L (ref 21–32)
CREAT SERPL-MCNC: 1.75 MG/DL (ref 0.8–1.5)
CROSSMATCH RESULT,%XM: NORMAL
ERYTHROCYTE [DISTWIDTH] IN BLOOD BY AUTOMATED COUNT: 15.4 % (ref 11.9–14.6)
GLOBULIN SER CALC-MCNC: 3 G/DL (ref 2.3–3.5)
GLUCOSE BLD STRIP.AUTO-MCNC: 106 MG/DL (ref 65–100)
GLUCOSE BLD STRIP.AUTO-MCNC: 209 MG/DL (ref 65–100)
GLUCOSE SERPL-MCNC: 101 MG/DL (ref 65–100)
HCT VFR BLD AUTO: 28 % (ref 41.1–50.3)
HGB BLD-MCNC: 9.1 G/DL (ref 13.6–17.2)
INR PPP: 1.1
MCH RBC QN AUTO: 28.2 PG (ref 26.1–32.9)
MCHC RBC AUTO-ENTMCNC: 32.5 G/DL (ref 31.4–35)
MCV RBC AUTO: 86.7 FL (ref 79.6–97.8)
NRBC # BLD: 0 K/UL (ref 0–0.2)
PLATELET # BLD AUTO: 255 K/UL (ref 150–450)
PMV BLD AUTO: 10.1 FL (ref 9.4–12.3)
POTASSIUM SERPL-SCNC: 4.6 MMOL/L (ref 3.5–5.1)
PROT SERPL-MCNC: 5.2 G/DL (ref 6.3–8.2)
PROTHROMBIN TIME: 14.1 SEC (ref 12.5–14.7)
RBC # BLD AUTO: 3.23 M/UL (ref 4.23–5.6)
SODIUM SERPL-SCNC: 141 MMOL/L (ref 136–145)
SPECIMEN EXP DATE BLD: NORMAL
STATUS OF UNIT,%ST: NORMAL
UNIT DIVISION, %UDIV: 0
WBC # BLD AUTO: 7.7 K/UL (ref 4.3–11.1)

## 2021-03-13 PROCEDURE — 80076 HEPATIC FUNCTION PANEL: CPT

## 2021-03-13 PROCEDURE — 74011000250 HC RX REV CODE- 250: Performed by: NURSE PRACTITIONER

## 2021-03-13 PROCEDURE — 36415 COLL VENOUS BLD VENIPUNCTURE: CPT

## 2021-03-13 PROCEDURE — C9113 INJ PANTOPRAZOLE SODIUM, VIA: HCPCS | Performed by: NURSE PRACTITIONER

## 2021-03-13 PROCEDURE — 80048 BASIC METABOLIC PNL TOTAL CA: CPT

## 2021-03-13 PROCEDURE — 74011636637 HC RX REV CODE- 636/637: Performed by: INTERNAL MEDICINE

## 2021-03-13 PROCEDURE — 85027 COMPLETE CBC AUTOMATED: CPT

## 2021-03-13 PROCEDURE — 85610 PROTHROMBIN TIME: CPT

## 2021-03-13 PROCEDURE — 82962 GLUCOSE BLOOD TEST: CPT

## 2021-03-13 PROCEDURE — 74011250637 HC RX REV CODE- 250/637: Performed by: INTERNAL MEDICINE

## 2021-03-13 PROCEDURE — 74011250636 HC RX REV CODE- 250/636: Performed by: FAMILY MEDICINE

## 2021-03-13 PROCEDURE — 74011250636 HC RX REV CODE- 250/636: Performed by: NURSE PRACTITIONER

## 2021-03-13 RX ORDER — PANTOPRAZOLE SODIUM 40 MG/1
40 TABLET, DELAYED RELEASE ORAL 2 TIMES DAILY
Qty: 60 TAB | Refills: 1 | Status: SHIPPED | OUTPATIENT
Start: 2021-03-13

## 2021-03-13 RX ORDER — SUCRALFATE 1 G/1
1 TABLET ORAL
Qty: 120 TAB | Refills: 1 | Status: SHIPPED | OUTPATIENT
Start: 2021-03-13 | End: 2021-04-13

## 2021-03-13 RX ADMIN — TAMSULOSIN HYDROCHLORIDE 0.4 MG: 0.4 CAPSULE ORAL at 09:10

## 2021-03-13 RX ADMIN — SUCRALFATE 1 G: 1 TABLET ORAL at 11:12

## 2021-03-13 RX ADMIN — PANTOPRAZOLE SODIUM 40 MG: 40 INJECTION, POWDER, FOR SOLUTION INTRAVENOUS at 09:11

## 2021-03-13 RX ADMIN — PANCRELIPASE LIPASE, PANCRELIPASE PROTEASE, PANCRELIPASE AMYLASE 1 CAPSULE: 20000; 63000; 84000 CAPSULE, DELAYED RELEASE ORAL at 09:11

## 2021-03-13 RX ADMIN — Medication 10 ML: at 06:00

## 2021-03-13 RX ADMIN — VITAMIN D, TAB 1000IU (100/BT) 1000 UNITS: 25 TAB at 09:10

## 2021-03-13 RX ADMIN — SUCRALFATE 1 G: 1 TABLET ORAL at 06:30

## 2021-03-13 RX ADMIN — SODIUM CHLORIDE 50 ML/HR: 900 INJECTION, SOLUTION INTRAVENOUS at 01:13

## 2021-03-13 RX ADMIN — FLUTICASONE PROPIONATE 2 SPRAY: 50 SPRAY, METERED NASAL at 09:10

## 2021-03-13 RX ADMIN — INSULIN GLARGINE 10 UNITS: 100 INJECTION, SOLUTION SUBCUTANEOUS at 06:15

## 2021-03-13 RX ADMIN — INSULIN LISPRO 4 UNITS: 100 INJECTION, SOLUTION INTRAVENOUS; SUBCUTANEOUS at 11:12

## 2021-03-13 RX ADMIN — PANCRELIPASE LIPASE, PANCRELIPASE PROTEASE, PANCRELIPASE AMYLASE 1 CAPSULE: 20000; 63000; 84000 CAPSULE, DELAYED RELEASE ORAL at 11:12

## 2021-03-13 RX ADMIN — Medication 10 ML: at 12:19

## 2021-03-13 NOTE — PROGRESS NOTES
Report received from [facility]. Respirations present, even and unlabored on room air. Bed low and locked, safety measures in place. No signs of distress, no needs expressed. Call light within reach, encouraged patient to call with any needs. Will continue to monitor.

## 2021-03-13 NOTE — PROGRESS NOTES
No acute events overnight. Patient resting quietly in bed. Respirations present, even and unlabored on room air. Bed low and locked, safety measures in place. Daily weight documented in flowsheet. No signs of distress, no needs expressed. Call light within reach, encouraged patient to call with any needs. Preparing to give report to oncoming RN.

## 2021-03-13 NOTE — DISCHARGE INSTRUCTIONS
Patient Education        Gastrointestinal Bleeding: Care Instructions  Your Care Instructions     The digestive or gastrointestinal tract goes from the mouth to the anus. It is often called the GI tract. Bleeding can happen anywhere in the GI tract. It may be caused by an ulcer, an infection, or cancer. It may also be caused by medicines such as aspirin or ibuprofen. Light bleeding may not cause any symptoms at first. But if you continue to bleed for a while, you may feel very weak or tired. Sudden, heavy bleeding means you need to see a doctor right away. This kind of bleeding can be very dangerous. But it can usually be cured or controlled. The doctor may do some tests to find the cause of your bleeding. Follow-up care is a key part of your treatment and safety. Be sure to make and go to all appointments, and call your doctor if you are having problems. It's also a good idea to know your test results and keep a list of the medicines you take. How can you care for yourself at home? · Be safe with medicines. Take your medicines exactly as prescribed. Call your doctor if you think you are having a problem with your medicine. You will get more details on the specific medicines your doctor prescribes. · Do not take aspirin or other anti-inflammatory medicines, such as naproxen (Aleve) or ibuprofen (Advil, Motrin), without talking to your doctor first. Ask your doctor if it is okay to use acetaminophen (Tylenol). · Do not drink alcohol. · The bleeding may make you lose iron. So it's important to eat foods that have a lot of iron. These include red meat, shellfish, poultry, and eggs. They also include beans, raisins, whole-grain breads, and leafy green vegetables. If you want help planning meals, you can make an appointment with a dietitian. When should you call for help? Call 911 anytime you think you may need emergency care.  For example, call if:    · You have sudden, severe belly pain.     · You vomit blood or what looks like coffee grounds.     · You passed out (lost consciousness).     · Your stools are maroon or very bloody. Call your doctor now or seek immediate medical care if:    · You are dizzy or lightheaded, or you feel like you may faint.     · Your stools are black and look like tar, or they have streaks of blood.     · You have belly pain.     · You vomit or have nausea.     · You have trouble swallowing, or it hurts when you swallow. Watch closely for changes in your health, and be sure to contact your doctor if:    · You do not get better as expected. Where can you learn more? Go to http://www.gray.com/  Enter F981 in the search box to learn more about \"Gastrointestinal Bleeding: Care Instructions. \"  Current as of: June 26, 2019               Content Version: 12.6  © 3973-7955 [employer]. Care instructions adapted under license by [employer] (which disclaims liability or warranty for this information). If you have questions about a medical condition or this instruction, always ask your healthcare professional. [address] any warranty or liability for your use of this information.

## 2021-03-13 NOTE — PROGRESS NOTES
Pt is for discharge home today with no needs/supportive care orders recieved for CM at this time. Patient has declined HH. Medication has been sent to his [facility] pharmacy on [address]. Family will transport patient home. CM will continue to monitor and remain available for any needs or concerns that may occur. Care Management Interventions  PCP Verified by CM: Yes(Chyna Schwartz MD)  Mode of Transport at Discharge:  Other (see comment)(family)  Transition of Care Consult (CM Consult): Discharge Planning  Physical Therapy Consult: No  Occupational Therapy Consult: No  Current Support Network: Lives with Spouse, Own Home  Confirm Follow Up Transport: Family  Freedom of Choice List was Provided with Basic Dialogue that Supports the Patient's Individualized Plan of Care/Goals, Treatment Preferences and Shares the Quality Data Associated with the Providers?: Yes  [place] Resource Information Provided?: No  Discharge Location  Discharge Placement: Home

## 2021-03-13 NOTE — PROGRESS NOTES
Problem: Falls - Risk of  Goal: *Absence of Falls  Description: Document Bard  Fall Risk and appropriate interventions in the flowsheet.   Outcome: Progressing Towards Goal  Note: Fall Risk Interventions:  Mobility Interventions: Communicate number of staff needed for ambulation/transfer         Medication Interventions: Evaluate medications/consider consulting pharmacy    Elimination Interventions: Call light in reach

## 2021-03-13 NOTE — PROGRESS NOTES
Pt resting in bed comfortably at this time, alert and oriented times 4. No distress noted, respirations even and unlabored on room air. Pt denies pain at this time. Pt instructed to call for assistance if needed, call light in place, will continue to monitor. Pt states no BM since last night. Pt instructed that if he did have a BM this am to call primary RN.

## 2021-03-13 NOTE — DISCHARGE SUMMARY
Hospitalist Discharge Summary     Patient ID:  Chriss Meckel  [id number]  89 y.o.  1/11/1933  Admit date: 3/10/2021  9:45 AM  Discharge date and time: 3/13/2021  Attending: Jayden Lux DO  PCP:  Adry Hampton MD  Treatment Team: Attending Provider: Jayden Lux DO; Utilization Review: Tamiko Arguetao; Care Manager: Stacey Jaramillo.; Primary Nurse: Inocencio Rubinstein, RN; Physical Therapist: Misti Cardenas PT    Principal Diagnosis GI bleed   Principal Problem:    GI bleed (3/10/2021)    Active Problems:    Hyperlipidemia (8/1/2012)      CAD (coronary artery disease) (8/1/2012)      Stage 3 chronic kidney disease (2/7/2019)      Type 2 diabetes mellitus without complication ([facility] [address] (6/3/0614)      Mixed hyperlipidemia (11/18/2020)      Essential hypertension with goal blood pressure less than 130/85 (11/18/2020)      Right ureteral stone (3/5/2021)             Hospital Course:  Please refer to the admission H&P for details of presentation. In summary, the patient is is a 80 y. o. male with medical h/o CKD stage 3, DM2, HTN, HLP, chronic pancreatitis who presented to [facility] ED with complaint of vomiting and melena. Patient was recently admitted to [facility] 3/5 - 3/7 r right ureteral stone requiring stent placement. CT at that time also notable for 6cm liver mass. He was scheduled for outpatient abd mri and oncology evaluation. He also completed treatment for Staph epidermis UTI with bactrim. Pt reports several episodes of coffee ground emesis as well as a few episodes of melena. Pt was actively having hematemesis in the ER. He was on ASA at home but not on any other blood thinners or OTC NSAIDs. He admited to some mid epigastric \"discomfort\".      ER workup noatble for hgb 10 (12.7 3/6), Cr 2.6 (was 2.6 on 3/7 and 1.8 on 3/2). ALT//158 (previously wnl). Hemoccult was positive. Gi was consulted by ED and pt was admitted for acute GIB. He was started on IV PPI BID and his home ASA was held.  He underwent EGD on 3/10 that showed old blood/large blood clots in stomach, Class B esophagitis. He was transfused 1U PRBC overnight 3/11 for Hgb 6.8. He underwent repeated EGD on 3/11 that showed duodenitis as well as LA class B esophagitis in the distal esophagus with esophageal ulcer which was most likely the source of bleeding. Biopsy pathologies obtained and pt was recommended to avoid NSAIDS, continue with PPI BID, Carafate QID, start with clear liquid diet and advance as tolerated, repeat EGD in 8-12 weeks to confirm healing of ulcer and esophagitis. He received 2 units of PRBC's after second EGD but his Hgb remained stable subsequently after. He tolerated po well, GI signed off and pt would like to go home.     He was also found to have a liver mass 6 cm on previous CT ab/pelv as well as elevated LFTs on admission. His hepatitis panel was negative. GI has ordered AFP which was wnl at 4.9. Per patient he was scheduled for outpt abd MRI and oncology evaluation. He was offered to have this evaluated inpatient but he declined. He also has an appointment with Urology coming up in next few days. Pt felt well on day of discharge and was discharged in hemodynamically stable condition. Discussed with pt extensively about return precautions and f/u with specialists. He will need to f/u with his PCP in 3-5 days for CBC and BMP. Also will need to follow up with GI in 3-4 weeks, follow up with Urology as well as Oncology as scheduled.     Significant Diagnostic Studies:   No orders to display         Labs: Results:       Chemistry Recent Labs     03/13/21  0740 03/12/21  0538 03/11/21  0626   *  --  120*     --  144   K 4.6  --  4.8   *  --  117*   CO2 23  --  23   BUN 29*  --  62*   CREA 1.75*  --  2.29*   CA 8.5  --  8.3   AGAP 4*  --  4*    132 171*   TP 5.2* 4.6* 4.8*   ALB 2.2* 2.0* 2.1*   GLOB 3.0 2.6 2.7   AGRAT 0.7* 0.8* 0.8*      CBC w/Diff Recent Labs     03/13/21  0740 03/12/21  1420 03/12/21  5600 03/11/21  0626 03/11/21  0626   WBC 7.7  --   --   --  8.5   RBC 3.23*  --   --   --  2.74*   HGB 9.1* 9.7* 6.9*   < > 7.8*   HCT 28.0* 30.9* 21.9*   < > 24.1*     --   --   --  214   GRANS  --   --   --   --  64   LYMPH  --   --   --   --  22   EOS  --   --   --   --  4    < > = values in this interval not displayed. Cardiac Enzymes No results for input(s): CPK, CKND1, NATALYA in the last 72 hours. No lab exists for component: CKRMB, TROIP   Coagulation Recent Labs     03/13/21  0825   PTP 14.1   INR 1.1       Lipid Panel Lab Results   Component Value Date/Time    Cholesterol, total 101 08/02/2012 05:34 AM    HDL Cholesterol 26 (L) 08/02/2012 05:34 AM    LDL, calculated 47.4 08/02/2012 05:34 AM    VLDL, calculated 27.6 (H) 08/02/2012 05:34 AM    Triglyceride 138 08/02/2012 05:34 AM    CHOL/HDL Ratio 3.9 08/02/2012 05:34 AM      BNP No results for input(s): BNPP in the last 72 hours. Liver Enzymes Recent Labs     03/13/21  0740   TP 5.2*   ALB 2.2*         Thyroid Studies No results found for: T4, T3U, TSH, TSHEXT         Discharge Exam:  Visit Vitals  /66   Pulse 64   Temp 98.2 °F (36.8 °C)   Resp 19   Ht 5' 9\" (1.753 m)   Wt 79.8 kg (176 lb)   SpO2 93%   BMI 25.99 kg/m²     General appearance: alert, cooperative, no distress, appears stated age   Lungs: clear to auscultation bilaterally  Heart: regular rate and rhythm, S1, S2 normal, no murmur, click, rub or gallop  Abdomen: soft, non-tender. Bowel sounds normal. No masses,  no organomegaly  Extremities: no cyanosis or edema  Neurologic: Grossly normal    Disposition: home  Discharge Condition: stable  Patient Instructions:   Current Discharge Medication List      START taking these medications    Details   pantoprazole (PROTONIX) 40 mg tablet Take 1 Tab by mouth two (2) times a day. Qty: 60 Tab, Refills: 1      sucralfate (CARAFATE) 1 gram tablet Take 1 Tab by mouth Before breakfast, lunch, dinner and at bedtime.   Qty: 120 Tab, Refills: 1         CONTINUE these medications which have NOT CHANGED    Details   lipase-protease-amylase (Creon) 24,000-76,000 -120,000 unit capsule Take 2 Caps by mouth three (3) times daily (with meals). 1-2 cap TID with meals and 1 cap with each snack      oxyCODONE-acetaminophen (Percocet) 5-325 mg per tablet Take 1 Tab by mouth every four (4) hours as needed for Pain for up to 7 days. Max Daily Amount: 6 Tabs. Qty: 20 Tab, Refills: 0    Associated Diagnoses: Kidney stone      hyoscyamine SL (Levsin/SL) 0.125 mg SL tablet 1 Tab by SubLINGual route every four (4) hours as needed for Cramping (bladder spasms). Qty: 30 Tab, Refills: 1      insulin glargine (Lantus U-100 Insulin) 100 unit/mL injection 25 Units by SubCUTAneous route two (2) times a day. Adjusted to meal CHO any range between 15-25 units      atorvastatin (Lipitor) 20 mg tablet Take 20 mg by mouth nightly. ZINC OXIDE PO Take  by mouth daily. ondansetron (ZOFRAN ODT) 4 mg disintegrating tablet Take 1 Tab by mouth every eight (8) hours as needed for Nausea. Qty: 20 Tab, Refills: 0      fluticasone propionate (FLONASE) 50 mcg/actuation nasal spray Use 2 sprays in each nostril daily  Qty: 1 Bottle, Refills: 5      insulin aspart (NOVOLOG FLEXPEN U-100 INSULIN SC) 15 Units by SubCUTAneous route Before breakfast, lunch, and dinner. Adjusted to meal CHO any range between 5-15 units      tamsulosin (FLOMAX) 0.4 mg capsule TAKE 1 CAPSULE BY MOUTH EVERYDAY AT BEDTIME      cholecalciferol (VITAMIN D3) 1,000 unit cap Take  by mouth daily. FISH OIL 1,000 mg Cap Take 2 Caps by mouth daily.  Am.  1 tsp liquid- concentrated  Last dose 5/13/14         STOP taking these medications       trimethoprim-sulfamethoxazole (Bactrim DS) 160-800 mg per tablet Comments:   Reason for Stopping:         lipase-protease-amylase (Creon) 12,000-38,000 -60,000 unit capsule Comments:   Reason for Stopping:         aspirin 81 mg Tab Comments:   Reason for Stopping:               Activity: Activity as tolerated  Diet: Diabetic Diet    Follow-up:   F/u with PCP in 3-5 days. Recheck CBC and BMP  F/u with GI in 3-4 weeks as scheduled  F/u with hematology/Oncology for liver mass as scheduled  F/u with Urology as scheduled      Time spent to discharge patient 35 minutes  Signed:   Swapna Obrien DO  3/13/2021  11:03 AM

## 2021-03-13 NOTE — PROGRESS NOTES
[facility] DAILY PROGRESS NOTE    Admit Date:  3/10/2021    CC:  Esophagitis with Esophageal Ulcer and Duodenitis/ New Liver Mass    Problem List:  Principal Problem:    GI bleed (3/10/2021)    Active Problems:    Hyperlipidemia (8/1/2012)      CAD (coronary artery disease) (8/1/2012)      Stage 3 chronic kidney disease (2/7/2019)      Type 2 diabetes mellitus without complication ([facility] [address] (2/6/4899)      Mixed hyperlipidemia (11/18/2020)      Essential hypertension with goal blood pressure less than 130/85 (11/18/2020)      Right ureteral stone (3/5/2021)      Mr. Cinthya Aceves is a 80 y. o. male with PMH of including but not limited to, recent obstructive calculus with stent placement on 3/7, new liver lesion with current oncology evaluation and normal AFP with pending MRI, chronic pancreatitis followed by Dr. Niesha Saez on Creon therapy, DM, TARIQ, claustrophobia who was seen in consultation at the request of Dr. Garces for coffee ground emesis and a single episode of melena.      On evaluation in the ED, he was found to have a Hgb of 10 (down from 12.7 on 3/6).    EGD was done on 3/10/21 which showed old blood with large clots in the stomach limiting visualization and esophagitis.       Repeat EGD was done on 3/11/21 after giving Reglan. This showed esophagitis with esophageal ulcer and duodenitis.       Hg 9.1 on last check. Was 9.7 last night. No further GI bleeding overnight.         1. Esophageal Ulcer with Esophagitis  2. Duodenitis  3. Transfused Acute Blood Loss Anemia  4. Elevated LFTs  5. New Liver Mass  6. Chronic Pancreatitis      - Continue to monitor H/H. Transfuse for Hg < 7  - Avoid NSAIDs  - Continue Protonix 40 mg BID  - Continue Carafate 1 g QID  - Will need repeat EGD in 8-12 weeks to confirm healing of the esophageal ulcer/ esophagitis. Can talk about this depending on the outcome of the evaluation of the liver mass.     - Patient is following up with Dr. Bianca Preciado with Oncology and they are planning for outpatient MRI for evaluation of the liver mass. I offered inpatient evaluation but patient prefers to do this all as outpatient. He says he has a lot going on right now with his GI bleed, then he has a TURP scheduled on Tuesday. - His LFTs have normalized. They were likely elevated from his liver mass. Can continue to monitor LFTs. His viral hepatitis panel was negative. - Can advance diet as he tolerates. - Continue Creon for chronic pancreatitis. - Will sign off. Please call us with any further questions or concerns. Will have patient follow up in clinic in 3-4 weeks.  Brennon Esteves MD   [facility]    Subjective:     Patient feels much better after transfusion yesterday. He says he is not having any more vomiting of blood. He has noted his stools to be dark but thinks they are clearing up. He is tolerating a GI soft diet.       Medications:   Current Facility-Administered Medications   Medication Dose Route Frequency Provider Last Rate Last Admin    0.9% sodium chloride infusion 250 mL  250 mL IntraVENous PRN Srini Calvo MD 15 mL/hr at 03/12/21 1031 250 mL at 03/12/21 1031    sucralfate (CARAFATE) tablet 1 g  1 g Oral AC&HS Radha Mcginnis MD   1 g at 03/13/21 0630    0.9% sodium chloride infusion 250 mL  250 mL IntraVENous PRMICHELE Calvo MD        insulin lispro (HUMALOG) injection   SubCUTAneous AC&HS Ladonna Smith MD   Stopped at 03/13/21 0630    pantoprazole (PROTONIX) 40 mg in 0.9% sodium chloride 10 mL injection  40 mg IntraVENous Q12H Alvin Cunningham NP   40 mg at 03/12/21 2110    lipase-protease-amylase (ZENPEP 20,000) capsule 1 Cap  1 Cap Oral TID WITH MEALS Jerrica Breaux Anneliese C, DO   1 Cap at 03/12/21 1628    atorvastatin (LIPITOR) tablet 20 mg  20 mg Oral QHS Narda Breaux C, DO   20 mg at 03/12/21 2109    cholecalciferol (VITAMIN D3) (1000 Units /25 mcg) tablet 1,000 Units  1,000 Units Oral DAILY Vonnie NDIAYE, DO   1,000 Units at 03/12/21 0900    fluticasone propionate (FLONASE) 50 mcg/actuation nasal spray 2 Spray  2 Spray Both Nostrils DAILY Narda Breaux DO   2 Spray at 03/12/21 0900    hyoscyamine SL (LEVSIN/SL) tablet 0.125 mg  0.125 mg SubLINGual Q4H PRN Gregory Breaux, DO        insulin glargine (LANTUS) injection 10 Units  10 Units SubCUTAneous ACB/HS Vonnie NDIAYE, DO   10 Units at 03/13/21 0615    tamsulosin (FLOMAX) capsule 0.4 mg  0.4 mg Oral BIDPC Narda Breaux, DO   0.4 mg at 03/12/21 1709    sodium chloride (NS) flush 5-40 mL  5-40 mL IntraVENous Q8H Narda Breaux, DO   10 mL at 03/13/21 0600    sodium chloride (NS) flush 5-40 mL  5-40 mL IntraVENous PRN Vonnie NDIAYE, DO        acetaminophen (TYLENOL) tablet 650 mg  650 mg Oral Q6H PRN Kevin NDIAYE, DO   650 mg at 03/12/21 0241    Or    acetaminophen (TYLENOL) suppository 650 mg  650 mg Rectal Q6H PRN Vonnie NDIAYE, DO        polyethylene glycol (MIRALAX) packet 17 g  17 g Oral DAILY PRN Vonnie NDIAYE, DO        promethazine (PHENERGAN) tablet 12.5 mg  12.5 mg Oral Q6H PRN Vonnie NDIAYE, DO        Or    ondansetron [facility]) injection 4 mg  4 mg IntraVENous Q6H PRN Narda Breaux, DO   4 mg at 03/10/21 1746    0.9% sodium chloride infusion  50 mL/hr IntraVENous CONTINUOUS Mathur, Thu, DO 50 mL/hr at 03/13/21 0113 50 mL/hr at 03/13/21 0113       Review of Systems:  ROS was obtained, with pertinent positives as listed above. No chest pain or SOB. Diet:      Objective:   Vitals:  Visit Vitals  BP (!) 112/59   Pulse 65   Temp 98 °F (36.7 °C)   Resp 19   Ht 5' 9\" (1.753 m)   Wt 79.8 kg (176 lb)   SpO2 95%   BMI 25.99 kg/m²     Intake/Output:  No intake/output data recorded. 03/11 1901 - 03/13 0700  In: 782.5   Out: 2110 [Urine:2110]  Exam:  General appearance: alert, cooperative, no distress  HEENT: different eye colors.   Lungs: clear to auscultation bilaterally anteriorly  Heart: regular rate and rhythm  Abdomen: soft, non-tender.  Bowel sounds normal. No masses, no organomegaly  Extremities: extremities normal, atraumatic, no cyanosis or edema  Neuro:  alert and oriented    Data Review (Labs):    Recent Labs     03/12/21  1420 03/12/21  0632 03/12/21  0538 03/12/21  0035 03/11/21  1635 03/11/21  0626 03/10/21  2342 03/10/21  1937 03/10/21  1932 03/10/21  0957   WBC  --   --   --   --   --  8.5  --   --   --  13.0*   HGB 9.7* 6.9* 6.7* 7.0* 7.7* 7.8* 6.8*  --  7.1* 10.0*   HCT 30.9* 21.9* 21.5* 22.0* 24.9* 24.1* 21.7*  --  23.1* 31.5*   PLT  --   --   --   --   --  214  --   --   --  284   MCV  --   --   --   --   --  88.0  --   --   --  88.0   NA  --   --   --   --   --  144  --   --   --  138   K  --   --   --   --   --  4.8  --   --   --  4.5   CL  --   --   --   --   --  117*  --   --   --  107   CO2  --   --   --   --   --  23  --   --   --  23   BUN  --   --   --   --   --  62*  --   --   --  62*   AST  --   --  33  --   --  45*  --  74*  --  158*   ALT  --   --  37  --   --  51  --  69*  --  110*   INR  --   --   --   --   --   --   --   --   --  1.1         Trenton Rivera MD  [facility]

## 2021-03-13 NOTE — PROGRESS NOTES
Discharge instructions reviewed with patient. Pt given opportunity for questions if asked. IV's removed, cath tip intact. Pt belongings packed and with the patient. Daughter will be here shortly to pick patient up. Will continue to monitor.

## 2021-03-15 LAB
BACTERIA SPEC CULT: NORMAL
SERVICE CMNT-IMP: NORMAL

## 2021-03-23 ENCOUNTER — HOSPITAL ENCOUNTER (OUTPATIENT)
Dept: SURGERY | Age: 86
Discharge: HOME | End: 2021-03-23

## 2021-03-23 VITALS
DIASTOLIC BLOOD PRESSURE: 62 MMHG | WEIGHT: 182.8 LBS | HEIGHT: 69 IN | TEMPERATURE: 97.3 F | BODY MASS INDEX: 27.08 KG/M2 | RESPIRATION RATE: 18 BRPM | SYSTOLIC BLOOD PRESSURE: 136 MMHG | HEART RATE: 65 BPM | OXYGEN SATURATION: 98 %

## 2021-03-23 NOTE — PERIOP NOTES
Patient verified name and     Order for consent found in EHR and matches case posting; patient verified.     Type 2 surgery, in person PAT assessment complete.    Labs per surgeon: None  Labs per anesthesia protocol: CBC, BMP from 3/13 and 3/15/21 were reviewed by Dr. Caceres; orders for Hgb DOS. Dr. Caceres is aware that patient has RACQUEL from , however patient has had significant bleeding that required 3 PRBCs from 3/10 - 3/12/21. Patient to remain off of ASA per Dr. Caceres.   EK2020; Stress test from 2019 shows EF 71%. Reviewed by Dr. Caceres. No further orders.     Patient COVID test date 3/23/21; Patient did show for the appointment.     Hospital approved surgical skin cleanser and instructions given per hospital policy.    Patient provided with and instructed on educational handouts including Guide to Surgery, Pain Management, Hand Hygiene, Blood Transfusion Education, and [facility] Brochure.    Patient answered medical/surgical history questions at their best of ability. All prior to admission medications documented in [facility]. Original medication prescription bottle visualized during patient appointment.     Patient instructed to hold all vitamins 7 days prior to surgery and NSAIDS 5 days prior to surgery, patient verbalized understanding.     Patient teach back successful and patient demonstrates knowledge of instructions.

## 2021-03-23 NOTE — PERIOP NOTES
PLEASE CONTINUE TAKING ALL PRESCRIPTION MEDICATIONS UP TO THE DAY OF SURGERY UNLESS OTHERWISE DIRECTED BELOW. DISCONTINUE all vitamins and supplements 7 days prior to surgery. DISCONTINUE Non-Steriodal Anti-Inflammatory (NSAIDS) such as Advil and Aleve 5 days prior to surgery. Home Medications to take  the day of surgery   Pantoprazole/protonix   Sucralfate/carafate    Lantus 20 units night before surgery (3/29/2021 PM)  Lantus 20 units morning of surgery  (3/30/2021 AM)           Home Medications   to Hold   DISCONTINUE all vitamins and supplements 7 days prior to surgery. DISCONTINUE Non-Steriodal Anti-Inflammatory (NSAIDS) such as Advil and Aleve 5 days prior to surgery. Comments                Please do not bring home medications with you on the day of surgery unless otherwise directed by your nurse. If you are instructed to bring home medications, please give them to your nurse as they will be administered by the nursing staff. If you have any questions, please call [facility] [phone] or [facility] [phone]. A copy of this note was provided to the patient for reference.

## 2021-03-28 ENCOUNTER — APPOINTMENT (OUTPATIENT)
Dept: GENERAL RADIOLOGY | Age: 86
End: 2021-03-28
Attending: EMERGENCY MEDICINE
Payer: MEDICARE

## 2021-03-28 ENCOUNTER — HOSPITAL ENCOUNTER (EMERGENCY)
Age: 86
Discharge: HOME | End: 2021-03-28
Attending: EMERGENCY MEDICINE
Payer: MEDICARE

## 2021-03-28 VITALS
HEART RATE: 54 BPM | WEIGHT: 182 LBS | DIASTOLIC BLOOD PRESSURE: 63 MMHG | BODY MASS INDEX: 26.96 KG/M2 | HEIGHT: 69 IN | SYSTOLIC BLOOD PRESSURE: 131 MMHG | OXYGEN SATURATION: 97 % | TEMPERATURE: 97.7 F | RESPIRATION RATE: 12 BRPM

## 2021-03-28 DIAGNOSIS — R11.2 NON-INTRACTABLE VOMITING WITH NAUSEA, UNSPECIFIED VOMITING TYPE: Primary | ICD-10-CM

## 2021-03-28 LAB
ALBUMIN SERPL-MCNC: 3 G/DL (ref 3.2–4.6)
ALBUMIN/GLOB SERPL: 0.8 {RATIO} (ref 1.2–3.5)
ALP SERPL-CCNC: 82 U/L (ref 50–136)
ALT SERPL-CCNC: 22 U/L (ref 12–65)
ANION GAP SERPL CALC-SCNC: 9 MMOL/L (ref 7–16)
AST SERPL-CCNC: 15 U/L (ref 15–37)
ATRIAL RATE: 53 BPM
BASOPHILS # BLD: 0.1 K/UL (ref 0–0.2)
BASOPHILS NFR BLD: 1 % (ref 0–2)
BILIRUB SERPL-MCNC: 0.3 MG/DL (ref 0.2–1.1)
BUN SERPL-MCNC: 24 MG/DL (ref 8–23)
CALCIUM SERPL-MCNC: 8.9 MG/DL (ref 8.3–10.4)
CALCULATED P AXIS, ECG09: 56 DEGREES
CALCULATED R AXIS, ECG10: 59 DEGREES
CALCULATED T AXIS, ECG11: 70 DEGREES
CHLORIDE SERPL-SCNC: 113 MMOL/L (ref 98–107)
CO2 SERPL-SCNC: 21 MMOL/L (ref 21–32)
CREAT SERPL-MCNC: 2.44 MG/DL (ref 0.8–1.5)
DIAGNOSIS, 93000: NORMAL
DIFFERENTIAL METHOD BLD: ABNORMAL
EOSINOPHIL # BLD: 0.9 K/UL (ref 0–0.8)
EOSINOPHIL NFR BLD: 14 % (ref 0.5–7.8)
ERYTHROCYTE [DISTWIDTH] IN BLOOD BY AUTOMATED COUNT: 14.9 % (ref 11.9–14.6)
GLOBULIN SER CALC-MCNC: 3.6 G/DL (ref 2.3–3.5)
GLUCOSE SERPL-MCNC: 157 MG/DL (ref 65–100)
HCT VFR BLD AUTO: 32.1 % (ref 41.1–50.3)
HGB BLD-MCNC: 10.1 G/DL (ref 13.6–17.2)
IMM GRANULOCYTES # BLD AUTO: 0 K/UL (ref 0–0.5)
IMM GRANULOCYTES NFR BLD AUTO: 0 % (ref 0–5)
LIPASE SERPL-CCNC: 20 U/L (ref 73–393)
LYMPHOCYTES # BLD: 1.8 K/UL (ref 0.5–4.6)
LYMPHOCYTES NFR BLD: 28 % (ref 13–44)
MAGNESIUM SERPL-MCNC: 2 MG/DL (ref 1.8–2.4)
MCH RBC QN AUTO: 28.1 PG (ref 26.1–32.9)
MCHC RBC AUTO-ENTMCNC: 31.5 G/DL (ref 31.4–35)
MCV RBC AUTO: 89.4 FL (ref 79.6–97.8)
MONOCYTES # BLD: 0.6 K/UL (ref 0.1–1.3)
MONOCYTES NFR BLD: 9 % (ref 4–12)
NEUTS SEG # BLD: 3.1 K/UL (ref 1.7–8.2)
NEUTS SEG NFR BLD: 47 % (ref 43–78)
NRBC # BLD: 0 K/UL (ref 0–0.2)
P-R INTERVAL, ECG05: 236 MS
PLATELET # BLD AUTO: 210 K/UL (ref 150–450)
PMV BLD AUTO: 10.4 FL (ref 9.4–12.3)
POTASSIUM SERPL-SCNC: 3.7 MMOL/L (ref 3.5–5.1)
PROT SERPL-MCNC: 6.6 G/DL (ref 6.3–8.2)
Q-T INTERVAL, ECG07: 428 MS
QRS DURATION, ECG06: 98 MS
QTC CALCULATION (BEZET), ECG08: 401 MS
RBC # BLD AUTO: 3.59 M/UL (ref 4.23–5.6)
SODIUM SERPL-SCNC: 143 MMOL/L (ref 136–145)
TROPONIN-HIGH SENSITIVITY: 8.5 PG/ML (ref 0–14)
VENTRICULAR RATE, ECG03: 53 BPM
WBC # BLD AUTO: 6.5 K/UL (ref 4.3–11.1)

## 2021-03-28 PROCEDURE — 74011250636 HC RX REV CODE- 250/636: Performed by: EMERGENCY MEDICINE

## 2021-03-28 PROCEDURE — 83690 ASSAY OF LIPASE: CPT

## 2021-03-28 PROCEDURE — 85025 COMPLETE CBC W/AUTO DIFF WBC: CPT

## 2021-03-28 PROCEDURE — 96360 HYDRATION IV INFUSION INIT: CPT

## 2021-03-28 PROCEDURE — 83735 ASSAY OF MAGNESIUM: CPT

## 2021-03-28 PROCEDURE — 71045 X-RAY EXAM CHEST 1 VIEW: CPT

## 2021-03-28 PROCEDURE — 84484 ASSAY OF TROPONIN QUANT: CPT

## 2021-03-28 PROCEDURE — 99285 EMERGENCY DEPT VISIT HI MDM: CPT

## 2021-03-28 PROCEDURE — 80053 COMPREHEN METABOLIC PANEL: CPT

## 2021-03-28 PROCEDURE — 93005 ELECTROCARDIOGRAM TRACING: CPT | Performed by: EMERGENCY MEDICINE

## 2021-03-28 RX ADMIN — SODIUM CHLORIDE 500 ML: 900 INJECTION, SOLUTION INTRAVENOUS at 02:44

## 2021-03-28 NOTE — DISCHARGE INSTRUCTIONS
Use your Zofran as needed for nausea and vomiting. Follow-up with your doctor or return to the emergency department for any other acute concerns.

## 2021-03-28 NOTE — ED PROVIDER NOTES
Patient is an 80-year-old male with a history of hypertension, diabetes, heart disease, chronic pancreatitis who comes to the emergency department tonight via EMS. Patient states he awoke from sleep about an hour ago with nausea, vomiting, diaphoresis, generalized weakness. He denies any chest pain. No shortness of breath. He was recently in the hospital for upper GI bleed. The history is provided by the patient. Vomiting   This is a new problem. The current episode started 1 to 2 hours ago. The problem has not changed since onset. The emesis has an appearance of stomach contents. There has been no fever. Associated symptoms include sweats. Pertinent negatives include no chills, no fever, no abdominal pain, no diarrhea and no cough. His pertinent negatives include no inflammatory bowel disease and no recent abdominal surgery. Past Medical History:   Diagnosis Date    Abnormal CT of liver 03/02/2021    New heterogeneous liver mass measuring up to 6.4 cm.  Further evaluation required    Anemia     GI esophageal ulcer per EGD- was given 2 units prbc's 3/12/21, eliquis has been stopped    AR (allergic rhinitis) 8/3/2016    Arthritis     Benign paroxysmal positional vertigo     Bilateral impacted cerumen     BPH (benign prostatic hyperplasia)     CAD (coronary artery disease)     2003 stent placed; MI in 2003     Chronic kidney disease     Stage III kidney disease elevated creatinine BUN    Chronic otitis media     Chronic pain     herniated disc in lower back; ESIs in recent past    Chronic pancreatitis ([facility] [address]     Claustrophobia     Diverticulitis 8/3/2016    DNS (deviated nasal septum)     ETD (eustachian tube dysfunction)     GERD (gastroesophageal reflux disease)     controlled w/ prescription meds and OTC meds (pepcid)    H/O heart artery stent     S/P RCA stent by Dr. Romano Galion in 2003    Heart disease 8/3/2016    High frequency hearing loss     History of bilateral inguinal hernia repair     History of gastroesophageal reflux (GERD)     controlled w/ med; no breakthrough GERD    Hx of exercise stress test 02/22/2019    EF on Stress test 71%    Hypercholesterolemia     takes statin and fish oil    Hyperlipidemia     takes statin and fish oil    Hypertrophy of both inferior nasal turbinates     Kidney stone     Liver disease     going for MRI; recent CT showed mass on liver    MHL (mixed hearing loss)     NO (nasal obstruction)     Otitis media, nonsuppurative     SNHL (sensorineural hearing loss) 8/3/2016    Squamous cell carcinoma, arm     left forearm near wrist; several other sites as well    Type 2 diabetes mellitus (HCC)     insulin reliant/AVG FBS: 100-120/s.s of hypoglycemia at 70/last A1c:7.5    Unspecified sleep apnea     no cpap; patient states he lost weight and does not have TARIQ anymore    Vertigo        Past Surgical History:   Procedure Laterality Date    HX APPENDECTOMY      HX CATARACT REMOVAL Bilateral     HX COLONOSCOPY      HX CORONARY STENT PLACEMENT  2003    stent RCA 2003    HX ENDOSCOPY  03/11/2021    HX ERCP  12/2015    ENDOSCOPIC RETROGRADE CHOLANGIOPANCREATOGRAPHY (N/A Upper GI Region) ENDOSCOPIC SPHINCTEROTOMY (N/A [facility] GI [place]) ENDOSCOPIC STONE EXTRACTION/BALLOON SWEEP (N/A Upper GI Region)    HX HERNIA REPAIR Bilateral 1980s    HX HERNIA REPAIR Left 2014    LIH with mesh    HX MASTOIDECTOMY  1996    right modified canal wall down    HX ORTHOPAEDIC Right 1/14/03    Dupuytrens contracture release    HX OTHER SURGICAL      skin lesion; local excision: SCC    HX TURP      HX TYMPANOSTOMY Right     tube right ear    HX VITRECTOMY      [place] CYSTOSCOPY,INSERT URETERAL STENT Right 03/05/2021         Family History:   Problem Relation Age of Onset    Cancer Maternal Grandmother         colon    Heart Disease Maternal Grandfather        Social History     Socioeconomic History    Marital status:      Spouse name: Not on file    Number of children: Not on file    Years of education: Not on file    Highest education level: Not on file   Occupational History    Not on file   Social Needs    Financial resource strain: Not on file    Food insecurity     Worry: Not on file     Inability: Not on file    Transportation needs     Medical: Not on file     Non-medical: Not on file   Tobacco Use    Smoking status: Never Smoker    Smokeless tobacco: Never Used   Substance and Sexual Activity    Alcohol use: No    Drug use: No    Sexual activity: Not on file   Lifestyle    Physical activity     Days per week: Not on file     Minutes per session: Not on file    Stress: Not on file   Relationships    Social connections     Talks on phone: Not on file     Gets together: Not on file     Attends [employer] service: Not on file     Active member of club or organization: Not on file     Attends meetings of clubs or organizations: Not on file     Relationship status: Not on file    Intimate partner violence     Fear of current or ex partner: Not on file     Emotionally abused: Not on file     Physically abused: Not on file     Forced sexual activity: Not on file   Other Topics Concern    Not on file   Social History Narrative    Not on file         ALLERGIES: Iohexol, Other medication, Atorvastatin, Cipro [ciprofloxacin], Codeine, Iodinated contrast media, Penicillins, and Valdecoxib    Review of Systems   Constitutional: Positive for fatigue. Negative for chills and fever. HENT: Negative for congestion, rhinorrhea and sore throat. Eyes: Negative for pain, discharge and visual disturbance. Respiratory: Negative for cough and shortness of breath. Cardiovascular: Negative for chest pain and palpitations. Gastrointestinal: Positive for nausea and vomiting. Negative for abdominal pain and diarrhea. Endocrine: Negative for polydipsia and polyuria. Genitourinary: Negative for dysuria, frequency and urgency.    Musculoskeletal: Negative for back pain and neck pain. Skin: Negative for rash. Neurological: Negative for seizures, syncope and weakness. Hematological: Negative. Vitals:    03/28/21 0123   BP: (!) 172/78   Pulse: (!) 55   Resp: 16   Temp: 97.7 °F (36.5 °C)   SpO2: 100%   Weight: 82.6 kg (182 lb)   Height: 5' 9\" (1.753 m)            Physical Exam  Vitals signs and nursing note reviewed. Constitutional:       Appearance: Normal appearance. He is well-developed. He is ill-appearing. HENT:      Head: Normocephalic and atraumatic. Nose: Nose normal.      Mouth/Throat:      Mouth: Mucous membranes are dry. Eyes:      Extraocular Movements: Extraocular movements intact. Neck:      Musculoskeletal: Normal range of motion and neck supple. Cardiovascular:      Rate and Rhythm: Regular rhythm. Bradycardia present. Heart sounds: Normal heart sounds. Pulmonary:      Effort: Pulmonary effort is normal.      Breath sounds: Normal breath sounds. Abdominal:      Palpations: Abdomen is soft. Tenderness: There is no abdominal tenderness. There is no guarding or rebound. Musculoskeletal: Normal range of motion. General: No tenderness. Lymphadenopathy:      Cervical: No cervical adenopathy. Skin:     General: Skin is warm and dry. Capillary Refill: Capillary refill takes less than 2 seconds. Coloration: Skin is pale. Findings: No rash. Neurological:      General: No focal deficit present. Mental Status: He is alert and oriented to person, place, and time. GCS: GCS eye subscore is 4. GCS verbal subscore is 5. GCS motor subscore is 6. Cranial Nerves: No cranial nerve deficit. Sensory: No sensory deficit. Motor: Motor function is intact. No weakness. MDM  Number of Diagnoses or Management Options  Diagnosis management comments: I wore appropriate PPE throughout this patient's ED visit.  Antonio Michael MD, 1:32 AM    3:11 AM  Patient received some Zofran prior to arrival.  Checked all his basic labs. Hemoglobin 10 which is better than his recent admission. Chronic renal insufficiency slightly above his baseline. Patient is given some IV fluids. He is observed in the ED. All of his vitals have remained normal.  He has had no further vomiting. States that he has some Zofran at home. Voice dictation software was used during the making of this note. This software is not perfect and grammatical and other typographical errors may be present. This note has been proofread, but may still contain errors.   Andrews Casarez MD; 3/28/2021 @3:13 AM   ===================================================================             Amount and/or Complexity of Data Reviewed  Clinical lab tests: ordered and reviewed  Tests in the radiology section of CPT®: ordered and reviewed  Tests in the medicine section of CPT®: ordered and reviewed  Review and summarize past medical records: yes  Independent visualization of images, tracings, or specimens: yes    Risk of Complications, Morbidity, and/or Mortality  Presenting problems: moderate  Diagnostic procedures: low  Management options: low    Patient Progress  Patient progress: stable         EKG    Date/Time: 3/28/2021 1:33 AM  Performed by: Radha Ceballos MD  Authorized by: Radha Ceballos MD     ECG reviewed by ED Physician in the absence of a cardiologist: yes    Previous ECG:     Previous ECG:  Unavailable  Interpretation:     Interpretation: abnormal    Rate:     ECG rate:  53    ECG rate assessment: bradycardic    Rhythm:     Rhythm: sinus rhythm    Ectopy:     Ectopy: none    Conduction:     Conduction: abnormal      Abnormal conduction: 1st degree    ST segments:     ST segments:  Normal  T waves:     T waves: normal

## 2021-03-28 NOTE — ED NOTES
I have reviewed discharge instructions with the patient. The patient verbalized understanding. Patient left ED via Discharge Method: stretcher to [facility] with [facility]. Opportunity for questions and clarification provided. Patient given 0 scripts. To continue your aftercare when you leave the hospital, you may receive an automated call from our care team to check in on how you are doing. This is a free service and part of our promise to provide the best care and service to meet your aftercare needs.  If you have questions, or wish to unsubscribe from this service please call [phone]. Thank you for Choosing our [facility] Emergency Department.

## 2021-03-28 NOTE — ED TRIAGE NOTES
Patient arrives to ED via EMS from home. Patient called out for n/v and dizziness x few hours. When EMS arrived patient was diaphoretic and pale. Patient has vomited 5-6 times tonight. Patient states, \"I think I'm having a heart attack\". Denies chest pain or SOB. Patient had a bleeding esophagus last week. Denies bloody emesis. Denies abdominal pain.      In route:  4 mg zofran

## 2021-03-29 ENCOUNTER — ANESTHESIA EVENT (OUTPATIENT)
Dept: SURGERY | Age: 86
End: 2021-03-29
Payer: MEDICARE

## 2021-03-30 ENCOUNTER — ANESTHESIA (OUTPATIENT)
Dept: SURGERY | Age: 86
End: 2021-03-30
Payer: MEDICARE

## 2021-03-30 ENCOUNTER — HOSPITAL ENCOUNTER (OUTPATIENT)
Age: 86
Discharge: HOME | End: 2021-03-31
Attending: UROLOGY | Admitting: UROLOGY
Payer: MEDICARE

## 2021-03-30 DIAGNOSIS — N40.1 BENIGN PROSTATIC HYPERPLASIA WITH LOWER URINARY TRACT SYMPTOMS, SYMPTOM DETAILS UNSPECIFIED: ICD-10-CM

## 2021-03-30 DIAGNOSIS — N20.1 RIGHT URETERAL STONE: ICD-10-CM

## 2021-03-30 PROBLEM — N40.0 BPH (BENIGN PROSTATIC HYPERPLASIA): Status: ACTIVE | Noted: 2021-03-30

## 2021-03-30 LAB
ANION GAP SERPL CALC-SCNC: 2 MMOL/L (ref 7–16)
BUN SERPL-MCNC: 27 MG/DL (ref 8–23)
CALCIUM SERPL-MCNC: 8.5 MG/DL (ref 8.3–10.4)
CHLORIDE SERPL-SCNC: 112 MMOL/L (ref 98–107)
CO2 SERPL-SCNC: 28 MMOL/L (ref 21–32)
CREAT SERPL-MCNC: 2.15 MG/DL (ref 0.8–1.5)
ERYTHROCYTE [DISTWIDTH] IN BLOOD BY AUTOMATED COUNT: 14.9 % (ref 11.9–14.6)
EST. AVERAGE GLUCOSE BLD GHB EST-MCNC: 143 MG/DL
GLUCOSE BLD STRIP.AUTO-MCNC: 112 MG/DL (ref 65–100)
GLUCOSE BLD STRIP.AUTO-MCNC: 121 MG/DL (ref 65–100)
GLUCOSE BLD STRIP.AUTO-MCNC: 93 MG/DL (ref 65–100)
GLUCOSE SERPL-MCNC: 89 MG/DL (ref 65–100)
HBA1C MFR BLD: 6.6 % (ref 4.2–6.3)
HCT VFR BLD AUTO: 33.2 % (ref 41.1–50.3)
HGB BLD-MCNC: 10.2 G/DL (ref 13.6–17.2)
MCH RBC QN AUTO: 28.1 PG (ref 26.1–32.9)
MCHC RBC AUTO-ENTMCNC: 30.7 G/DL (ref 31.4–35)
MCV RBC AUTO: 91.5 FL (ref 79.6–97.8)
NRBC # BLD: 0 K/UL (ref 0–0.2)
PLATELET # BLD AUTO: 192 K/UL (ref 150–450)
PMV BLD AUTO: 10.4 FL (ref 9.4–12.3)
POTASSIUM SERPL-SCNC: 4.8 MMOL/L (ref 3.5–5.1)
RBC # BLD AUTO: 3.63 M/UL (ref 4.23–5.6)
SERVICE CMNT-IMP: ABNORMAL
SERVICE CMNT-IMP: ABNORMAL
SERVICE CMNT-IMP: NORMAL
SODIUM SERPL-SCNC: 142 MMOL/L (ref 136–145)
WBC # BLD AUTO: 9.2 K/UL (ref 4.3–11.1)

## 2021-03-30 PROCEDURE — 77030040831 HC BAG URINE DRNG MDII -A: Performed by: UROLOGY

## 2021-03-30 PROCEDURE — 74011000250 HC RX REV CODE- 250: Performed by: ANESTHESIOLOGY

## 2021-03-30 PROCEDURE — 52630 REMOVE PROSTATE REGROWTH: CPT | Performed by: UROLOGY

## 2021-03-30 PROCEDURE — 77030040361 HC SLV COMPR DVT MDII -B: Performed by: UROLOGY

## 2021-03-30 PROCEDURE — C1894 INTRO/SHEATH, NON-LASER: HCPCS | Performed by: UROLOGY

## 2021-03-30 PROCEDURE — 74011250637 HC RX REV CODE- 250/637: Performed by: UROLOGY

## 2021-03-30 PROCEDURE — 74011250636 HC RX REV CODE- 250/636: Performed by: ANESTHESIOLOGY

## 2021-03-30 PROCEDURE — 76010000153 HC OR TIME 1.5 TO 2 HR: Performed by: UROLOGY

## 2021-03-30 PROCEDURE — 74011250636 HC RX REV CODE- 250/636: Performed by: UROLOGY

## 2021-03-30 PROCEDURE — 77030019927 HC TBNG IRR CYSTO BAXT -A: Performed by: UROLOGY

## 2021-03-30 PROCEDURE — 74011000250 HC RX REV CODE- 250: Performed by: NURSE ANESTHETIST, CERTIFIED REGISTERED

## 2021-03-30 PROCEDURE — 76060000034 HC ANESTHESIA 1.5 TO 2 HR: Performed by: UROLOGY

## 2021-03-30 PROCEDURE — 2709999900 HC NON-CHARGEABLE SUPPLY

## 2021-03-30 PROCEDURE — 77030041444 HC ELECTRD PSS QUICK-FIRE PSSU -D: Performed by: UROLOGY

## 2021-03-30 PROCEDURE — 88305 TISSUE EXAM BY PATHOLOGIST: CPT

## 2021-03-30 PROCEDURE — C2617 STENT, NON-COR, TEM W/O DEL: HCPCS | Performed by: UROLOGY

## 2021-03-30 PROCEDURE — 85027 COMPLETE CBC AUTOMATED: CPT

## 2021-03-30 PROCEDURE — 82962 GLUCOSE BLOOD TEST: CPT

## 2021-03-30 PROCEDURE — 80048 BASIC METABOLIC PNL TOTAL CA: CPT

## 2021-03-30 PROCEDURE — C1769 GUIDE WIRE: HCPCS | Performed by: UROLOGY

## 2021-03-30 PROCEDURE — 77030019908 HC STETH ESOPH SIMS -A: Performed by: ANESTHESIOLOGY

## 2021-03-30 PROCEDURE — 76210000006 HC OR PH I REC 0.5 TO 1 HR: Performed by: UROLOGY

## 2021-03-30 PROCEDURE — 77030005546 HC CATH URETH FOL 3W BARD -A: Performed by: UROLOGY

## 2021-03-30 PROCEDURE — 77030007880 HC KT SPN EPDRL BBMI -B: Performed by: ANESTHESIOLOGY

## 2021-03-30 PROCEDURE — 74011250636 HC RX REV CODE- 250/636: Performed by: NURSE ANESTHETIST, CERTIFIED REGISTERED

## 2021-03-30 PROCEDURE — 2709999900 HC NON-CHARGEABLE SUPPLY: Performed by: UROLOGY

## 2021-03-30 PROCEDURE — 74011636637 HC RX REV CODE- 636/637: Performed by: UROLOGY

## 2021-03-30 PROCEDURE — 77030040361 HC SLV COMPR DVT MDII -B

## 2021-03-30 PROCEDURE — 74011250637 HC RX REV CODE- 250/637: Performed by: ANESTHESIOLOGY

## 2021-03-30 PROCEDURE — 52356 CYSTO/URETERO W/LITHOTRIPSY: CPT | Performed by: UROLOGY

## 2021-03-30 PROCEDURE — 77030035011 HC LSR FBR HOLM FLXIVA TRAC TIP BSC -F: Performed by: UROLOGY

## 2021-03-30 PROCEDURE — 77030020463 HC FCPS ENDOSC STN BSC -C: Performed by: UROLOGY

## 2021-03-30 PROCEDURE — 74011000250 HC RX REV CODE- 250: Performed by: UROLOGY

## 2021-03-30 PROCEDURE — 83036 HEMOGLOBIN GLYCOSYLATED A1C: CPT

## 2021-03-30 DEVICE — URETERAL STENT
Type: IMPLANTABLE DEVICE | Site: URETER | Status: FUNCTIONAL
Brand: PERCUFLEX™ PLUS

## 2021-03-30 RX ORDER — SODIUM CHLORIDE, SODIUM LACTATE, POTASSIUM CHLORIDE, CALCIUM CHLORIDE 600; 310; 30; 20 MG/100ML; MG/100ML; MG/100ML; MG/100ML
150 INJECTION, SOLUTION INTRAVENOUS CONTINUOUS
Status: DISCONTINUED | OUTPATIENT
Start: 2021-03-30 | End: 2021-03-30 | Stop reason: HOSPADM

## 2021-03-30 RX ORDER — SODIUM CHLORIDE 0.9 % (FLUSH) 0.9 %
5-40 SYRINGE (ML) INJECTION EVERY 8 HOURS
Status: DISCONTINUED | OUTPATIENT
Start: 2021-03-30 | End: 2021-03-30 | Stop reason: HOSPADM

## 2021-03-30 RX ORDER — OXYCODONE HYDROCHLORIDE 5 MG/1
5-15 TABLET ORAL
Status: DISCONTINUED | OUTPATIENT
Start: 2021-03-30 | End: 2021-03-31 | Stop reason: HOSPADM

## 2021-03-30 RX ORDER — SODIUM CHLORIDE 0.9 % (FLUSH) 0.9 %
5-40 SYRINGE (ML) INJECTION AS NEEDED
Status: DISCONTINUED | OUTPATIENT
Start: 2021-03-30 | End: 2021-03-30 | Stop reason: HOSPADM

## 2021-03-30 RX ORDER — HYDROMORPHONE HYDROCHLORIDE 1 MG/ML
0.5 INJECTION, SOLUTION INTRAMUSCULAR; INTRAVENOUS; SUBCUTANEOUS
Status: DISCONTINUED | OUTPATIENT
Start: 2021-03-30 | End: 2021-03-30 | Stop reason: HOSPADM

## 2021-03-30 RX ORDER — FAMOTIDINE 20 MG/1
20 TABLET, FILM COATED ORAL ONCE
Status: COMPLETED | OUTPATIENT
Start: 2021-03-30 | End: 2021-03-30

## 2021-03-30 RX ORDER — LIDOCAINE HYDROCHLORIDE 10 MG/ML
0.1 INJECTION INFILTRATION; PERINEURAL AS NEEDED
Status: DISCONTINUED | OUTPATIENT
Start: 2021-03-30 | End: 2021-03-30 | Stop reason: HOSPADM

## 2021-03-30 RX ORDER — CEFAZOLIN SODIUM/WATER 2 G/20 ML
2 SYRINGE (ML) INTRAVENOUS EVERY 8 HOURS
Status: DISCONTINUED | OUTPATIENT
Start: 2021-03-30 | End: 2021-03-30

## 2021-03-30 RX ORDER — FLUTICASONE PROPIONATE 50 MCG
2 SPRAY, SUSPENSION (ML) NASAL DAILY
Status: DISCONTINUED | OUTPATIENT
Start: 2021-03-31 | End: 2021-03-31 | Stop reason: HOSPADM

## 2021-03-30 RX ORDER — SODIUM CHLORIDE 9 MG/ML
100 INJECTION, SOLUTION INTRAVENOUS CONTINUOUS
Status: DISCONTINUED | OUTPATIENT
Start: 2021-03-30 | End: 2021-03-31 | Stop reason: HOSPADM

## 2021-03-30 RX ORDER — FACIAL-BODY WIPES
10 EACH TOPICAL 2 TIMES DAILY
Status: DISCONTINUED | OUTPATIENT
Start: 2021-03-30 | End: 2021-03-31 | Stop reason: HOSPADM

## 2021-03-30 RX ORDER — CEFAZOLIN SODIUM/WATER 2 G/20 ML
2 SYRINGE (ML) INTRAVENOUS EVERY 12 HOURS
Status: COMPLETED | OUTPATIENT
Start: 2021-03-30 | End: 2021-03-31

## 2021-03-30 RX ORDER — NALOXONE HYDROCHLORIDE 0.4 MG/ML
0.4 INJECTION, SOLUTION INTRAMUSCULAR; INTRAVENOUS; SUBCUTANEOUS AS NEEDED
Status: DISCONTINUED | OUTPATIENT
Start: 2021-03-30 | End: 2021-03-31 | Stop reason: HOSPADM

## 2021-03-30 RX ORDER — ATORVASTATIN CALCIUM 20 MG/1
20 TABLET, FILM COATED ORAL
Status: DISCONTINUED | OUTPATIENT
Start: 2021-03-30 | End: 2021-03-31 | Stop reason: HOSPADM

## 2021-03-30 RX ORDER — CEFAZOLIN SODIUM/WATER 2 G/20 ML
2 SYRINGE (ML) INTRAVENOUS
Status: COMPLETED | OUTPATIENT
Start: 2021-03-30 | End: 2021-03-30

## 2021-03-30 RX ORDER — TAMSULOSIN HYDROCHLORIDE 0.4 MG/1
0.4 CAPSULE ORAL
Status: DISCONTINUED | OUTPATIENT
Start: 2021-03-30 | End: 2021-03-31 | Stop reason: HOSPADM

## 2021-03-30 RX ORDER — PROPOFOL 10 MG/ML
INJECTION, EMULSION INTRAVENOUS
Status: DISCONTINUED | OUTPATIENT
Start: 2021-03-30 | End: 2021-03-30 | Stop reason: HOSPADM

## 2021-03-30 RX ORDER — INSULIN LISPRO 100 [IU]/ML
15 INJECTION, SOLUTION INTRAVENOUS; SUBCUTANEOUS 2 TIMES DAILY WITH MEALS
Status: DISCONTINUED | OUTPATIENT
Start: 2021-03-31 | End: 2021-03-31 | Stop reason: HOSPADM

## 2021-03-30 RX ORDER — INSULIN GLARGINE 100 [IU]/ML
25 INJECTION, SOLUTION SUBCUTANEOUS 2 TIMES DAILY
Status: DISCONTINUED | OUTPATIENT
Start: 2021-03-30 | End: 2021-03-31 | Stop reason: HOSPADM

## 2021-03-30 RX ORDER — BUPIVACAINE HYDROCHLORIDE 7.5 MG/ML
INJECTION, SOLUTION INTRASPINAL
Status: DISCONTINUED | OUTPATIENT
Start: 2021-03-30 | End: 2021-03-30 | Stop reason: HOSPADM

## 2021-03-30 RX ORDER — ACETAMINOPHEN 325 MG/1
650 TABLET ORAL
Status: DISCONTINUED | OUTPATIENT
Start: 2021-03-30 | End: 2021-03-31 | Stop reason: HOSPADM

## 2021-03-30 RX ORDER — ACETAMINOPHEN 500 MG
1000 TABLET ORAL
Status: DISCONTINUED | OUTPATIENT
Start: 2021-03-30 | End: 2021-03-30 | Stop reason: HOSPADM

## 2021-03-30 RX ORDER — HYOSCYAMINE SULFATE 0.12 MG/1
0.12 TABLET SUBLINGUAL
Status: DISCONTINUED | OUTPATIENT
Start: 2021-03-30 | End: 2021-03-31 | Stop reason: HOSPADM

## 2021-03-30 RX ORDER — SODIUM CHLORIDE 0.9 % (FLUSH) 0.9 %
5-40 SYRINGE (ML) INJECTION EVERY 8 HOURS
Status: DISCONTINUED | OUTPATIENT
Start: 2021-03-30 | End: 2021-03-31 | Stop reason: HOSPADM

## 2021-03-30 RX ORDER — SODIUM CHLORIDE 9 MG/ML
50 INJECTION, SOLUTION INTRAVENOUS CONTINUOUS
Status: DISCONTINUED | OUTPATIENT
Start: 2021-03-30 | End: 2021-03-30 | Stop reason: HOSPADM

## 2021-03-30 RX ORDER — AMOXICILLIN 250 MG
1 CAPSULE ORAL 2 TIMES DAILY
Status: DISCONTINUED | OUTPATIENT
Start: 2021-03-30 | End: 2021-03-31 | Stop reason: HOSPADM

## 2021-03-30 RX ORDER — SUCRALFATE 1 G/1
1 TABLET ORAL
Status: DISCONTINUED | OUTPATIENT
Start: 2021-03-30 | End: 2021-03-31 | Stop reason: HOSPADM

## 2021-03-30 RX ORDER — PROPOFOL 10 MG/ML
INJECTION, EMULSION INTRAVENOUS AS NEEDED
Status: DISCONTINUED | OUTPATIENT
Start: 2021-03-30 | End: 2021-03-30 | Stop reason: HOSPADM

## 2021-03-30 RX ORDER — MORPHINE SULFATE 2 MG/ML
2 INJECTION, SOLUTION INTRAMUSCULAR; INTRAVENOUS
Status: DISCONTINUED | OUTPATIENT
Start: 2021-03-30 | End: 2021-03-31 | Stop reason: HOSPADM

## 2021-03-30 RX ORDER — SODIUM CHLORIDE 0.9 % (FLUSH) 0.9 %
5-40 SYRINGE (ML) INJECTION AS NEEDED
Status: DISCONTINUED | OUTPATIENT
Start: 2021-03-30 | End: 2021-03-31 | Stop reason: HOSPADM

## 2021-03-30 RX ORDER — ACETAMINOPHEN 500 MG
1000 TABLET ORAL ONCE
Status: COMPLETED | OUTPATIENT
Start: 2021-03-30 | End: 2021-03-30

## 2021-03-30 RX ORDER — EPHEDRINE SULFATE/0.9% NACL/PF 50 MG/5 ML
SYRINGE (ML) INTRAVENOUS AS NEEDED
Status: DISCONTINUED | OUTPATIENT
Start: 2021-03-30 | End: 2021-03-30 | Stop reason: HOSPADM

## 2021-03-30 RX ORDER — PANTOPRAZOLE SODIUM 40 MG/1
40 TABLET, DELAYED RELEASE ORAL 2 TIMES DAILY
Status: DISCONTINUED | OUTPATIENT
Start: 2021-03-30 | End: 2021-03-31 | Stop reason: HOSPADM

## 2021-03-30 RX ORDER — POLYETHYLENE GLYCOL 3350 17 G/17G
17 POWDER, FOR SOLUTION ORAL 2 TIMES DAILY
Status: DISCONTINUED | OUTPATIENT
Start: 2021-03-30 | End: 2021-03-31 | Stop reason: HOSPADM

## 2021-03-30 RX ORDER — ONDANSETRON 2 MG/ML
4 INJECTION INTRAMUSCULAR; INTRAVENOUS
Status: DISCONTINUED | OUTPATIENT
Start: 2021-03-30 | End: 2021-03-31 | Stop reason: HOSPADM

## 2021-03-30 RX ORDER — HYDROCODONE BITARTRATE AND ACETAMINOPHEN 5; 325 MG/1; MG/1
1 TABLET ORAL AS NEEDED
Status: DISCONTINUED | OUTPATIENT
Start: 2021-03-30 | End: 2021-03-30 | Stop reason: HOSPADM

## 2021-03-30 RX ADMIN — HYOSCYAMINE SULFATE 0.12 MG: 0.12 TABLET ORAL; SUBLINGUAL at 20:10

## 2021-03-30 RX ADMIN — SODIUM CHLORIDE 100 ML/HR: 900 INJECTION, SOLUTION INTRAVENOUS at 22:27

## 2021-03-30 RX ADMIN — PANTOPRAZOLE SODIUM 40 MG: 40 TABLET, DELAYED RELEASE ORAL at 22:28

## 2021-03-30 RX ADMIN — BUPIVACAINE HYDROCHLORIDE IN DEXTROSE 10 MG: 7.5 INJECTION, SOLUTION SUBARACHNOID at 12:56

## 2021-03-30 RX ADMIN — PHENYLEPHRINE HYDROCHLORIDE 100 MCG: 10 INJECTION INTRAVENOUS at 13:35

## 2021-03-30 RX ADMIN — Medication 10 ML: at 22:28

## 2021-03-30 RX ADMIN — PHENYLEPHRINE HYDROCHLORIDE 100 MCG: 10 INJECTION INTRAVENOUS at 13:11

## 2021-03-30 RX ADMIN — SENNOSIDES AND DOCUSATE SODIUM 1 TABLET: 8.6; 5 TABLET ORAL at 22:27

## 2021-03-30 RX ADMIN — CEFAZOLIN 2 G: 10 INJECTION, POWDER, FOR SOLUTION INTRAVENOUS at 22:31

## 2021-03-30 RX ADMIN — POLYETHYLENE GLYCOL 3350 17 G: 17 POWDER, FOR SOLUTION ORAL at 22:28

## 2021-03-30 RX ADMIN — TAMSULOSIN HYDROCHLORIDE 0.4 MG: 0.4 CAPSULE ORAL at 22:28

## 2021-03-30 RX ADMIN — PROPOFOL 30 MG: 10 INJECTION, EMULSION INTRAVENOUS at 12:58

## 2021-03-30 RX ADMIN — FAMOTIDINE 20 MG: 20 TABLET, FILM COATED ORAL at 11:19

## 2021-03-30 RX ADMIN — HYDROCODONE BITARTRATE AND ACETAMINOPHEN 1 TABLET: 5; 325 TABLET ORAL at 18:03

## 2021-03-30 RX ADMIN — ACETAMINOPHEN 1000 MG: 500 TABLET, FILM COATED ORAL at 11:19

## 2021-03-30 RX ADMIN — Medication 10 MG: at 13:16

## 2021-03-30 RX ADMIN — PROPOFOL 100 MCG/KG/MIN: 10 INJECTION, EMULSION INTRAVENOUS at 12:58

## 2021-03-30 RX ADMIN — ATORVASTATIN CALCIUM 20 MG: 20 TABLET, FILM COATED ORAL at 22:28

## 2021-03-30 RX ADMIN — CEFAZOLIN 2 G: 1 INJECTION, POWDER, FOR SOLUTION INTRAVENOUS at 12:59

## 2021-03-30 RX ADMIN — SUCRALFATE 1 G: 1 TABLET ORAL at 22:27

## 2021-03-30 RX ADMIN — PHENYLEPHRINE HYDROCHLORIDE 100 MCG: 10 INJECTION INTRAVENOUS at 13:32

## 2021-03-30 RX ADMIN — PHENYLEPHRINE HYDROCHLORIDE 100 MCG: 10 INJECTION INTRAVENOUS at 13:05

## 2021-03-30 RX ADMIN — INSULIN GLARGINE 12.5 UNITS: 100 INJECTION, SOLUTION SUBCUTANEOUS at 22:29

## 2021-03-30 RX ADMIN — SODIUM CHLORIDE, SODIUM LACTATE, POTASSIUM CHLORIDE, AND CALCIUM CHLORIDE 150 ML/HR: 600; 310; 30; 20 INJECTION, SOLUTION INTRAVENOUS at 11:19

## 2021-03-30 NOTE — H&P
[address] Urology H&P Note                                           03/30/21     Patient: Dipak Scanlon  MRN: [id number]    Admission Date:  3/30/2021, 0  Admission Diagnosis: Ureteral stone [N20.1]; BPH with urinary obstruction [N40.1, N13.8]    ASSESSMENT: 80 y.o.  male with R ureteral stone and BPH/LUTS who presents for surgery to today. PLAN:  -To OR for Cystoscopy, RIGHT Ureteroscopy, Laser Lithotripsy, RIGHT Ureteral Stent Exchange, Bipolar TURP  -Consented  -Antibiotics on call to OR  -NPO for surgery. __________________________________________________________________________________    HPI:     Dipak Scanlon is a 80 y.o. male with R ureteral stone and BPH/LUTS who presents for surgery to today. Today, he reports bothersome LUTS from the stent. No fevers/chills. Does report new constipation. Past Medical History:  Past Medical History:   Diagnosis Date    Abnormal CT of liver 03/02/2021    New heterogeneous liver mass measuring up to 6.4 cm.  Further evaluation required    Anemia     GI esophageal ulcer per EGD- was given 2 units prbc's 3/12/21, eliquis has been stopped    AR (allergic rhinitis) 8/3/2016    Arthritis     Benign paroxysmal positional vertigo     Bilateral impacted cerumen     BPH (benign prostatic hyperplasia)     CAD (coronary artery disease)     2003 stent placed; MI in 2003     Chronic kidney disease     Stage III kidney disease elevated creatinine BUN    Chronic otitis media     Chronic pain     herniated disc in lower back; ESIs in recent past    Chronic pancreatitis ([facility] [address]     Claustrophobia     Diverticulitis 8/3/2016    DNS (deviated nasal septum)     ETD (eustachian tube dysfunction)     GERD (gastroesophageal reflux disease)     controlled w/ prescription meds and OTC meds (pepcid)    H/O heart artery stent     S/P RCA stent by Dr. Aidee Holland in 2003    Heart disease 8/3/2016    High frequency hearing loss     History of bilateral inguinal hernia repair     History of gastroesophageal reflux (GERD)     controlled w/ med; no breakthrough GERD    Hx of exercise stress test 02/22/2019    EF on Stress test 71%    Hypercholesterolemia     takes statin and fish oil    Hyperlipidemia     takes statin and fish oil    Hypertrophy of both inferior nasal turbinates     Kidney stone     Liver disease     going for MRI; recent CT showed mass on liver    MHL (mixed hearing loss)     NO (nasal obstruction)     Otitis media, nonsuppurative     SNHL (sensorineural hearing loss) 8/3/2016    Squamous cell carcinoma, arm     left forearm near wrist; several other sites as well    Type 2 diabetes mellitus (HCC)     insulin reliant/AVG FBS: 100-120/s.s of hypoglycemia at 70/last A1c:7.5    Unspecified sleep apnea     no cpap; patient states he lost weight and does not have TARIQ anymore    Vertigo        Past Surgical History:  Past Surgical History:   Procedure Laterality Date    HX APPENDECTOMY      HX CATARACT REMOVAL Bilateral     HX COLONOSCOPY      HX CORONARY STENT PLACEMENT  2003    stent RCA 2003    HX ENDOSCOPY  03/11/2021    HX ERCP  12/2015    ENDOSCOPIC RETROGRADE CHOLANGIOPANCREATOGRAPHY (N/A Upper GI Region) ENDOSCOPIC SPHINCTEROTOMY (N/A Upper GI Region) ENDOSCOPIC STONE EXTRACTION/BALLOON SWEEP (N/A Upper GI Region)    HX HERNIA REPAIR Bilateral 1980s    HX HERNIA REPAIR Left 2014    LIH with mesh    HX MASTOIDECTOMY  1996    right modified canal wall down    HX ORTHOPAEDIC Right 1/14/03    Dupuytrens contracture release    HX OTHER SURGICAL      skin lesion; local excision: SCC    HX TURP      HX TYMPANOSTOMY Right     tube right ear    HX VITRECTOMY      [place] CYSTOSCOPY,INSERT URETERAL STENT Right 03/05/2021       Medications:  No current facility-administered medications on file prior to encounter.       Current Outpatient Medications on File Prior to Encounter   Medication Sig Dispense Refill    pantoprazole (PROTONIX) 40 mg tablet Take 1 Tab by mouth two (2) times a day. 60 Tab 1    sucralfate (CARAFATE) 1 gram tablet Take 1 Tab by mouth Before breakfast, lunch, dinner and at bedtime. 120 Tab 1    lipase-protease-amylase (Creon) 24,000-76,000 -120,000 unit capsule Take 2 Caps by mouth three (3) times daily (with meals). 1-2 cap TID with meals and 1 cap with each snack      insulin glargine (Lantus U-100 Insulin) 100 unit/mL injection 25 Units by SubCUTAneous route two (2) times a day. Adjusted to meal CHO any range between 15-25 units      atorvastatin (Lipitor) 20 mg tablet Take 20 mg by mouth nightly.  fluticasone propionate (FLONASE) 50 mcg/actuation nasal spray Use 2 sprays in each nostril daily 1 Bottle 5    insulin aspart (NOVOLOG FLEXPEN U-100 INSULIN SC) 15 Units by SubCUTAneous route Before breakfast, lunch, and dinner. Adjusted to meal CHO any range between 5-15 units      tamsulosin (FLOMAX) 0.4 mg capsule TAKE 1 CAPSULE BY MOUTH EVERYDAY AT BEDTIME      cholecalciferol (VITAMIN D3) 1,000 unit cap Take  by mouth daily.  FISH OIL 1,000 mg Cap Take 2 Caps by mouth daily. Am.  1 tsp liquid- concentrated  Last dose 5/13/14         Allergies:   Allergies   Allergen Reactions    Iohexol Anaphylaxis    Other Medication Anaphylaxis     Omnipaque Rediflo 240 (contrast)    Atorvastatin Myalgia     Currently tolerating and currently taking    Cipro [Ciprofloxacin] Hives    Codeine Unknown (comments)     Makes patient hyperactive    Iodinated Contrast Media Anaphylaxis    Penicillins Rash    Valdecoxib Hives and Rash     Vioxx       Social History:  Social History     Socioeconomic History    Marital status:      Spouse name: Not on file    Number of children: Not on file    Years of education: Not on file    Highest education level: Not on file   Occupational History    Not on file   Social Needs    Financial resource strain: Not on file   [place] insecurity     Worry: Not on file     Inability: Not on file    Transportation needs     Medical: Not on file     Non-medical: Not on file   Tobacco Use    Smoking status: Never Smoker    Smokeless tobacco: Never Used   Substance and Sexual Activity    Alcohol use: No    Drug use: No    Sexual activity: Not on file   Lifestyle    Physical activity     Days per week: Not on file     Minutes per session: Not on file    Stress: Not on file   Relationships    Social connections     Talks on phone: Not on file     Gets together: Not on file     Attends Taoism service: Not on file     Active member of club or organization: Not on file     Attends meetings of clubs or organizations: Not on file     Relationship status: Not on file    Intimate partner violence     Fear of current or ex partner: Not on file     Emotionally abused: Not on file     Physically abused: Not on file     Forced sexual activity: Not on file   Other Topics Concern    Not on file   Social History Narrative    Not on file       Family History:  Family History   Problem Relation Age of Onset    Cancer Maternal Grandmother         colon    Heart Disease Maternal Grandfather        ROS:  Review of Systems - General ROS: negative for - chills, fatigue or fever  Respiratory ROS: no cough, shortness of breath, or wheezing  Cardiovascular ROS: no chest pain or dyspnea on exertion  Gastrointestinal ROS: no abdominal pain, change in bowel habits, or black or bloody stools  Genito-Urinary ROS: no dysuria, trouble voiding, or hematuria  Musculoskeletal ROS: negative  negative for - muscular weakness    Vitals:  Visit Vitals  /65 (BP 1 Location: Right arm)   Pulse 87   Temp 98.2 °F (36.8 °C)   Resp 18   Ht 5' 9\" (1.753 m)   Wt 182 lb (82.6 kg)   SpO2 97%   BMI 26.88 kg/m²       Intake/Output:  No intake or output data in the 24 hours ending 03/30/21 1211     Physical Exam:   Visit Vitals  /65 (BP 1 Location: Right arm)   Pulse 87 Temp 98.2 °F (36.8 °C)   Resp 18   Ht 5' 9\" (1.753 m)   Wt 182 lb (82.6 kg)   SpO2 97%   BMI 26.88 kg/m²        GENERAL: No acute distress, Awake, Alert, Oriented X 3  CARDIAC: regular rate and rhythm  CHEST AND LUNG: Easy work of breathing  ABDOMEN: soft, non tender, non-distended  SKIN: No rash, no erythema, no lacerations or abrasions, no ecchymosis  NEUROLOGIC: cranial nerves 2-12 grossly intact     Lab/Radiology/Other Diagnostic Tests:  Recent Labs     03/28/21  0126   HGB 10.1*   HCT 32.1*   WBC 6.5      K 3.7   *   CO2 21   BUN 24*   *   CA 8.9   MG 2.0   AST 15   ALT 22         Colton Doty M.D.     [facility]  [address], [facility] [address]  Phone: [phone]  Fax: [phone]

## 2021-03-30 NOTE — PROGRESS NOTES
Report received from [place], [facility] for end of shift report

## 2021-03-30 NOTE — PROGRESS NOTES
TRANSFER - IN REPORT:    Verbal report received from [facility] (name) on Steffany Louise  being received from PACU (unit) for routine progression of care      Report consisted of patients Situation, Background, Assessment and   Recommendations(SBAR). Information from the following report(s) Procedure Summary was reviewed with the receiving nurse. Opportunity for questions and clarification was provided. Assessment completed upon patients arrival to unit and care assumed.

## 2021-03-30 NOTE — ANESTHESIA PREPROCEDURE EVALUATION
Anesthetic History   No history of anesthetic complications            Review of Systems / Medical History  Patient summary reviewed, nursing notes reviewed and pertinent labs reviewed    Pulmonary        Sleep apnea: No treatment           Neuro/Psych   Within defined limits           Cardiovascular    Hypertension          CAD (stent 2008), CABG/stent, cardiac stents (2003) and hyperlipidemia    Exercise tolerance: <4 METS  Comments: Normal stress test 2/19. GI/Hepatic/Renal     GERD (chronic pancreatitis)    Renal disease: CRI and stones  Liver disease (liver mass, ?malig.)    Comments: Gi bleed, nausea and vomiting, will require ga.  Endo/Other    Diabetes: well controlled, type 2, using insulin    Arthritis     Other Findings              Physical Exam    Airway  Mallampati: III  TM Distance: < 4 cm  Neck ROM: normal range of motion   Mouth opening: Diminished (comment)     Cardiovascular  Regular rate and rhythm,  S1 and S2 normal,  no murmur, click, rub, or gallop  Rhythm: regular  Rate: normal         Dental  No notable dental hx       Pulmonary  Breath sounds clear to auscultation               Abdominal         Other Findings            Anesthetic Plan    ASA: 3  Anesthesia type: spinal            Anesthetic plan and risks discussed with: Patient

## 2021-03-30 NOTE — PROGRESS NOTES
TRANSFER - OUT REPORT:    Verbal report given to Karen Ld on Alysha Crockett  being transferred to [address] [place] [address] for routine post - op       Report consisted of patients Situation, Background, Assessment and   Recommendations(SBAR). Information from the following report(s) SBAR, OR Summary, Procedure Summary, Intake/Output, MAR and Cardiac Rhythm NSR was reviewed with the receiving nurse. Lines:   Peripheral IV 03/30/21 Left Hand (Active)   Site Assessment Clean, dry, & intact 03/30/21 1843   Phlebitis Assessment 0 03/30/21 1843   Infiltration Assessment 0 03/30/21 1843   Dressing Status Clean, dry, & intact 03/30/21 1843   Dressing Type Transparent;Tape 03/30/21 1843   Hub Color/Line Status Pink; Infusing 03/30/21 1843   Alcohol Cap Used No 03/30/21 1843        Opportunity for questions and clarification was provided. Patient transported with:   Tech  Patient's Belongings    Patient's wife, Mick Staton, called and updated after 4 digit security code verified.

## 2021-03-30 NOTE — PROGRESS NOTES
Spiritual Care visit. Initial Visit, Pre Surgery Consult. Visit and prayer before patient goes to surgery.     Visit by Rome Gardner M.Ed., Th.B. ,Staff

## 2021-03-30 NOTE — ANESTHESIA PROCEDURE NOTES
Spinal Block    Start time: 3/30/2021 12:50 PM  End time: 3/30/2021 12:56 PM  Performed by: Suyapa Mancini MD  Authorized by: Suyapa Mancini MD     Pre-procedure:   Indications: primary anesthetic  Preanesthetic Checklist: patient identified, risks and benefits discussed, anesthesia consent, patient being monitored and timeout performed    Timeout Time: 12:50          Spinal Block:   Patient Position:  Seated  Prep Region:  Lumbar  Prep: chlorhexidine      Location:  L3-4  Technique:  Single shot    Local Dose (mL):  3    Needle:   Needle Type:  Pencan  Needle Gauge:  25 G  Attempts:  1      Events: CSF confirmed, no blood with aspiration and no paresthesia        Assessment:  Insertion:  Uncomplicated  Patient tolerance:  Patient tolerated the procedure well with no immediate complications  All needles out intact, procedure tolerated well without problems

## 2021-03-30 NOTE — BRIEF OP NOTE
Brief Postoperative Note    Patient: Paty Ha  YOB: 1933  MRN: [id number]    Date of Procedure: 3/30/2021     Pre-Op Diagnosis: Ureteral stone [N20.1]  BPH with urinary obstruction [N40.1, N13.8]    Post-Op Diagnosis: Same as preoperative diagnosis. Procedure(s):  CYSTOSCOPY RIGHT URETEROSCOPY LASER LITHO  RIGHT STENT EXCHANGE  TRANSURETHRAL RESECTION OF PROSTATE WITH BIPOLAR    Surgeon(s): Conner Roman MD    Surgical Assistant: None    Anesthesia: General     Estimated Blood Loss (mL): Minimal    Complications: None    Specimens:   ID Type Source Tests Collected by Time Destination   1 : prostate chips Preservative Prostate  Conner Roman MD 3/30/2021 6478 Pathology        Implants:   Implant Name Type Inv. Item Serial No.  Lot No. LRB No. Used Action   STENT URET 6F 26CM -- PERCUFLEX PLUS [id number] - [id number]  STENT URET 6F 26CM -- [address] [place] [address] [id number]  [employer]_ [id number]  1 Implanted       Drains: 24 3-way mccarthy catheter with 30 cc sterile water in balloon. Findings: Right Distal Ureteral stone fragmented and removed. REgrowth of median lobe causing ball-valving of prostate into bladder outlet.      Electronically Signed by Coretta Mars MD on 3/30/2021 at 2:15 PM

## 2021-03-30 NOTE — ANESTHESIA POSTPROCEDURE EVALUATION
Procedure(s):  CYSTOSCOPY RIGHT URETEROSCOPY LASER LITHO  RIGHT STENT EXCHANGE  TRANSURETHRAL RESECTION OF PROSTATE WITH BIPOLAR.    spinal    Anesthesia Post Evaluation      Multimodal analgesia: multimodal analgesia used between 6 hours prior to anesthesia start to PACU discharge  Patient location during evaluation: bedside  Patient participation: complete - patient participated  Level of consciousness: awake and alert  Pain management: adequate  Airway patency: patent  Anesthetic complications: no  Cardiovascular status: hemodynamically stable  Respiratory status: spontaneous ventilation  Hydration status: euvolemic  Comments: Patient stable and may discharge at this time.   Post anesthesia nausea and vomiting:  none  Final Post Anesthesia Temperature Assessment:  Normothermia (36.0-37.5 degrees C)      INITIAL Post-op Vital signs:   Vitals Value Taken Time   /61 03/30/21 1454   Temp 36.3 °C (97.4 °F) 03/30/21 1454   Pulse 57 03/30/21 1454   Resp 16 03/30/21 1454   SpO2 98 % 03/30/21 1454

## 2021-03-31 VITALS
BODY MASS INDEX: 26.96 KG/M2 | WEIGHT: 182 LBS | SYSTOLIC BLOOD PRESSURE: 121 MMHG | TEMPERATURE: 98.1 F | DIASTOLIC BLOOD PRESSURE: 68 MMHG | HEART RATE: 63 BPM | RESPIRATION RATE: 18 BRPM | HEIGHT: 69 IN | OXYGEN SATURATION: 94 %

## 2021-03-31 LAB
ANION GAP SERPL CALC-SCNC: 4 MMOL/L (ref 7–16)
BUN SERPL-MCNC: 25 MG/DL (ref 8–23)
CALCIUM SERPL-MCNC: 8.6 MG/DL (ref 8.3–10.4)
CHLORIDE SERPL-SCNC: 113 MMOL/L (ref 98–107)
CO2 SERPL-SCNC: 26 MMOL/L (ref 21–32)
CREAT SERPL-MCNC: 1.85 MG/DL (ref 0.8–1.5)
ERYTHROCYTE [DISTWIDTH] IN BLOOD BY AUTOMATED COUNT: 15.1 % (ref 11.9–14.6)
GLUCOSE BLD STRIP.AUTO-MCNC: 136 MG/DL (ref 65–100)
GLUCOSE BLD STRIP.AUTO-MCNC: 75 MG/DL (ref 65–100)
GLUCOSE SERPL-MCNC: 66 MG/DL (ref 65–100)
HCT VFR BLD AUTO: 32.4 % (ref 41.1–50.3)
HGB BLD-MCNC: 9.8 G/DL (ref 13.6–17.2)
MCH RBC QN AUTO: 28 PG (ref 26.1–32.9)
MCHC RBC AUTO-ENTMCNC: 30.2 G/DL (ref 31.4–35)
MCV RBC AUTO: 92.6 FL (ref 79.6–97.8)
NRBC # BLD: 0 K/UL (ref 0–0.2)
PLATELET # BLD AUTO: 176 K/UL (ref 150–450)
PMV BLD AUTO: 10.8 FL (ref 9.4–12.3)
POTASSIUM SERPL-SCNC: 4.3 MMOL/L (ref 3.5–5.1)
RBC # BLD AUTO: 3.5 M/UL (ref 4.23–5.6)
SERVICE CMNT-IMP: ABNORMAL
SERVICE CMNT-IMP: NORMAL
SODIUM SERPL-SCNC: 143 MMOL/L (ref 136–145)
WBC # BLD AUTO: 6.7 K/UL (ref 4.3–11.1)

## 2021-03-31 PROCEDURE — 74011250636 HC RX REV CODE- 250/636: Performed by: UROLOGY

## 2021-03-31 PROCEDURE — 85027 COMPLETE CBC AUTOMATED: CPT

## 2021-03-31 PROCEDURE — 99024 POSTOP FOLLOW-UP VISIT: CPT | Performed by: UROLOGY

## 2021-03-31 PROCEDURE — 74011250637 HC RX REV CODE- 250/637: Performed by: UROLOGY

## 2021-03-31 PROCEDURE — 2709999900 HC NON-CHARGEABLE SUPPLY

## 2021-03-31 PROCEDURE — 82962 GLUCOSE BLOOD TEST: CPT

## 2021-03-31 PROCEDURE — 36415 COLL VENOUS BLD VENIPUNCTURE: CPT

## 2021-03-31 PROCEDURE — 80048 BASIC METABOLIC PNL TOTAL CA: CPT

## 2021-03-31 PROCEDURE — 74011000250 HC RX REV CODE- 250: Performed by: UROLOGY

## 2021-03-31 RX ORDER — HYDROCODONE BITARTRATE AND ACETAMINOPHEN 5; 325 MG/1; MG/1
1 TABLET ORAL
Qty: 20 TAB | Refills: 0 | Status: SHIPPED | OUTPATIENT
Start: 2021-03-31 | End: 2021-04-07

## 2021-03-31 RX ADMIN — SUCRALFATE 1 G: 1 TABLET ORAL at 05:22

## 2021-03-31 RX ADMIN — SUCRALFATE 1 G: 1 TABLET ORAL at 11:16

## 2021-03-31 RX ADMIN — PANTOPRAZOLE SODIUM 40 MG: 40 TABLET, DELAYED RELEASE ORAL at 05:21

## 2021-03-31 RX ADMIN — PANCRELIPASE LIPASE, PANCRELIPASE PROTEASE, PANCRELIPASE AMYLASE 2 CAPSULE: 20000; 63000; 84000 CAPSULE, DELAYED RELEASE ORAL at 11:16

## 2021-03-31 RX ADMIN — CEFAZOLIN 2 G: 10 INJECTION, POWDER, FOR SOLUTION INTRAVENOUS at 11:00

## 2021-03-31 NOTE — PROGRESS NOTES
Patient to discharge home this day. No needs identified at this time. Family to transport the patient home. Please consult or notify CM if any needs shall arise. CM remains available. Care Management Interventions  PCP Verified by CM: Yes  Mode of Transport at Discharge: Other (see comment)(Family. )  Transition of Care Consult (CM Consult): Discharge Planning  Discharge Durable Medical Equipment: No  Physical Therapy Consult: No  Occupational Therapy Consult: No  Speech Therapy Consult: No  Current Support Network: Lives with Spouse, Own Home  Confirm Follow Up Transport: Self  The Plan for Transition of Care is Related to the Following Treatment Goals : Return to Baseline. The Patient and/or Patient Representative was Provided with a Choice of Provider and Agrees with the Discharge Plan?: Yes  Name of the Patient Representative Who was Provided with a Choice of Provider and Agrees with the Discharge Plan: Patient.     Resource Information Provided?: No  Discharge Location  Discharge Placement: Home

## 2021-03-31 NOTE — PROGRESS NOTES
03/30/21 2109   Dual Skin Pressure Injury Assessment   Dual Skin Pressure Injury Assessment WDL   Second Care Provider (Based on Facility Policy) Keke BRADFORD    Skin Integumentary   Skin Integumentary (WDL) WDL    Pressure  Injury Documentation No Pressure Injury Noted-Pressure Ulcer Prevention Initiated   Skin Color Appropriate for ethnicity   Skin Condition/Temp Warm;Dry   Skin Integrity Intact; Other (comment); Incision (comment)  (age spots; CBI )   Turgor Epidermis thin w/ loss of subcut tissue   Wound Prevention and Protection Methods   Orientation of Wound Prevention Posterior   Location of Wound Prevention Sacrum/Coccyx   Dressing Present  No   Wound Offloading (Prevention Methods) Bed, pressure redistribution/air; Turning;Repositioning;Pillows     No skin breakdown noted. 3 way mccarthy in place and CBI going. Some age spots noted on skin. Positioned for comfort and oriented to room. Will continue to monitor.

## 2021-03-31 NOTE — OP NOTES
[address] [place] [address]  OPERATIVE REPORT    Name:  Joselin Sims  MR#:  [id number]  :  1933  ACCOUNT #:  [de-identified]  DATE OF SERVICE:  2021    PREOPERATIVE DIAGNOSES:  Right ureter stone and benign prostatic hyperplasia with obstruction. POSTOPERATIVE DIAGNOSES:  Right ureter stone and benign prostatic hyperplasia with obstruction. PROCEDURE PERFORMED:  Cystoscopy, right ureteroscopy, laser lithotripsy, right ureteral stent exchange, bipolar transurethral resection of prostate. SURGEON:  Dylon Cortes MD    ASSISTANT:  None. ANESTHESIA:  General.    COMPLICATIONS:  None. SPECIMENS REMOVED:  Prostate chips for pathology. IMPLANTS:  A 6 x 26 double-J right ureteral stent with strings. DRAINS:  A 24 three-way Valencia catheter, 30 mL sterile water in balloon. ESTIMATED BLOOD LOSS:  Minimal.    FINDINGS:  Right distal ureteral stone fragmented and removed. Regrowth of the median lobe of the prostate causing a ball valve effect into the bladder outlet and significant LUTS. INDICATIONS FOR OPERATIVE PROCEDURE:  The patient is an 80-year-old male with a history of urosepsis secondary to an obstructing right proximal ureteral stone. He underwent emergent stent placement back in 2021 earlier this month and has subsequently recovered from the sepsis. He also reports bothersome lower urinary tract symptoms that make it quite difficult for him to urinate and opted to proceed with a bipolar TURP at the same time. He had a history of prior TURP 20 years ago but has known significant regrowth causing a ball valving effect of the median lobe into the bladder outlet. After risk-benefit discussion of all the above was had, he opted to proceed. PROCEDURE:  After informed consent was obtained, the correct patient identified in the preoperative holding area, he was taken back to the operating suite and placed on the table in the supine position.   Time-out was performed confirming the correct patient and planned procedure. He received 2 g of IV Ancef prior to the smooth induction of general endotracheal anesthesia. He was moved into the dorsal lithotomy position, prepped and draped in the usual sterile fashion. I began the case by inserting a 22-Persian rigid cystoscope with a 30-degree lens into the urethra and advanced it into the patient's bladder. Pancystoscopy revealed a ball valving median lobe. The bladder was otherwise unremarkable except for the right ureteral stent which was extruding from the right ureteral orifice. I cannulated the orifice with a sensor wire under direct vision and passed this under fluoroscopic guidance alongside the stent into the right renal pelvis. Once this was in position, I backloaded the scope off the wire, reinserted the scope and used a flexible stent grasper to grasp the end of the stent and remove the stent completely intact, leaving the wire in place. At this point, I switched to pressure bag and my semi-rigid ureteroscope. I inserted this under direct vision into the patient's ureter. The proximal ureteral stone had migrated down to about the level of the mid ureter with the stent. It was too large to basket and remove. I therefore used a 200-micron laser fiber with the settings of 0.6 joules and 6 Hz. I lasered the stone into passable pieces. Tricep basket was used to grab the pieces and deposit them in the bladder. Once I had cleared the ureter of stone, I inspected the ureter all the way from the renal pelvis down to the UVJ. It was completely clear of stone; however given the need to laser and basket, I decided to replace the stent to allow the kidney to drain and prevent postop edema from causing the ureter to swell shut. I left the sensor wire in place. I removed the semi-rigid ureteroscope.   I loaded the wire onto the rigid cystoscope and then passed a 6 x 26 double-J right ureteral stent with a small portion of the string attached over the wire under fluoroscopic guidance into the right renal pelvis. Once the stent was in position, I pulled the wire noting good curl in the right renal pelvis as well as the bladder. At this point, I removed the cystoscope and switched to my 26-Liberian bipolar resectoscope. I used the visual obturator to place a resectoscope sheath under direct vision. I then switched out the resectoscope loop. A 30-degree, medium-sized bipolar resectoscope loop was used to resect the median lobe and create a wide open channel and remove the ball valving effect. Care was taken to avoid injury to the ureteral orifices, which were visualized throughout the resection process. The verumontanum was used as my distal marker. I took great care not to pass the veru in order to prevent injury to the external urethral sphincter. He did have a small amount of residual apical tissue that I resected on the left side as well. I then cauterized the resection bed to achieve hemostasis, evacuated the chips, and then observed the prostatic urethra with irrigation off. The urethra was wide open and I could see into the bladder without any irrigation. No significant bleeding was noted in the case. I removed the resectoscope and then inserted a 24 three-way Valencia catheter. This passed easily into the bladder. I inflated the balloon with 30 mL of sterile water. I connected the catheter to continuous bladder irrigation. The patient was then awoken from anesthesia and transferred to the PACU in stable condition. He tolerated the procedure well. There were no complications. All counts were correct at the end of the procedure.         Prosper Herr MD      [id number]  D:  03/30/2021 14:36  T:  03/30/2021 22:46  JOB #:  [id number]

## 2021-03-31 NOTE — PROGRESS NOTES
CM met with patient to complete assessment. Patient presented alert and oriented. Demographic information verified by the patient. The patient lives with his spouse Robbin Jaeger [phone] in a one story home with no steps at the entrance. Patient confirmed he is independent with completing all ADL's and confirmed no DME. Patient drives. Diabetes: YES  Patient obtains his prescription medications from [facility] on [address] in [place]. Patient voiced no difficulty with obtaining medications in the community. Discharge planning: PT/OT has not been consulted. Patient denies any history of [place] services or REHAB. Patient voiced no needs at this time and anticipates to return home at discharge. Please consult or notify CM if any needs shall arise. CM continues to monitor. Care Management Interventions  PCP Verified by CM: Yes  Mode of Transport at Discharge: Other (see comment)(Family. )  Transition of Care Consult (CM Consult): Discharge Planning  Discharge Durable Medical Equipment: No  Physical Therapy Consult: No  Occupational Therapy Consult: No  Speech Therapy Consult: No  Current Support Network: Lives with Spouse, Own Home  Confirm Follow Up Transport: Self  The Plan for Transition of Care is Related to the Following Treatment Goals : Return to Baseline. The Patient and/or Patient Representative was Provided with a Choice of Provider and Agrees with the Discharge Plan?: Yes  Name of the Patient Representative Who was Provided with a Choice of Provider and Agrees with the Discharge Plan: Patient.     Resource Information Provided?: No  Discharge Location  Discharge Placement: Home

## 2021-03-31 NOTE — PROGRESS NOTES
Urology Progress Note    Admit Date: 3/30/2021    Subjective:     Patient has no new complaints. Urine clear off CBI. Valencia removed. Pain controlled. Objective:     Patient Vitals for the past 8 hrs:   BP Temp Pulse Resp SpO2   03/31/21 1124 121/68 98.1 °F (36.7 °C) 63 18 94 %   03/31/21 0736 126/61 98.1 °F (36.7 °C) 72 18 93 %     03/31 0701 - 03/31 1900  In: -   Out: 750 [Urine:750]  03/29 1901 - 03/31 0700  In: 57981 [P.O.:300;  I.V.:1300]  Out: 21000 [Urine:95850]    Physical Exam:   Visit Vitals  /68 (BP 1 Location: Left arm, BP Patient Position: Reclining)   Pulse 63   Temp 98.1 °F (36.7 °C)   Resp 18   Ht 5' 9\" (1.753 m)   Wt 182 lb (82.6 kg)   SpO2 94%   BMI 26.88 kg/m²        GENERAL: No acute distress, Awake, Alert, Oriented X 3, Gait normal  CARDIAC: regular rate and rhythm  CHEST AND LUNG: Easy work of breathing, clear to auscultation bilaterally, no cyanosis  ABDOMEN: soft, non tender, non-distended, positive bowel sounds, no organomegaly, no palpable masses, no guarding, no rebound tenderness  SKIN: No rash, no erythema, no lacerations or abrasions, no ecchymosis  NEUROLOGIC: cranial nerves 2-12 grossly intact           Data Review   Recent Results (from the past 24 hour(s))   GLUCOSE, POC    Collection Time: 03/30/21  2:36 PM   Result Value Ref Range    Glucose (POC) 93 65 - 100 mg/dL    Performed by [facility]    CBC W/O DIFF    Collection Time: 03/30/21  2:41 PM   Result Value Ref Range    WBC 9.2 4.3 - 11.1 K/uL    RBC 3.63 (L) 4.23 - 5.6 M/uL    HGB 10.2 (L) 13.6 - 17.2 g/dL    HCT 33.2 (L) 41.1 - 50.3 %    MCV 91.5 79.6 - 97.8 FL    MCH 28.1 26.1 - 32.9 PG    MCHC 30.7 (L) 31.4 - 35.0 g/dL    RDW 14.9 (H) 11.9 - 14.6 %    PLATELET 736 975 - 395 K/uL    MPV 10.4 9.4 - 12.3 FL    ABSOLUTE NRBC 0.00 0.0 - 0.2 K/uL   METABOLIC PANEL, BASIC    Collection Time: 03/30/21  2:41 PM   Result Value Ref Range    Sodium 142 136 - 145 mmol/L    Potassium 4.8 3.5 - 5.1 mmol/L    Chloride 112 (H) 98 - 107 mmol/L    CO2 28 21 - 32 mmol/L    Anion gap 2 (L) 7 - 16 mmol/L    Glucose 89 65 - 100 mg/dL    BUN 27 (H) 8 - 23 MG/DL    Creatinine 2.15 (H) 0.8 - 1.5 MG/DL    GFR est AA 37 (L) >60 ml/min/1.73m2    GFR est non-AA 31 (L) >60 ml/min/1.73m2    Calcium 8.5 8.3 - 10.4 MG/DL   HEMOGLOBIN A1C WITH EAG    Collection Time: 03/30/21  2:41 PM   Result Value Ref Range    Hemoglobin A1c 6.6 (H) 4.20 - 6.30 %    Est. average glucose 143 mg/dL   GLUCOSE, POC    Collection Time: 03/30/21  9:22 PM   Result Value Ref Range    Glucose (POC) 121 (H) 65 - 100 mg/dL    Performed by [facility]    METABOLIC PANEL, BASIC    Collection Time: 03/31/21  4:55 AM   Result Value Ref Range    Sodium 143 136 - 145 mmol/L    Potassium 4.3 3.5 - 5.1 mmol/L    Chloride 113 (H) 98 - 107 mmol/L    CO2 26 21 - 32 mmol/L    Anion gap 4 (L) 7 - 16 mmol/L    Glucose 66 65 - 100 mg/dL    BUN 25 (H) 8 - 23 MG/DL    Creatinine 1.85 (H) 0.8 - 1.5 MG/DL    GFR est AA 45 (L) >60 ml/min/1.73m2    GFR est non-AA 37 (L) >60 ml/min/1.73m2    Calcium 8.6 8.3 - 10.4 MG/DL   CBC W/O DIFF    Collection Time: 03/31/21  4:55 AM   Result Value Ref Range    WBC 6.7 4.3 - 11.1 K/uL    RBC 3.50 (L) 4.23 - 5.6 M/uL    HGB 9.8 (L) 13.6 - 17.2 g/dL    HCT 32.4 (L) 41.1 - 50.3 %    MCV 92.6 79.6 - 97.8 FL    MCH 28.0 26.1 - 32.9 PG    MCHC 30.2 (L) 31.4 - 35.0 g/dL    RDW 15.1 (H) 11.9 - 14.6 %    PLATELET 090 333 - 543 K/uL    MPV 10.8 9.4 - 12.3 FL    ABSOLUTE NRBC 0.00 0.0 - 0.2 K/uL   GLUCOSE, POC    Collection Time: 03/31/21  7:38 AM   Result Value Ref Range    Glucose (POC) 75 65 - 100 mg/dL    Performed by [facility]    GLUCOSE, POC    Collection Time: 03/31/21 11:27 AM   Result Value Ref Range    Glucose (POC) 136 (H) 65 - 100 mg/dL    Performed by [facility]            Assessment:     Active Problems:    BPH (benign prostatic hyperplasia) (3/30/2021)      POD 1 s/p R URS/TURP.   Doing well    Plan:     -Regular diet  -PO pain control  -D/C once voids. Will arrange follow up for cysto/stent pull in clinic. Colton Bucio M.D.     [facility]  [facility], [facility] [address]  Phone: [phone]  Fax: [phone]

## 2021-03-31 NOTE — PROGRESS NOTES
Pt rounded on hourly and PRN. No complaints of pain, nausea,vomiting. Pt had large BM x1 this shift. CBI ran without issue this shift. Urine is pale pink and clear. Bed is low, locked, call bell within reach. All needs met this shift. Will continue to monitor until next RN comes on. Date 03/30/21 0700 - 03/31/21 0659 03/31/21 0700 - 04/01/21 0659   Shift [id number] 1900-0659 24 Hour Total 7399-8485 4009-5286 24 Hour Total   INTAKE   P.O.  300 300        P. O.  300 300      I. V.(mL/kg/hr) 1300(1.3)  1300        Volume (lactated Ringers infusion) 1300  1300      Other 2750 90410 83536        Irrigation Volume Input (mL) (Urinary Catheter 03/30/21 Valencia) 2750 36486 03382      Shift Total(mL/kg) 4050(49.1) 74123(681.0) 64086(228.2)      OUTPUT   Urine(mL/kg/hr) 3750(3.8) 52006 93207        Urine Output 0  0        Urine Output (mL) (Urinary Catheter 03/30/21 Valencia) 3750 57436 08652      Stool           Stool Occurrence(s)  1 x 1 x      Blood 0  0        Estimated Blood Loss 0  0      Shift Total(mL/kg) 3750(45.4) 39441(330) 03974(676.1)       -1950 -1650      Weight (kg) 82.6 82.6 82.6 82.6 82.6 82.6

## 2021-03-31 NOTE — DISCHARGE SUMMARY
Physician Discharge Summary    Name: [facility] Record Number: [id number]       Account Number:  [de-identified]  YOB: 1933                         Age:  80 y.o. Admit date:  3/30/2021                    Discharge date:  3/31/2021    Attending Physician:  Thien Dyson            Service: SURGERY    Physician Summary completed by: Rocael Henderson. Shaun Renteria MD    Reason for hospitalization: BPH/R Ureter Stone    Significant PMH:   Past Medical History:   Diagnosis Date    Abnormal CT of liver 03/02/2021    New heterogeneous liver mass measuring up to 6.4 cm.  Further evaluation required    Anemia     GI esophageal ulcer per EGD- was given 2 units prbc's 3/12/21, eliquis has been stopped    AR (allergic rhinitis) 8/3/2016    Arthritis     Benign paroxysmal positional vertigo     Bilateral impacted cerumen     BPH (benign prostatic hyperplasia)     CAD (coronary artery disease)     2003 stent placed; MI in 2003     Chronic kidney disease     Stage III kidney disease elevated creatinine BUN    Chronic otitis media     Chronic pain     herniated disc in lower back; ESIs in recent past    Chronic pancreatitis ([address]     Claustrophobia     Diverticulitis 8/3/2016    DNS (deviated nasal septum)     ETD (eustachian tube dysfunction)     GERD (gastroesophageal reflux disease)     controlled w/ prescription meds and OTC meds (pepcid)    H/O heart artery stent     S/P RCA stent by Dr. Machelle Meek in 2003    Heart disease 8/3/2016    High frequency hearing loss     History of bilateral inguinal hernia repair     History of gastroesophageal reflux (GERD)     controlled w/ med; no breakthrough GERD    Hx of exercise stress test 02/22/2019    EF on Stress test 71%    Hypercholesterolemia     takes statin and fish oil    Hyperlipidemia     takes statin and fish oil    Hypertrophy of both inferior nasal turbinates     Kidney stone     Liver disease     going for MRI; recent CT showed mass on liver    MHL (mixed hearing loss)     NO (nasal obstruction)     Otitis media, nonsuppurative     SNHL (sensorineural hearing loss) 8/3/2016    Squamous cell carcinoma, arm     left forearm near wrist; several other sites as well    Type 2 diabetes mellitus (HCC)     insulin reliant/AVG FBS: 100-120/s.s of hypoglycemia at 70/last A1c:7.5    Unspecified sleep apnea     no cpap; patient states he lost weight and does not have TARIQ anymore    Vertigo        Allergies: Allergies   Allergen Reactions    Iohexol Anaphylaxis    Other Medication Anaphylaxis     Omnipaque Rediflo 240 (contrast)    Atorvastatin Myalgia     Currently tolerating and currently taking    Cipro [Ciprofloxacin] Hives    Codeine Unknown (comments)     Makes patient hyperactive    Iodinated Contrast Media Anaphylaxis    Penicillins Rash    Valdecoxib Hives and Rash     Vioxx       Brief Hospital Course: The patient was admitted and had the procedure listed below performed without difficulty. His hospital course progressed as expected. No acute events occurred throughout his hospital stay. He was discharged in stable condition. Admission Physical Exam notable for:    BPH/LUTS    Admission Lab/Radiology studies notable for:   R Ureter Stone    Surgical Procedures:  Cystoscopy, RIGHT Ureteroscopy, Laser Lithotripsy, RIGHT Ureteral Stent Placement and Bipolar TURP    Condition at Discharge: Stable    Discharge Diagnoses:     Ureteral stone [N20.1]; BPH with urinary obstruction [N40.1, N13.8];BPH (benign prostatic hyperplasia) [N40.0]    Significant Diagnostic Studies and Procedures:  Noted in brief hospital course.     Consults:  None    Patient Disposition: home       Patient instructions/medications:    Current Facility-Administered Medications:     lipase-protease-amylase (ZENPEP 20,000) capsule 2 Cap, 2 Cap, Oral, TID WITH MEALS, Sun Pink MD, 2 Cap at 03/31/21 1116    atorvastatin (LIPITOR) tablet 20 mg, 20 mg, Oral, Alysha Mills MD, 20 mg at 03/30/21 2228    fluticasone propionate (FLONASE) 50 mcg/actuation nasal spray 2 Spray, 2 Spray, Both Nostrils, DAILY, Colton Eller MD    insulin glargine (LANTUS) injection 25 Units, 25 Units, SubCUTAneous, BID, Kalpana Ramirez MD, Stopped at 03/31/21 0900    insulin lispro (HUMALOG) injection 15 Units, 15 Units, SubCUTAneous, BID WITH MEALS, Kalpana Ramirez MD, Stopped at 03/31/21 1200    pantoprazole (PROTONIX) tablet 40 mg, 40 mg, Oral, BID, Kalpana Ramirez MD, 40 mg at 03/31/21 7111    tamsulosin (FLOMAX) capsule 0.4 mg, 0.4 mg, Oral, QHS, Kalpana aRmirez MD, 0.4 mg at 03/30/21 2228    sucralfate (CARAFATE) tablet 1 g, 1 g, Oral, AC&HS, Kalpana Ramirez MD, 1 g at 03/31/21 1116    sodium chloride (NS) flush 5-40 mL, 5-40 mL, IntraVENous, Q8H, Kalpana Ramirez MD, 10 mL at 03/30/21 2228    sodium chloride (NS) flush 5-40 mL, 5-40 mL, IntraVENous, PRN, Kalpana Ramirez MD    acetaminophen (TYLENOL) tablet 650 mg, 650 mg, Oral, Q4H PRN, Kalpana Ramirez MD    oxyCODONE IR (ROXICODONE) tablet 5-15 mg, 5-15 mg, Oral, Q4H PRN, Kalpana Ramirez MD    morphine injection 2 mg, 2 mg, IntraVENous, Q1H PRN, Kalpana Ramirez MD    naloxone [facility]) injection 0.4 mg, 0.4 mg, IntraVENous, PRN, Kalpana Ramirez MD    ondansetron [facility]) injection 4 mg, 4 mg, IntraVENous, Q4H PRN, Kalpana Ramirez MD    senna-docusate (PERICOLACE) 8.6-50 mg per tablet 1 Tab, 1 Tab, Oral, BID, Kalpana Ramirez MD, Stopped at 03/31/21 3512    0.9% sodium chloride infusion, 100 mL/hr, IntraVENous, CONTINUOUS, Kalpana Ramirez MD, Last Rate: 100 mL/hr at 03/30/21 2227, 100 mL/hr at 03/30/21 2227    bisacodyL (DULCOLAX) suppository 10 mg, 10 mg, Rectal, BID, Kalpana Ramirez MD, Stopped at 03/31/21 1021    polyethylene glycol (MIRALAX) packet 17 g, 17 g, Oral, BID, Kalpana Ramirez MD, Stopped at 03/31/21 0837    hyoscyamine SL (LEVSIN/SL) tablet 0.125 mg, 0.125 mg, SubLINGual, Q4H PRN, Jose Luis Crooks MD, 0.125 mg at 03/30/21 2010    Pending items needing follow up: Priscila Thurman was instructed to follow up as indicated above. Signed:  Miles Lee. Karoline Kilpatrick M.D. [facility]  [facility]4 [address]  Phone: [phone]  Fax: [phone]    cc:  Primary Care Physician:  Verified  Referring physicians:     Additional provider(s):

## 2021-04-01 NOTE — PROGRESS NOTES
Mr. Shaheen Juárez, your pathology shows NO cancer. We will plan to keep your follow up as scheduled.

## 2021-04-13 ENCOUNTER — HOSPITAL ENCOUNTER (OUTPATIENT)
Dept: LAB | Age: 86
Discharge: HOME | End: 2021-04-13
Payer: MEDICARE

## 2021-04-13 DIAGNOSIS — R16.0 LIVER MASS: ICD-10-CM

## 2021-04-13 LAB
ALBUMIN SERPL-MCNC: 3.4 G/DL (ref 3.2–4.6)
ALBUMIN/GLOB SERPL: 0.9 {RATIO} (ref 1.2–3.5)
ALP SERPL-CCNC: 77 U/L (ref 50–136)
ALT SERPL-CCNC: 15 U/L (ref 12–65)
ANION GAP SERPL CALC-SCNC: 4 MMOL/L (ref 7–16)
AST SERPL-CCNC: 15 U/L (ref 15–37)
BASOPHILS # BLD: 0.1 K/UL (ref 0–0.2)
BASOPHILS NFR BLD: 1 % (ref 0–2)
BILIRUB SERPL-MCNC: 0.4 MG/DL (ref 0.2–1.1)
BUN SERPL-MCNC: 23 MG/DL (ref 8–23)
CALCIUM SERPL-MCNC: 8.6 MG/DL (ref 8.3–10.4)
CHLORIDE SERPL-SCNC: 104 MMOL/L (ref 98–107)
CO2 SERPL-SCNC: 27 MMOL/L (ref 21–32)
CREAT SERPL-MCNC: 2 MG/DL (ref 0.8–1.5)
DIFFERENTIAL METHOD BLD: ABNORMAL
EOSINOPHIL # BLD: 0.5 K/UL (ref 0–0.8)
EOSINOPHIL NFR BLD: 7 % (ref 0.5–7.8)
ERYTHROCYTE [DISTWIDTH] IN BLOOD BY AUTOMATED COUNT: 14 % (ref 11.9–14.6)
GLOBULIN SER CALC-MCNC: 3.6 G/DL (ref 2.3–3.5)
GLUCOSE SERPL-MCNC: 294 MG/DL (ref 65–100)
HCT VFR BLD AUTO: 37.2 %
HGB BLD-MCNC: 11.2 G/DL (ref 13.6–17.2)
IMM GRANULOCYTES # BLD AUTO: 0 K/UL (ref 0–0.5)
IMM GRANULOCYTES NFR BLD AUTO: 0 % (ref 0–5)
LYMPHOCYTES # BLD: 1.6 K/UL (ref 0.5–4.6)
LYMPHOCYTES NFR BLD: 20 % (ref 13–44)
MCH RBC QN AUTO: 27.7 PG (ref 26.1–32.9)
MCHC RBC AUTO-ENTMCNC: 30.1 G/DL (ref 31.4–35)
MCV RBC AUTO: 91.9 FL (ref 79.6–97.8)
MONOCYTES # BLD: 0.5 K/UL (ref 0.1–1.3)
MONOCYTES NFR BLD: 6 % (ref 4–12)
NEUTS SEG # BLD: 5.3 K/UL (ref 1.7–8.2)
NEUTS SEG NFR BLD: 66 % (ref 43–78)
NRBC # BLD: 0 K/UL (ref 0–0.2)
PLATELET # BLD AUTO: 258 K/UL (ref 150–450)
PMV BLD AUTO: 9.8 FL (ref 9.4–12.3)
POTASSIUM SERPL-SCNC: 4.8 MMOL/L (ref 3.5–5.1)
PROT SERPL-MCNC: 7 G/DL (ref 6.3–8.2)
RBC # BLD AUTO: 4.05 M/UL (ref 4.23–5.6)
SODIUM SERPL-SCNC: 135 MMOL/L (ref 136–145)
WBC # BLD AUTO: 8.1 K/UL (ref 4.3–11.1)

## 2021-04-13 PROCEDURE — 85025 COMPLETE CBC W/AUTO DIFF WBC: CPT

## 2021-04-13 PROCEDURE — 80053 COMPREHEN METABOLIC PANEL: CPT

## 2021-04-13 PROCEDURE — 36415 COLL VENOUS BLD VENIPUNCTURE: CPT

## 2021-04-16 ENCOUNTER — HOSPITAL ENCOUNTER (OUTPATIENT)
Dept: ULTRASOUND IMAGING | Age: 86
Discharge: HOME | End: 2021-04-16
Attending: INTERNAL MEDICINE
Payer: MEDICARE

## 2021-04-16 VITALS
HEIGHT: 68 IN | DIASTOLIC BLOOD PRESSURE: 58 MMHG | OXYGEN SATURATION: 96 % | HEART RATE: 59 BPM | TEMPERATURE: 98.2 F | BODY MASS INDEX: 26.52 KG/M2 | WEIGHT: 175 LBS | RESPIRATION RATE: 16 BRPM | SYSTOLIC BLOOD PRESSURE: 121 MMHG

## 2021-04-16 DIAGNOSIS — R16.0 LIVER MASS: ICD-10-CM

## 2021-04-16 LAB
GLUCOSE BLD STRIP.AUTO-MCNC: 158 MG/DL (ref 65–100)
SERVICE CMNT-IMP: ABNORMAL

## 2021-04-16 PROCEDURE — 88307 TISSUE EXAM BY PATHOLOGIST: CPT

## 2021-04-16 PROCEDURE — 82962 GLUCOSE BLOOD TEST: CPT

## 2021-04-16 PROCEDURE — 77030036720 US GUIDE BX LIV PERC

## 2021-04-16 PROCEDURE — 88313 SPECIAL STAINS GROUP 2: CPT

## 2021-04-16 PROCEDURE — 99152 MOD SED SAME PHYS/QHP 5/>YRS: CPT

## 2021-04-16 PROCEDURE — 74011000250 HC RX REV CODE- 250: Performed by: RADIOLOGY

## 2021-04-16 PROCEDURE — 74011250636 HC RX REV CODE- 250/636: Performed by: RADIOLOGY

## 2021-04-16 RX ORDER — FENTANYL CITRATE 50 UG/ML
25-100 INJECTION, SOLUTION INTRAMUSCULAR; INTRAVENOUS
Status: DISCONTINUED | OUTPATIENT
Start: 2021-04-16 | End: 2021-04-20 | Stop reason: HOSPADM

## 2021-04-16 RX ORDER — MIDAZOLAM HYDROCHLORIDE 1 MG/ML
.5-2 INJECTION, SOLUTION INTRAMUSCULAR; INTRAVENOUS
Status: DISCONTINUED | OUTPATIENT
Start: 2021-04-16 | End: 2021-04-20 | Stop reason: HOSPADM

## 2021-04-16 RX ORDER — LIDOCAINE HYDROCHLORIDE 20 MG/ML
2-20 INJECTION, SOLUTION INFILTRATION; PERINEURAL
Status: DISCONTINUED | OUTPATIENT
Start: 2021-04-16 | End: 2021-04-20 | Stop reason: HOSPADM

## 2021-04-16 RX ORDER — SODIUM CHLORIDE 9 MG/ML
25 INJECTION, SOLUTION INTRAVENOUS ONCE
Status: COMPLETED | OUTPATIENT
Start: 2021-04-16 | End: 2021-04-16

## 2021-04-16 RX ADMIN — FENTANYL CITRATE 25 MCG: 50 INJECTION, SOLUTION INTRAMUSCULAR; INTRAVENOUS at 11:01

## 2021-04-16 RX ADMIN — FENTANYL CITRATE 25 MCG: 50 INJECTION, SOLUTION INTRAMUSCULAR; INTRAVENOUS at 10:57

## 2021-04-16 RX ADMIN — MIDAZOLAM 0.5 MG: 1 INJECTION INTRAMUSCULAR; INTRAVENOUS at 11:01

## 2021-04-16 RX ADMIN — MIDAZOLAM 0.5 MG: 1 INJECTION INTRAMUSCULAR; INTRAVENOUS at 11:04

## 2021-04-16 RX ADMIN — SODIUM CHLORIDE 25 ML/HR: 900 INJECTION, SOLUTION INTRAVENOUS at 10:45

## 2021-04-16 RX ADMIN — LIDOCAINE HYDROCHLORIDE 6 ML: 20 INJECTION, SOLUTION INFILTRATION; PERINEURAL at 11:15

## 2021-04-16 RX ADMIN — MIDAZOLAM 0.5 MG: 1 INJECTION INTRAMUSCULAR; INTRAVENOUS at 10:57

## 2021-04-16 RX ADMIN — FENTANYL CITRATE 25 MCG: 50 INJECTION, SOLUTION INTRAMUSCULAR; INTRAVENOUS at 11:04

## 2021-04-16 NOTE — PROCEDURES
Department of Interventional Radiology  [phone]        Interventional Radiology Brief Procedure Note    Patient: Bonifacio Mohs MRN: [id number]  SSN: [id number]    YOB: 1933  Age: 80 y.o.   Sex: male      Date of Procedure: 4/16/2021    Pre-Procedure Diagnosis: liver mass    Post-Procedure Diagnosis: SAME    Procedure(s): Image Guided Biopsy    Brief Description of Procedure: as above    Performed By: Ayaan Parsons MD     Assistants: None    Anesthesia:Moderate Sedation    Estimated Blood Loss: Less than 10ml    Specimens:  Pathology    Implants:  None    Findings: right lower liver lesion    Complications: None    Recommendations: routine post care     Follow Up: as needed    Signed By: Ayaan Parsons MD     April 16, 2021

## 2021-04-16 NOTE — PROGRESS NOTES
TRANSFER - OUT REPORT:    Verbal report given to Meghan(name) on Jan Caballero  being transferred to IR recovery(unit) for routine progression of care       Report consisted of patients Situation, Background, Assessment and   Recommendations(SBAR). Information from the following report(s) SBAR, Procedure Summary and MAR was reviewed with the receiving nurse. Opportunity for questions and clarification was provided. Conscious Sedation:   75 Mcg of Fentanyl administered  1.5 Mg of Versed administered    Pt tolerated procedure well. Visit Vitals  /60   Pulse 61   Temp 98.2 °F (36.8 °C)   Resp 14   Ht 5' 8\" (1.727 m)   Wt 79.4 kg (175 lb)   SpO2 99%   BMI 26.61 kg/m²     Past Medical History:   Diagnosis Date    Abnormal CT of liver 03/02/2021    New heterogeneous liver mass measuring up to 6.4 cm.  Further evaluation required    Anemia     GI esophageal ulcer per EGD- was given 2 units prbc's 3/12/21, eliquis has been stopped    AR (allergic rhinitis) 8/3/2016    Arthritis     Benign paroxysmal positional vertigo     Bilateral impacted cerumen     BPH (benign prostatic hyperplasia)     CAD (coronary artery disease)     2003 stent placed; MI in 2003     Chronic kidney disease     Stage III kidney disease elevated creatinine BUN    Chronic otitis media     Chronic pain     herniated disc in lower back; ESIs in recent past    Chronic pancreatitis ([address]     Claustrophobia     Diverticulitis 8/3/2016    DNS (deviated nasal septum)     ETD (eustachian tube dysfunction)     GERD (gastroesophageal reflux disease)     controlled w/ prescription meds and OTC meds (pepcid)    H/O heart artery stent     S/P RCA stent by Dr. Kevin Perez in 2003    Heart disease 8/3/2016    High frequency hearing loss     History of bilateral inguinal hernia repair     History of gastroesophageal reflux (GERD)     controlled w/ med; no breakthrough GERD    Hx of exercise stress test 02/22/2019    EF on Stress test 71%    Hypercholesterolemia     takes statin and fish oil    Hyperlipidemia     takes statin and fish oil    Hypertrophy of both inferior nasal turbinates     Kidney stone     Liver disease     going for MRI; recent CT showed mass on liver    Liver mass 4/16/2021    MHL (mixed hearing loss)     NO (nasal obstruction)     Otitis media, nonsuppurative     SNHL (sensorineural hearing loss) 8/3/2016    Squamous cell carcinoma, arm     left forearm near wrist; several other sites as well    Type 2 diabetes mellitus (HCC)     insulin reliant/AVG FBS: 100-120/s.s of hypoglycemia at 70/last A1c:7.5    Unspecified sleep apnea     no cpap; patient states he lost weight and does not have TARIQ anymore    Vertigo      Peripheral IV 04/16/21 Right Wrist (Active)   Site Assessment Clean, dry, & intact 04/16/21 0932   Phlebitis Assessment 0 04/16/21 0932   Infiltration Assessment 0 04/16/21 0932   Dressing Status Clean, dry, & intact 04/16/21 0932   Hub Color/Line Status Blue 04/16/21 0932

## 2021-04-16 NOTE — DISCHARGE INSTRUCTIONS
[address]  Department of Interventional Radiology  [facility]  [phone] Office  [phone] Fax    BIOPSY DISCHARGE INSTRUCTIONS    General Instructions:     A biopsy is the removal of a small piece of tissue for microscopic examination or testing. Healthy tissue can be obtained for the purpose of tissue-type matching for transplants. Unhealthy tissues are more commonly biopsied to diagnose disease. Lung Biopsy:     A needle lung biopsy is performed when there is a mass discovered in the lung area. The most serious risk is infection, bleeding, and pneumothorax (a collapsed lung). Signs of pneumothorax include shortness of breath, rapid heart rate, and blueness of the skin. If any of these occur, call 911. Liver Biopsy: This test helps detect cancer, infections, and the cause of an enlargement of the liver or elevated liver enzymes. It also helps to diagnose a number of liver diseases. The pain associated with the procedure may be felt in the shoulder. The risks include internal bleeding, pneumothorax, and injury to the surrounding organs. Renal Biopsy: This procedure is sometimes done to evaluate a transplanted kidney. It is also used to evaluate unexplained decrease in kidney function, blood, or protein in the urine. You may see bright red blood in the urine the first 24 hours after the test. If the bleeding lasts longer, you need to call your doctor. There is a risk of infection and bleeding into the muscle, which may cause soreness. Spinal Biopsy: This test is sometimes done in conjunction with other procedures. Your back will be sore, as we are taking a small sample of bone, which is slightly more difficult to sample than tissue. General Biopsy:     A mass can grow in any area of the body, and we are taking a specimen as ordered by your doctor. The risks are the same.  They include bleeding, pain, and infection. Home Care Instructions: You may resume your regular diet and medication regimen. Do not drink alcohol, drive, or make any important legal decisions in the next 24 hours. Do not lift anything heavier than a gallon of milk until the soreness goes away. You may use over the counter acetaminophen or ibuprofen for the soreness. You may apply an ice pack to the affected area for 20-30 minutes at time for the first 24 hours. After that, you may apply a heat pack. Call If: You should call your Physician and/or the Radiology Nurse if you have any questions or concerns about the biopsy site. Call if you should have increased pain, fever, redness, drainage, or bleeding more than a small spot on the bandage. Follow-Up Instructions: Please see your ordering doctor as he/she has requested. To Reach Us: If you have any questions about your procedure, please call the Interventional Radiology department at [phone]. After business hours (5pm) and weekends, call the answering service at [phone] and ask for the Radiologist on call to be paged. Si tiene Preguntas acerca del procedimiento, por favor llame al departamento de Radiología Intervencional al [phone]. Después de horas de oficina (5 pm) y los fines de [place], llamar al Emily Aquino al [phone] y pregunte por el Radiologo de [facility] [place] [facility]. Interventional Radiology General Nurse Discharge    After general anesthesia or intravenous sedation, for 24 hours or while taking prescription Narcotics:  · Limit your activities  · Do not drive and operate hazardous machinery  · Do not make important personal or business decisions  · Do  not drink alcoholic beverages  · If you have not urinated within 8 hours after discharge, please contact your surgeon on call. * Please give a list of your current medications to your Primary Care Provider.   * Please update this list whenever your medications are discontinued, doses are changed, or new medications (including over-the-counter products) are added. * Please carry medication information at all times in case of emergency situations. These are general instructions for a healthy lifestyle:    No smoking/ No tobacco products/ Avoid exposure to second hand smoke  Surgeon General's Warning:  Quitting smoking now greatly reduces serious risk to your health. Obesity, smoking, and sedentary lifestyle greatly increases your risk for illness  A healthy diet, regular physical exercise & weight monitoring are important for maintaining a healthy lifestyle    You may be retaining fluid if you have a history of heart failure or if you experience any of the following symptoms:  Weight gain of 3 pounds or more overnight or 5 pounds in a week, increased swelling in our hands or feet or shortness of breath while lying flat in bed. Please call your doctor as soon as you notice any of these symptoms; do not wait until your next office visit. Recognize signs and symptoms of STROKE:  F-face looks uneven    A-arms unable to move or move unevenly    S-speech slurred or non-existent    T-time-call 911 as soon as signs and symptoms begin-DO NOT go       Back to bed or wait to see if you get better-TIME IS BRAIN.         Patient Signature:  Date: 4/16/2021  Discharging Nurse: Genet Gibson

## 2021-04-16 NOTE — H&P
Department of Interventional Radiology  [phone]    History and Physical    Patient:  Andrey Jose MRN:  [id number]  SSN:  [id number]    YOB: 1933  Age:  80 y.o. Sex:  male      Primary Care Provider:  Steven Coates MD  Referring Physician:  Joyce Apple MD    Subjective:     Chief Complaint: Liver mass    History of the Present Illness: The patient is a 80 y.o. male who presents for  liver mass biopsy. Liver mass was an incidental finding during ureteral stone work up in March. LFTs are not elevated. Hepatitis panel negative. Tumor markers reviewed. Imaging with MRI reviewed. The patient feels well and denies RUQ pain. No unintentional weight loss, N/V, or jaundice. Past Medical History:   Diagnosis Date    Abnormal CT of liver 03/02/2021    New heterogeneous liver mass measuring up to 6.4 cm.  Further evaluation required    Anemia     GI esophageal ulcer per EGD- was given 2 units prbc's 3/12/21, eliquis has been stopped    AR (allergic rhinitis) 8/3/2016    Arthritis     Benign paroxysmal positional vertigo     Bilateral impacted cerumen     BPH (benign prostatic hyperplasia)     CAD (coronary artery disease)     2003 stent placed; MI in 2003     Chronic kidney disease     Stage III kidney disease elevated creatinine BUN    Chronic otitis media     Chronic pain     herniated disc in lower back; ESIs in recent past    Chronic pancreatitis [address] [address]     Claustrophobia     Diverticulitis 8/3/2016    DNS (deviated nasal septum)     ETD (eustachian tube dysfunction)     GERD (gastroesophageal reflux disease)     controlled w/ prescription meds and OTC meds (pepcid)    H/O heart artery stent     S/P RCA stent by Dr. Cleopatra Kyle in 2003    Heart disease 8/3/2016    High frequency hearing loss     History of bilateral inguinal hernia repair     History of gastroesophageal reflux (GERD)     controlled w/ med; no breakthrough GERD    Hx of exercise stress test 02/22/2019    EF on Stress test 71%    Hypercholesterolemia     takes statin and fish oil    Hyperlipidemia     takes statin and fish oil    Hypertrophy of both inferior nasal turbinates     Kidney stone     Liver disease     going for MRI; recent CT showed mass on liver    Liver mass 4/16/2021    MHL (mixed hearing loss)     NO (nasal obstruction)     Otitis media, nonsuppurative     SNHL (sensorineural hearing loss) 8/3/2016    Squamous cell carcinoma, arm     left forearm near wrist; several other sites as well    Type 2 diabetes mellitus (HCC)     insulin reliant/AVG FBS: 100-120/s.s of hypoglycemia at 70/last A1c:7.5    Unspecified sleep apnea     no cpap; patient states he lost weight and does not have TARIQ anymore    Vertigo      Past Surgical History:   Procedure Laterality Date    HX APPENDECTOMY      HX CATARACT REMOVAL Bilateral     HX COLONOSCOPY      HX CORONARY STENT PLACEMENT  2003    stent RCA 2003    HX ENDOSCOPY  03/11/2021    HX ERCP  12/2015    ENDOSCOPIC RETROGRADE CHOLANGIOPANCREATOGRAPHY (N/A Upper GI Region) ENDOSCOPIC SPHINCTEROTOMY (N/A Upper GI Region) ENDOSCOPIC STONE EXTRACTION/BALLOON SWEEP (N/A Upper GI Region)    HX HERNIA REPAIR Bilateral 1980s    HX HERNIA REPAIR Left 2014    LIH with mesh    HX MASTOIDECTOMY  1996    right modified canal wall down    HX ORTHOPAEDIC Right 1/14/03    Dupuytrens contracture release    HX OTHER SURGICAL      skin lesion; local excision: SCC    HX TURP      HX TYMPANOSTOMY Right     tube right ear    HX VITRECTOMY      TN CYSTOSCOPY,INSERT URETERAL STENT Right 03/05/2021        Review of Systems:    Pertinent items are noted in the History of Present Illness. Current Outpatient Medications   Medication Sig    pantoprazole (PROTONIX) 40 mg tablet Take 1 Tab by mouth two (2) times a day.  lipase-protease-amylase (Creon) 24,000-76,000 -120,000 unit capsule Take 2 Caps by mouth three (3) times daily (with meals).  1-2 cap TID with meals and 1 cap with each snack    insulin glargine (Lantus U-100 Insulin) 100 unit/mL injection 25 Units by SubCUTAneous route two (2) times a day. Adjusted to meal CHO any range between 15-25 units    atorvastatin (Lipitor) 20 mg tablet Take 20 mg by mouth nightly.  fluticasone propionate (FLONASE) 50 mcg/actuation nasal spray Use 2 sprays in each nostril daily    insulin aspart (NOVOLOG FLEXPEN U-100 INSULIN SC) 15 Units by SubCUTAneous route Before breakfast, lunch, and dinner. Adjusted to meal CHO any range between 5-15 units    tamsulosin (FLOMAX) 0.4 mg capsule TAKE 1 CAPSULE BY MOUTH EVERYDAY AT BEDTIME    cholecalciferol (VITAMIN D3) 1,000 unit cap Take  by mouth daily.  FISH OIL 1,000 mg Cap Take 2 Caps by mouth daily. Am.  1 tsp liquid- concentrated  Last dose 5/13/14     No current facility-administered medications for this encounter. Allergies   Allergen Reactions    Iohexol Anaphylaxis    Other Medication Anaphylaxis     Omnipaque Rediflo 240 (contrast)    Atorvastatin Myalgia     Currently tolerating and currently taking    Cipro [Ciprofloxacin] Hives    Codeine Unknown (comments)     Makes patient hyperactive    Iodinated Contrast Media Anaphylaxis    Penicillins Rash    Valdecoxib Hives and Rash     Vioxx       Family History   Problem Relation Age of Onset    Cancer Maternal Grandmother         colon    Heart Disease Maternal Grandfather      Social History     Tobacco Use    Smoking status: Never Smoker    Smokeless tobacco: Never Used   Substance Use Topics    Alcohol use: No        Objective:       Physical Examination:    Vitals:    04/16/21 0923   BP: (!) 167/73   Pulse: 63   Resp: 18   Temp: 98.2 °F (36.8 °C)   SpO2: 97%   Weight: 79.4 kg (175 lb)   Height: 5' 8\" (1.727 m)     Blood pressure (!) 167/73, pulse 63, temperature 98.2 °F (36.8 °C), resp. rate 18, height 5' 8\" (1.727 m), weight 79.4 kg (175 lb), SpO2 97 %.   HEART: regular rate and rhythm  LUNG: clear to auscultation bilaterally  ABDOMEN: soft, non-tender.  Bowel sounds normal. No masses,  no organomegaly  EXTREMITIES: normal strength, tone, and muscle mass    Laboratory:     Lab Results   Component Value Date/Time    Sodium 135 (L) 04/13/2021 02:01 PM    Sodium 143 03/31/2021 04:55 AM    Potassium 4.8 04/13/2021 02:01 PM    Potassium 4.3 03/31/2021 04:55 AM    Chloride 104 04/13/2021 02:01 PM    Chloride 113 (H) 03/31/2021 04:55 AM    CO2 27 04/13/2021 02:01 PM    CO2 26 03/31/2021 04:55 AM    Anion gap 4 (L) 04/13/2021 02:01 PM    Anion gap 4 (L) 03/31/2021 04:55 AM    Glucose 294 (H) 04/13/2021 02:01 PM    Glucose 66 03/31/2021 04:55 AM    BUN 23 04/13/2021 02:01 PM    BUN 25 (H) 03/31/2021 04:55 AM    Creatinine 2.00 (H) 04/13/2021 02:01 PM    Creatinine 1.85 (H) 03/31/2021 04:55 AM    GFR est AA 41 (L) 04/13/2021 02:01 PM    GFR est AA 45 (L) 03/31/2021 04:55 AM    GFR est non-AA 34 (L) 04/13/2021 02:01 PM    GFR est non-AA 37 (L) 03/31/2021 04:55 AM    Calcium 8.6 04/13/2021 02:01 PM    Calcium 8.6 03/31/2021 04:55 AM    Magnesium 2.0 03/28/2021 01:26 AM    Magnesium 2.1 03/07/2021 07:13 AM    Phosphorus 3.3 03/07/2021 07:13 AM    Phosphorus 3.5 03/06/2021 07:19 PM    Albumin 3.4 04/13/2021 02:01 PM    Albumin 3.0 (L) 03/28/2021 01:26 AM    Protein, total 7.0 04/13/2021 02:01 PM    Protein, total 6.6 03/28/2021 01:26 AM    Globulin 3.6 (H) 04/13/2021 02:01 PM    Globulin 3.6 (H) 03/28/2021 01:26 AM    A-G Ratio 0.9 (L) 04/13/2021 02:01 PM    A-G Ratio 0.8 (L) 03/28/2021 01:26 AM    ALT (SGPT) 15 04/13/2021 02:01 PM    ALT (SGPT) 22 03/28/2021 01:26 AM     Lab Results   Component Value Date/Time    WBC 8.1 04/13/2021 02:01 PM    WBC 6.7 03/31/2021 04:55 AM    HGB 11.2 (L) 04/13/2021 02:01 PM    HGB 9.8 (L) 03/31/2021 04:55 AM    HCT 37.2 04/13/2021 02:01 PM    HCT 32.4 (L) 03/31/2021 04:55 AM    PLATELET 918 96/24/6257 02:01 PM    PLATELET 445 45/50/1578 04:55 AM     Lab Results Component Value Date/Time    Prothrombin time 14.1 03/13/2021 08:25 AM    Prothrombin time 14.3 03/10/2021 09:57 AM    INR 1.1 03/13/2021 08:25 AM    INR 1.1 03/10/2021 09:57 AM       Assessment:     80year old male with incidental finding of a large liver mass     Plan:     Planned Procedure: US guided Liver mass biopsy    Risks, benefits, and alternatives reviewed with patient and he agrees to proceed with the procedure.       Signed By: Rody De Leon NP     April 16, 2021

## 2021-05-05 ENCOUNTER — HOSPITAL ENCOUNTER (OUTPATIENT)
Dept: GENERAL RADIOLOGY | Age: 86
Discharge: HOME | End: 2021-05-05
Attending: SURGERY
Payer: MEDICARE

## 2021-05-05 ENCOUNTER — HOSPITAL ENCOUNTER (OUTPATIENT)
Dept: SURGERY | Age: 86
Discharge: HOME | End: 2021-05-05
Payer: MEDICARE

## 2021-05-05 VITALS
RESPIRATION RATE: 16 BRPM | HEIGHT: 69 IN | DIASTOLIC BLOOD PRESSURE: 64 MMHG | HEART RATE: 66 BPM | OXYGEN SATURATION: 97 % | WEIGHT: 183.7 LBS | BODY MASS INDEX: 27.21 KG/M2 | SYSTOLIC BLOOD PRESSURE: 155 MMHG | TEMPERATURE: 97.5 F

## 2021-05-05 LAB
ALBUMIN SERPL-MCNC: 3.6 G/DL (ref 3.2–4.6)
ALBUMIN/GLOB SERPL: 1 {RATIO} (ref 1.2–3.5)
ALP SERPL-CCNC: 75 U/L (ref 50–136)
ALT SERPL-CCNC: 16 U/L (ref 12–65)
ANION GAP SERPL CALC-SCNC: 5 MMOL/L (ref 7–16)
APPEARANCE UR: CLEAR
AST SERPL-CCNC: 11 U/L (ref 15–37)
ATRIAL RATE: 61 BPM
BACTERIA URNS QL MICRO: 0 /HPF
BASOPHILS # BLD: 0.1 K/UL (ref 0–0.2)
BASOPHILS NFR BLD: 1 % (ref 0–2)
BILIRUB SERPL-MCNC: 0.5 MG/DL (ref 0.2–1.1)
BILIRUB UR QL: NEGATIVE
BUN SERPL-MCNC: 22 MG/DL (ref 8–23)
CALCIUM SERPL-MCNC: 8.5 MG/DL (ref 8.3–10.4)
CALCULATED P AXIS, ECG09: 40 DEGREES
CALCULATED R AXIS, ECG10: 55 DEGREES
CALCULATED T AXIS, ECG11: 59 DEGREES
CASTS URNS QL MICRO: ABNORMAL /LPF
CHLORIDE SERPL-SCNC: 111 MMOL/L (ref 98–107)
CO2 SERPL-SCNC: 23 MMOL/L (ref 21–32)
COLOR UR: YELLOW
CREAT SERPL-MCNC: 1.98 MG/DL (ref 0.8–1.5)
DIAGNOSIS, 93000: NORMAL
DIFFERENTIAL METHOD BLD: ABNORMAL
EOSINOPHIL # BLD: 0.6 K/UL (ref 0–0.8)
EOSINOPHIL NFR BLD: 9 % (ref 0.5–7.8)
EPI CELLS #/AREA URNS HPF: 0 /HPF
ERYTHROCYTE [DISTWIDTH] IN BLOOD BY AUTOMATED COUNT: 13.9 % (ref 11.9–14.6)
GLOBULIN SER CALC-MCNC: 3.5 G/DL (ref 2.3–3.5)
GLUCOSE BLD STRIP.AUTO-MCNC: 245 MG/DL (ref 65–100)
GLUCOSE SERPL-MCNC: 267 MG/DL (ref 65–100)
GLUCOSE UR STRIP.AUTO-MCNC: 100 MG/DL
HCT VFR BLD AUTO: 37.8 % (ref 41.1–50.3)
HGB BLD-MCNC: 11.7 G/DL (ref 13.6–17.2)
HGB UR QL STRIP: ABNORMAL
IMM GRANULOCYTES # BLD AUTO: 0 K/UL (ref 0–0.5)
IMM GRANULOCYTES NFR BLD AUTO: 1 % (ref 0–5)
INR PPP: 1
KETONES UR QL STRIP.AUTO: NEGATIVE MG/DL
LEUKOCYTE ESTERASE UR QL STRIP.AUTO: ABNORMAL
LYMPHOCYTES # BLD: 1.4 K/UL (ref 0.5–4.6)
LYMPHOCYTES NFR BLD: 21 % (ref 13–44)
MCH RBC QN AUTO: 27.5 PG (ref 26.1–32.9)
MCHC RBC AUTO-ENTMCNC: 31 G/DL (ref 31.4–35)
MCV RBC AUTO: 88.9 FL (ref 79.6–97.8)
MONOCYTES # BLD: 0.5 K/UL (ref 0.1–1.3)
MONOCYTES NFR BLD: 8 % (ref 4–12)
NEUTS SEG # BLD: 3.9 K/UL (ref 1.7–8.2)
NEUTS SEG NFR BLD: 60 % (ref 43–78)
NITRITE UR QL STRIP.AUTO: NEGATIVE
NRBC # BLD: 0 K/UL (ref 0–0.2)
P-R INTERVAL, ECG05: 216 MS
PH UR STRIP: 5.5 [PH] (ref 5–9)
PLATELET # BLD AUTO: 192 K/UL (ref 150–450)
PMV BLD AUTO: 10.8 FL (ref 9.4–12.3)
POTASSIUM SERPL-SCNC: 4.5 MMOL/L (ref 3.5–5.1)
PROT SERPL-MCNC: 7.1 G/DL (ref 6.3–8.2)
PROT UR STRIP-MCNC: 30 MG/DL
PROTHROMBIN TIME: 13.1 SEC (ref 12.5–14.7)
Q-T INTERVAL, ECG07: 404 MS
QRS DURATION, ECG06: 92 MS
QTC CALCULATION (BEZET), ECG08: 406 MS
RBC # BLD AUTO: 4.25 M/UL (ref 4.23–5.6)
RBC #/AREA URNS HPF: ABNORMAL /HPF
SERVICE CMNT-IMP: ABNORMAL
SODIUM SERPL-SCNC: 139 MMOL/L (ref 138–145)
SP GR UR REFRACTOMETRY: 1.02 (ref 1–1.02)
UROBILINOGEN UR QL STRIP.AUTO: 0.2 EU/DL (ref 0.2–1)
VENTRICULAR RATE, ECG03: 61 BPM
WBC # BLD AUTO: 6.5 K/UL (ref 4.3–11.1)
WBC URNS QL MICRO: ABNORMAL /HPF

## 2021-05-05 PROCEDURE — 82962 GLUCOSE BLOOD TEST: CPT

## 2021-05-05 PROCEDURE — 80053 COMPREHEN METABOLIC PANEL: CPT

## 2021-05-05 PROCEDURE — 85025 COMPLETE CBC W/AUTO DIFF WBC: CPT

## 2021-05-05 PROCEDURE — 81001 URINALYSIS AUTO W/SCOPE: CPT

## 2021-05-05 PROCEDURE — 93005 ELECTROCARDIOGRAM TRACING: CPT | Performed by: SURGERY

## 2021-05-05 PROCEDURE — 71046 X-RAY EXAM CHEST 2 VIEWS: CPT

## 2021-05-05 PROCEDURE — 85610 PROTHROMBIN TIME: CPT

## 2021-05-05 RX ORDER — ASPIRIN 81 MG/1
81 TABLET ORAL DAILY
COMMUNITY

## 2021-05-05 RX ORDER — GLUCOSAMINE SULFATE 1500 MG
4000 POWDER IN PACKET (EA) ORAL DAILY
COMMUNITY

## 2021-05-05 NOTE — PERIOP NOTES
Recent Results (from the past 12 hour(s))   URINALYSIS W/ RFLX MICROSCOPIC    Collection Time: 05/05/21  2:33 PM   Result Value Ref Range    Color YELLOW      Appearance CLEAR      Specific gravity 1.021 1.001 - 1.023      pH (UA) 5.5 5.0 - 9.0      Protein 30 (A) NEG mg/dL    Glucose 100 mg/dL    Ketone Negative NEG mg/dL    Bilirubin Negative NEG      Blood MODERATE (A) NEG      Urobilinogen 0.2 0.2 - 1.0 EU/dL    Nitrites Negative NEG      Leukocyte Esterase SMALL (A) NEG      WBC 10-20 0 /hpf    RBC 10-20 0 /hpf    Epithelial cells 0 0 /hpf    Bacteria 0 0 /hpf    Casts 0-3 0 /lpf   GLUCOSE, POC    Collection Time: 05/05/21  2:38 PM   Result Value Ref Range    Glucose (POC) 245 (H) 65 - 100 mg/dL    Performed by Chioma    CBC WITH AUTOMATED DIFF    Collection Time: 05/05/21  2:40 PM   Result Value Ref Range    WBC 6.5 4.3 - 11.1 K/uL    RBC 4.25 4.23 - 5.6 M/uL    HGB 11.7 (L) 13.6 - 17.2 g/dL    HCT 37.8 (L) 41.1 - 50.3 %    MCV 88.9 79.6 - 97.8 FL    MCH 27.5 26.1 - 32.9 PG    MCHC 31.0 (L) 31.4 - 35.0 g/dL    RDW 13.9 11.9 - 14.6 %    PLATELET 202 236 - 276 K/uL    MPV 10.8 9.4 - 12.3 FL    ABSOLUTE NRBC 0.00 0.0 - 0.2 K/uL    DF AUTOMATED      NEUTROPHILS 60 43 - 78 %    LYMPHOCYTES 21 13 - 44 %    MONOCYTES 8 4.0 - 12.0 %    EOSINOPHILS 9 (H) 0.5 - 7.8 %    BASOPHILS 1 0.0 - 2.0 %    IMMATURE GRANULOCYTES 1 0.0 - 5.0 %    ABS. NEUTROPHILS 3.9 1.7 - 8.2 K/UL    ABS. LYMPHOCYTES 1.4 0.5 - 4.6 K/UL    ABS. MONOCYTES 0.5 0.1 - 1.3 K/UL    ABS. EOSINOPHILS 0.6 0.0 - 0.8 K/UL    ABS. BASOPHILS 0.1 0.0 - 0.2 K/UL    ABS. IMM.  GRANS. 0.0 0.0 - 0.5 K/UL   METABOLIC PANEL, COMPREHENSIVE    Collection Time: 05/05/21  2:40 PM   Result Value Ref Range    Sodium 139 138 - 145 mmol/L    Potassium 4.5 3.5 - 5.1 mmol/L    Chloride 111 (H) 98 - 107 mmol/L    CO2 23 21 - 32 mmol/L    Anion gap 5 (L) 7 - 16 mmol/L    Glucose 267 (H) 65 - 100 mg/dL    BUN 22 8 - 23 MG/DL    Creatinine 1.98 (H) 0.8 - 1.5 MG/DL    GFR est AA 41 (L) >60 ml/min/1.73m2    GFR est non-AA 34 (L) >60 ml/min/1.73m2    Calcium 8.5 8.3 - 10.4 MG/DL    Bilirubin, total 0.5 0.2 - 1.1 MG/DL    ALT (SGPT) 16 12 - 65 U/L    AST (SGOT) 11 (L) 15 - 37 U/L    Alk. phosphatase 75 50 - 136 U/L    Protein, total 7.1 6.3 - 8.2 g/dL    Albumin 3.6 3.2 - 4.6 g/dL    Globulin 3.5 2.3 - 3.5 g/dL    A-G Ratio 1.0 (L) 1.2 - 3.5     PROTHROMBIN TIME + INR    Collection Time: 05/05/21  2:40 PM   Result Value Ref Range    Prothrombin time 13.1 12.5 - 14.7 sec    INR 1.0     EKG, 12 LEAD, INITIAL    Collection Time: 05/05/21  2:45 PM   Result Value Ref Range    Ventricular Rate 61 BPM    Atrial Rate 61 BPM    P-R Interval 216 ms    QRS Duration 92 ms    Q-T Interval 404 ms    QTC Calculation (Bezet) 406 ms    Calculated P Axis 40 degrees    Calculated R Axis 55 degrees    Calculated T Axis 59 degrees    Diagnosis       Sinus rhythm with 1st degree A-V block  Otherwise normal ECG  When compared with ECG of 28-MAR-2021 01:23,  No significant change was found       CXR Results  (Last 48 hours)               05/05/21 1518  XR CHEST PA LAT Final result    Impression:  No acute cardiopulmonary abnormality. Narrative:  PA AND LATERAL CHEST X-RAY. Clinical Indication: Preoperative evaluation prior to liver resection       Comparison: Chest x-ray dated 3/28/2021       Findings: 2 views of the chest submitted demonstrate the cardiac silhouette and   mediastinum to be unremarkable. There is no pleural effusion or pneumothorax. Tiny calcified granulomas are noted in the upper lobes. Otherwise, the lung   parenchyma is clear. The included osseous structures are unremarkable.                  Labs routed to Dr. Manuelito Cardona

## 2021-05-05 NOTE — PERIOP NOTES
PLEASE CONTINUE TAKING ALL PRESCRIPTION MEDICATIONS UP TO THE DAY OF SURGERY UNLESS OTHERWISE DIRECTED BELOW. DISCONTINUE all vitamins and supplements 7 days prior to surgery. DISCONTINUE Non-Steriodal Anti-Inflammatory (NSAIDS) such as Advil and Aleve 5 days prior to surgery. Home Medications to take  the day of surgery    Aspirin 81 mg   Fluticasone propionate (Flonase)   Pantoprazole (Protonix)     Lantus Insulin Instructions per anesthesia protocol[de-identified]    Day before procedure (5/11/2021):   Morning- Take usual dose of 25 units  Evening- 20 units (80% of usual dose)    Morning of procedure (5/12/21)  Take 20 units (80% of usual dose)      If you feel symptoms of low blood sugar while fasting, check level. If low, drink only 4 ounces of a clear, sugary liquid and call pre-op at [phone]. Home Medications   to Hold   Hold novolog insulin am of surgery        Comments                Please do not bring home medications with you on the day of surgery unless otherwise directed by your nurse. If you are instructed to bring home medications, please give them to your nurse as they will be administered by the nursing staff. If you have any questions, please call [facility] [place] [phone] or [address] [place] [address] [phone]. A copy of this note was provided to the patient for reference.

## 2021-05-05 NOTE — PERIOP NOTES
Patient verified name and     Order for consent was found in EHR and matches case posting; patient verified. Type II surgery, walk-in assessment complete. Labs per surgeon: CBC, CMP, PT/INR UA,T&S  Labs per anesthesia protocol: POC GLUCOSE  EKG: done today and acceptable per protocol    POC glucose 245 . Instructed  Patient that if blood sugar 300 or > , surgery may be cancelled. Patient COVID test date 21; Patient did  show for the appointment. The testing center is located at the [facility] [address] , [place]. If appointment is needed patient provided telephone number of [phone]. Hospital approved surgical skin cleanser and instructions given per hospital policy. Patient provided with and instructed on educational handouts including Guide to Surgery, Pain Management, Hand Hygiene, Blood Transfusion Education, and [facility] Brochure. Patient answered medical/surgical history questions at their best of ability. All prior to admission medications documented in [facility]. Original medication prescription bottle not visualized during patient appointment. Patient instructed to hold all vitamins 7 days prior to surgery and NSAIDS 5 days prior to surgery, patient verbalized understanding. Patient teach back successful and patient demonstrates knowledge of instructions.

## 2021-05-06 PROBLEM — C22.0 HEPATOCELLULAR CARCINOMA (HCC): Status: ACTIVE | Noted: 2021-05-06

## 2021-05-07 NOTE — PERIOP NOTES
Spoke with Nuris at Dr. Amanda Harris office. Verified that patient does indeed need T&C done on 5/12/21 for 2 units PRBCs. She will have [facility] put the order in Monday. Self called and spoke to Mrs. Rocha and informed. She will let patient know to come to [facility] lab 5/12/21. Provided PAT phone number if any questions.

## 2021-05-10 ENCOUNTER — ANESTHESIA EVENT (OUTPATIENT)
Dept: SURGERY | Age: 86
DRG: 406 | End: 2021-05-10
Payer: MEDICARE

## 2021-05-11 ENCOUNTER — HOSPITAL ENCOUNTER (OUTPATIENT)
Dept: CT IMAGING | Age: 86
Discharge: HOME | DRG: 406 | End: 2021-05-11
Attending: INTERNAL MEDICINE
Payer: MEDICARE

## 2021-05-11 ENCOUNTER — HOSPITAL ENCOUNTER (OUTPATIENT)
Dept: LAB | Age: 86
Discharge: HOME | DRG: 406 | End: 2021-05-11
Payer: MEDICARE

## 2021-05-11 DIAGNOSIS — C22.0 HEPATOCELLULAR CARCINOMA (HCC): ICD-10-CM

## 2021-05-11 LAB — HISTORY CHECKED?,CKHIST: NORMAL

## 2021-05-11 PROCEDURE — 71250 CT THORAX DX C-: CPT

## 2021-05-11 PROCEDURE — 86901 BLOOD TYPING SEROLOGIC RH(D): CPT

## 2021-05-11 PROCEDURE — 36415 COLL VENOUS BLD VENIPUNCTURE: CPT

## 2021-05-11 PROCEDURE — 86923 COMPATIBILITY TEST ELECTRIC: CPT

## 2021-05-12 ENCOUNTER — HOSPITAL ENCOUNTER (INPATIENT)
Age: 86
LOS: 1 days | Discharge: HOME | DRG: 406 | End: 2021-05-13
Attending: SURGERY | Admitting: SURGERY
Payer: MEDICARE

## 2021-05-12 ENCOUNTER — ANESTHESIA (OUTPATIENT)
Dept: SURGERY | Age: 86
DRG: 406 | End: 2021-05-12
Payer: MEDICARE

## 2021-05-12 DIAGNOSIS — C22.0 HEPATOCELLULAR CARCINOMA (HCC): Primary | ICD-10-CM

## 2021-05-12 LAB
ARTERIAL PATENCY WRIST A: ABNORMAL
BASE DEFICIT BLD-SCNC: 1.8 MMOL/L
BASE DEFICIT BLD-SCNC: 3.1 MMOL/L
BDY SITE: ABNORMAL
CA-I BLD-MCNC: 1.15 MMOL/L (ref 1.12–1.32)
CA-I BLD-MCNC: 1.18 MMOL/L (ref 1.12–1.32)
CO2 BLD-SCNC: 24 MMOL/L (ref 13–23)
CO2 BLD-SCNC: 25 MMOL/L (ref 13–23)
FIO2, L/MIN - FIO2P: 4
GAS FLOW.O2 O2 DELIVERY SYS: ABNORMAL L/MIN
GLUCOSE BLD STRIP.AUTO-MCNC: 122 MG/DL (ref 65–100)
GLUCOSE BLD STRIP.AUTO-MCNC: 152 MG/DL (ref 65–100)
GLUCOSE BLD STRIP.AUTO-MCNC: 185 MG/DL (ref 65–100)
GLUCOSE BLD STRIP.AUTO-MCNC: 191 MG/DL (ref 65–100)
GLUCOSE BLD STRIP.AUTO-MCNC: 212 MG/DL (ref 65–100)
GLUCOSE BLD STRIP.AUTO-MCNC: 266 MG/DL (ref 65–100)
GLUCOSE BLD STRIP.AUTO-MCNC: 98 MG/DL (ref 65–100)
HCO3 BLD-SCNC: 23.5 MMOL/L (ref 22–26)
HCO3 BLD-SCNC: 24.6 MMOL/L (ref 22–26)
PCO2 BLD: 41 MMHG (ref 35–45)
PCO2 BLD: 54.2 MMHG (ref 35–45)
PH BLD: 7.27 [PH] (ref 7.35–7.45)
PH BLD: 7.37 [PH] (ref 7.35–7.45)
PO2 BLD: 152 MMHG (ref 75–100)
PO2 BLD: 93 MMHG (ref 75–100)
POTASSIUM BLD-SCNC: 4.2 MMOL/L (ref 3.5–5.1)
POTASSIUM BLD-SCNC: 4.3 MMOL/L (ref 3.5–5.1)
SAO2 % BLD: 96 %
SAO2 % BLD: 99 %
SERVICE CMNT-IMP: ABNORMAL
SERVICE CMNT-IMP: NORMAL
SODIUM BLD-SCNC: 142 MMOL/L (ref 136–145)
SODIUM BLD-SCNC: 144 MMOL/L (ref 136–145)
SPECIMEN SITE: ABNORMAL
SPECIMEN SITE: ABNORMAL

## 2021-05-12 PROCEDURE — 2709999900 HC NON-CHARGEABLE SUPPLY

## 2021-05-12 PROCEDURE — 0FT44ZZ RESECTION OF GALLBLADDER, PERCUTANEOUS ENDOSCOPIC APPROACH: ICD-10-PCS | Performed by: SURGERY

## 2021-05-12 PROCEDURE — 77030013794 HC KT TRNSDUC BLD EDWD -B: Performed by: NURSE ANESTHETIST, CERTIFIED REGISTERED

## 2021-05-12 PROCEDURE — 94761 N-INVAS EAR/PLS OXIMETRY MLT: CPT

## 2021-05-12 PROCEDURE — 74011000250 HC RX REV CODE- 250: Performed by: NURSE ANESTHETIST, CERTIFIED REGISTERED

## 2021-05-12 PROCEDURE — 77030019908 HC STETH ESOPH SIMS -A: Performed by: ANESTHESIOLOGY

## 2021-05-12 PROCEDURE — 76998 US GUIDE INTRAOP: CPT | Performed by: SURGERY

## 2021-05-12 PROCEDURE — 82803 BLOOD GASES ANY COMBINATION: CPT

## 2021-05-12 PROCEDURE — 74011250636 HC RX REV CODE- 250/636: Performed by: NURSE ANESTHETIST, CERTIFIED REGISTERED

## 2021-05-12 PROCEDURE — 74011250636 HC RX REV CODE- 250/636: Performed by: NURSE PRACTITIONER

## 2021-05-12 PROCEDURE — 82947 ASSAY GLUCOSE BLOOD QUANT: CPT

## 2021-05-12 PROCEDURE — 77030034849: Performed by: SURGERY

## 2021-05-12 PROCEDURE — 77030009527 HC GEL PRT SYS AMR -E: Performed by: SURGERY

## 2021-05-12 PROCEDURE — 77010033678 HC OXYGEN DAILY

## 2021-05-12 PROCEDURE — 77030016151 HC PROTCTR LNS DFOG COVD -B: Performed by: SURGERY

## 2021-05-12 PROCEDURE — 82962 GLUCOSE BLOOD TEST: CPT

## 2021-05-12 PROCEDURE — 77030040361 HC SLV COMPR DVT MDII -B: Performed by: SURGERY

## 2021-05-12 PROCEDURE — 77030010513 HC APPL CLP LIG J&J -C: Performed by: SURGERY

## 2021-05-12 PROCEDURE — 74011000250 HC RX REV CODE- 250: Performed by: SURGERY

## 2021-05-12 PROCEDURE — 0FB14ZZ EXCISION OF RIGHT LOBE LIVER, PERCUTANEOUS ENDOSCOPIC APPROACH: ICD-10-PCS | Performed by: SURGERY

## 2021-05-12 PROCEDURE — 2709999900 HC NON-CHARGEABLE SUPPLY: Performed by: SURGERY

## 2021-05-12 PROCEDURE — 77030013292 HC BOWL MX PRSM J&J -A: Performed by: ANESTHESIOLOGY

## 2021-05-12 PROCEDURE — 77030002996 HC SUT SLK J&J -A: Performed by: SURGERY

## 2021-05-12 PROCEDURE — 77030000038 HC TIP SCIS LAPSCP SURI -B: Performed by: SURGERY

## 2021-05-12 PROCEDURE — 77030008518 HC TBNG INSUF ENDO STRY -B: Performed by: SURGERY

## 2021-05-12 PROCEDURE — 76060000035 HC ANESTHESIA 2 TO 2.5 HR: Performed by: SURGERY

## 2021-05-12 PROCEDURE — 74011250637 HC RX REV CODE- 250/637: Performed by: NURSE PRACTITIONER

## 2021-05-12 PROCEDURE — 77030002966 HC SUT PDS J&J -A: Performed by: SURGERY

## 2021-05-12 PROCEDURE — 88304 TISSUE EXAM BY PATHOLOGIST: CPT

## 2021-05-12 PROCEDURE — 47379 UNLISTED LAPS PX LIVER: CPT | Performed by: NURSE PRACTITIONER

## 2021-05-12 PROCEDURE — 47379 UNLISTED LAPS PX LIVER: CPT | Performed by: SURGERY

## 2021-05-12 PROCEDURE — 65660000000 HC RM CCU STEPDOWN

## 2021-05-12 PROCEDURE — 74011000258 HC RX REV CODE- 258: Performed by: SURGERY

## 2021-05-12 PROCEDURE — 77030005401 HC CATH RAD ARRO -A: Performed by: NURSE ANESTHETIST, CERTIFIED REGISTERED

## 2021-05-12 PROCEDURE — 77030008462 HC STPLR SKN PROX J&J -A: Performed by: SURGERY

## 2021-05-12 PROCEDURE — 77030034628 HC LIGASURE LAP SEAL DIV MD COVD -F: Performed by: SURGERY

## 2021-05-12 PROCEDURE — 76010000171 HC OR TIME 2 TO 2.5 HR INTENSV-TIER 1: Performed by: SURGERY

## 2021-05-12 PROCEDURE — 84295 ASSAY OF SERUM SODIUM: CPT

## 2021-05-12 PROCEDURE — 77030008606 HC TRCR ENDOSC KII AMR -B: Performed by: SURGERY

## 2021-05-12 PROCEDURE — 74011000258 HC RX REV CODE- 258: Performed by: NURSE PRACTITIONER

## 2021-05-12 PROCEDURE — 77030040922 HC BLNKT HYPOTHRM STRY -A: Performed by: ANESTHESIOLOGY

## 2021-05-12 PROCEDURE — 77030037088 HC TUBE ENDOTRACH ORAL NSL COVD-A: Performed by: NURSE ANESTHETIST, CERTIFIED REGISTERED

## 2021-05-12 PROCEDURE — 77030039425 HC BLD LARYNG TRULITE DISP TELE -A: Performed by: NURSE ANESTHETIST, CERTIFIED REGISTERED

## 2021-05-12 PROCEDURE — 77030038552 HC DRN WND MDII -A: Performed by: SURGERY

## 2021-05-12 PROCEDURE — 77030012770 HC TRCR OPT FX AMR -B: Performed by: SURGERY

## 2021-05-12 PROCEDURE — 77030031139 HC SUT VCRL2 J&J -A: Performed by: SURGERY

## 2021-05-12 PROCEDURE — 77030020407 HC IV BLD WRMR ST 3M -A: Performed by: ANESTHESIOLOGY

## 2021-05-12 PROCEDURE — 74011636637 HC RX REV CODE- 636/637: Performed by: NURSE PRACTITIONER

## 2021-05-12 PROCEDURE — 77030008756 HC TU IRR SUC STRY -B: Performed by: SURGERY

## 2021-05-12 PROCEDURE — 88307 TISSUE EXAM BY PATHOLOGIST: CPT

## 2021-05-12 PROCEDURE — 77030011450 HC HNDPC AR BM COVD -B: Performed by: SURGERY

## 2021-05-12 PROCEDURE — 76210000006 HC OR PH I REC 0.5 TO 1 HR: Performed by: SURGERY

## 2021-05-12 PROCEDURE — 77030020829: Performed by: SURGERY

## 2021-05-12 PROCEDURE — 74011250637 HC RX REV CODE- 250/637: Performed by: ANESTHESIOLOGY

## 2021-05-12 PROCEDURE — 74011250636 HC RX REV CODE- 250/636: Performed by: SURGERY

## 2021-05-12 PROCEDURE — 77030009407 HC ELECTRD ENDO COVD -B: Performed by: SURGERY

## 2021-05-12 PROCEDURE — 74011250636 HC RX REV CODE- 250/636: Performed by: ANESTHESIOLOGY

## 2021-05-12 RX ORDER — NALOXONE HYDROCHLORIDE 0.4 MG/ML
0.1 INJECTION, SOLUTION INTRAMUSCULAR; INTRAVENOUS; SUBCUTANEOUS AS NEEDED
Status: DISCONTINUED | OUTPATIENT
Start: 2021-05-12 | End: 2021-05-12 | Stop reason: HOSPADM

## 2021-05-12 RX ORDER — ENOXAPARIN SODIUM 100 MG/ML
30 INJECTION SUBCUTANEOUS DAILY
Status: DISCONTINUED | OUTPATIENT
Start: 2021-05-13 | End: 2021-05-13 | Stop reason: HOSPADM

## 2021-05-12 RX ORDER — OXYCODONE HYDROCHLORIDE 5 MG/1
10 TABLET ORAL
Status: COMPLETED | OUTPATIENT
Start: 2021-05-12 | End: 2021-05-12

## 2021-05-12 RX ORDER — OXYCODONE HYDROCHLORIDE 5 MG/1
5 TABLET ORAL
Status: DISCONTINUED | OUTPATIENT
Start: 2021-05-12 | End: 2021-05-13 | Stop reason: HOSPADM

## 2021-05-12 RX ORDER — NICARDIPINE HYDROCHLORIDE 0.1 MG/ML
INJECTION INTRAVENOUS AS NEEDED
Status: DISCONTINUED | OUTPATIENT
Start: 2021-05-12 | End: 2021-05-12 | Stop reason: HOSPADM

## 2021-05-12 RX ORDER — PROMETHAZINE HYDROCHLORIDE 25 MG/1
12.5 TABLET ORAL
Status: DISCONTINUED | OUTPATIENT
Start: 2021-05-12 | End: 2021-05-13 | Stop reason: HOSPADM

## 2021-05-12 RX ORDER — LIDOCAINE HYDROCHLORIDE ANHYDROUS AND DEXTROSE MONOHYDRATE .8; 5 G/100ML; G/100ML
INJECTION, SOLUTION INTRAVENOUS
Status: DISCONTINUED | OUTPATIENT
Start: 2021-05-12 | End: 2021-05-12 | Stop reason: HOSPADM

## 2021-05-12 RX ORDER — SODIUM CHLORIDE, SODIUM LACTATE, POTASSIUM CHLORIDE, CALCIUM CHLORIDE 600; 310; 30; 20 MG/100ML; MG/100ML; MG/100ML; MG/100ML
1000 INJECTION, SOLUTION INTRAVENOUS CONTINUOUS
Status: DISCONTINUED | OUTPATIENT
Start: 2021-05-12 | End: 2021-05-12 | Stop reason: HOSPADM

## 2021-05-12 RX ORDER — LIDOCAINE HYDROCHLORIDE 20 MG/ML
INJECTION, SOLUTION EPIDURAL; INFILTRATION; INTRACAUDAL; PERINEURAL AS NEEDED
Status: DISCONTINUED | OUTPATIENT
Start: 2021-05-12 | End: 2021-05-12 | Stop reason: HOSPADM

## 2021-05-12 RX ORDER — SODIUM CHLORIDE, SODIUM LACTATE, POTASSIUM CHLORIDE, CALCIUM CHLORIDE 600; 310; 30; 20 MG/100ML; MG/100ML; MG/100ML; MG/100ML
INJECTION, SOLUTION INTRAVENOUS
Status: DISCONTINUED | OUTPATIENT
Start: 2021-05-12 | End: 2021-05-12 | Stop reason: HOSPADM

## 2021-05-12 RX ORDER — SODIUM CHLORIDE 0.9 % (FLUSH) 0.9 %
5-40 SYRINGE (ML) INJECTION AS NEEDED
Status: DISCONTINUED | OUTPATIENT
Start: 2021-05-12 | End: 2021-05-13 | Stop reason: HOSPADM

## 2021-05-12 RX ORDER — LIDOCAINE HYDROCHLORIDE 10 MG/ML
0.1 INJECTION INFILTRATION; PERINEURAL AS NEEDED
Status: DISCONTINUED | OUTPATIENT
Start: 2021-05-12 | End: 2021-05-12 | Stop reason: HOSPADM

## 2021-05-12 RX ORDER — SODIUM CHLORIDE 0.9 % (FLUSH) 0.9 %
5-40 SYRINGE (ML) INJECTION AS NEEDED
Status: DISCONTINUED | OUTPATIENT
Start: 2021-05-12 | End: 2021-05-12 | Stop reason: HOSPADM

## 2021-05-12 RX ORDER — OXYCODONE HYDROCHLORIDE 5 MG/1
5 TABLET ORAL
Status: DISCONTINUED | OUTPATIENT
Start: 2021-05-12 | End: 2021-05-12 | Stop reason: HOSPADM

## 2021-05-12 RX ORDER — ONDANSETRON 2 MG/ML
4 INJECTION INTRAMUSCULAR; INTRAVENOUS
Status: DISCONTINUED | OUTPATIENT
Start: 2021-05-12 | End: 2021-05-13 | Stop reason: HOSPADM

## 2021-05-12 RX ORDER — BUPIVACAINE HYDROCHLORIDE AND EPINEPHRINE 5; 5 MG/ML; UG/ML
INJECTION, SOLUTION EPIDURAL; INTRACAUDAL; PERINEURAL AS NEEDED
Status: DISCONTINUED | OUTPATIENT
Start: 2021-05-12 | End: 2021-05-12 | Stop reason: HOSPADM

## 2021-05-12 RX ORDER — SODIUM CHLORIDE, SODIUM LACTATE, POTASSIUM CHLORIDE, CALCIUM CHLORIDE 600; 310; 30; 20 MG/100ML; MG/100ML; MG/100ML; MG/100ML
25 INJECTION, SOLUTION INTRAVENOUS CONTINUOUS
Status: DISCONTINUED | OUTPATIENT
Start: 2021-05-12 | End: 2021-05-13 | Stop reason: HOSPADM

## 2021-05-12 RX ORDER — HYDRALAZINE HYDROCHLORIDE 20 MG/ML
20 INJECTION INTRAMUSCULAR; INTRAVENOUS
Status: DISCONTINUED | OUTPATIENT
Start: 2021-05-12 | End: 2021-05-13 | Stop reason: HOSPADM

## 2021-05-12 RX ORDER — FENTANYL CITRATE 50 UG/ML
100 INJECTION, SOLUTION INTRAMUSCULAR; INTRAVENOUS AS NEEDED
Status: DISCONTINUED | OUTPATIENT
Start: 2021-05-12 | End: 2021-05-12 | Stop reason: HOSPADM

## 2021-05-12 RX ORDER — MIDAZOLAM HYDROCHLORIDE 1 MG/ML
2 INJECTION, SOLUTION INTRAMUSCULAR; INTRAVENOUS
Status: DISCONTINUED | OUTPATIENT
Start: 2021-05-12 | End: 2021-05-12 | Stop reason: HOSPADM

## 2021-05-12 RX ORDER — DIPHENHYDRAMINE HYDROCHLORIDE 50 MG/ML
12.5 INJECTION, SOLUTION INTRAMUSCULAR; INTRAVENOUS ONCE
Status: DISCONTINUED | OUTPATIENT
Start: 2021-05-12 | End: 2021-05-12 | Stop reason: HOSPADM

## 2021-05-12 RX ORDER — ONDANSETRON 2 MG/ML
4 INJECTION INTRAMUSCULAR; INTRAVENOUS ONCE
Status: DISCONTINUED | OUTPATIENT
Start: 2021-05-12 | End: 2021-05-12 | Stop reason: HOSPADM

## 2021-05-12 RX ORDER — PROPOFOL 10 MG/ML
INJECTION, EMULSION INTRAVENOUS AS NEEDED
Status: DISCONTINUED | OUTPATIENT
Start: 2021-05-12 | End: 2021-05-12 | Stop reason: HOSPADM

## 2021-05-12 RX ORDER — SODIUM CHLORIDE 0.9 % (FLUSH) 0.9 %
5-40 SYRINGE (ML) INJECTION EVERY 8 HOURS
Status: DISCONTINUED | OUTPATIENT
Start: 2021-05-12 | End: 2021-05-13 | Stop reason: HOSPADM

## 2021-05-12 RX ORDER — HYDROMORPHONE HYDROCHLORIDE 1 MG/ML
1 INJECTION, SOLUTION INTRAMUSCULAR; INTRAVENOUS; SUBCUTANEOUS
Status: DISCONTINUED | OUTPATIENT
Start: 2021-05-12 | End: 2021-05-13 | Stop reason: HOSPADM

## 2021-05-12 RX ORDER — ONDANSETRON 2 MG/ML
INJECTION INTRAMUSCULAR; INTRAVENOUS AS NEEDED
Status: DISCONTINUED | OUTPATIENT
Start: 2021-05-12 | End: 2021-05-12 | Stop reason: HOSPADM

## 2021-05-12 RX ORDER — ASPIRIN 81 MG/1
81 TABLET ORAL DAILY
Status: DISCONTINUED | OUTPATIENT
Start: 2021-05-13 | End: 2021-05-13 | Stop reason: HOSPADM

## 2021-05-12 RX ORDER — ROCURONIUM BROMIDE 10 MG/ML
INJECTION, SOLUTION INTRAVENOUS AS NEEDED
Status: DISCONTINUED | OUTPATIENT
Start: 2021-05-12 | End: 2021-05-12 | Stop reason: HOSPADM

## 2021-05-12 RX ORDER — LABETALOL HYDROCHLORIDE 5 MG/ML
INJECTION, SOLUTION INTRAVENOUS AS NEEDED
Status: DISCONTINUED | OUTPATIENT
Start: 2021-05-12 | End: 2021-05-12 | Stop reason: HOSPADM

## 2021-05-12 RX ORDER — SODIUM CHLORIDE, SODIUM LACTATE, POTASSIUM CHLORIDE, CALCIUM CHLORIDE 600; 310; 30; 20 MG/100ML; MG/100ML; MG/100ML; MG/100ML
75 INJECTION, SOLUTION INTRAVENOUS CONTINUOUS
Status: DISCONTINUED | OUTPATIENT
Start: 2021-05-12 | End: 2021-05-12 | Stop reason: HOSPADM

## 2021-05-12 RX ORDER — NEOSTIGMINE METHYLSULFATE 1 MG/ML
INJECTION, SOLUTION INTRAVENOUS AS NEEDED
Status: DISCONTINUED | OUTPATIENT
Start: 2021-05-12 | End: 2021-05-12 | Stop reason: HOSPADM

## 2021-05-12 RX ORDER — FENTANYL CITRATE 50 UG/ML
INJECTION, SOLUTION INTRAMUSCULAR; INTRAVENOUS AS NEEDED
Status: DISCONTINUED | OUTPATIENT
Start: 2021-05-12 | End: 2021-05-12 | Stop reason: HOSPADM

## 2021-05-12 RX ORDER — EPHEDRINE SULFATE/0.9% NACL/PF 50 MG/5 ML
SYRINGE (ML) INTRAVENOUS AS NEEDED
Status: DISCONTINUED | OUTPATIENT
Start: 2021-05-12 | End: 2021-05-12 | Stop reason: HOSPADM

## 2021-05-12 RX ORDER — ATORVASTATIN CALCIUM 20 MG/1
20 TABLET, FILM COATED ORAL
Status: DISCONTINUED | OUTPATIENT
Start: 2021-05-12 | End: 2021-05-13 | Stop reason: HOSPADM

## 2021-05-12 RX ORDER — GLYCOPYRROLATE 0.2 MG/ML
INJECTION INTRAMUSCULAR; INTRAVENOUS AS NEEDED
Status: DISCONTINUED | OUTPATIENT
Start: 2021-05-12 | End: 2021-05-12 | Stop reason: HOSPADM

## 2021-05-12 RX ORDER — SODIUM CHLORIDE 0.9 % (FLUSH) 0.9 %
5-40 SYRINGE (ML) INJECTION EVERY 8 HOURS
Status: DISCONTINUED | OUTPATIENT
Start: 2021-05-12 | End: 2021-05-12 | Stop reason: HOSPADM

## 2021-05-12 RX ORDER — KETOROLAC TROMETHAMINE 15 MG/ML
15 INJECTION, SOLUTION INTRAMUSCULAR; INTRAVENOUS
Status: DISCONTINUED | OUTPATIENT
Start: 2021-05-12 | End: 2021-05-13 | Stop reason: HOSPADM

## 2021-05-12 RX ORDER — NICARDIPINE HYDROCHLORIDE 0.1 MG/ML
INJECTION INTRAVENOUS
Status: DISCONTINUED | OUTPATIENT
Start: 2021-05-12 | End: 2021-05-12 | Stop reason: HOSPADM

## 2021-05-12 RX ORDER — PANTOPRAZOLE SODIUM 40 MG/1
40 TABLET, DELAYED RELEASE ORAL 2 TIMES DAILY
Status: DISCONTINUED | OUTPATIENT
Start: 2021-05-12 | End: 2021-05-13 | Stop reason: HOSPADM

## 2021-05-12 RX ORDER — HYDROMORPHONE HYDROCHLORIDE 1 MG/ML
0.5 INJECTION, SOLUTION INTRAMUSCULAR; INTRAVENOUS; SUBCUTANEOUS
Status: COMPLETED | OUTPATIENT
Start: 2021-05-12 | End: 2021-05-12

## 2021-05-12 RX ORDER — OXYCODONE HYDROCHLORIDE 5 MG/1
10 TABLET ORAL
Status: DISCONTINUED | OUTPATIENT
Start: 2021-05-12 | End: 2021-05-13 | Stop reason: HOSPADM

## 2021-05-12 RX ADMIN — HYDROMORPHONE HYDROCHLORIDE 0.5 MG: 1 INJECTION, SOLUTION INTRAMUSCULAR; INTRAVENOUS; SUBCUTANEOUS at 10:25

## 2021-05-12 RX ADMIN — Medication 4 MG: at 09:23

## 2021-05-12 RX ADMIN — OXYCODONE 10 MG: 5 TABLET ORAL at 10:33

## 2021-05-12 RX ADMIN — FENTANYL CITRATE 25 MCG: 50 INJECTION INTRAMUSCULAR; INTRAVENOUS at 08:22

## 2021-05-12 RX ADMIN — LIDOCAINE HYDROCHLORIDE 60 MG: 20 INJECTION, SOLUTION EPIDURAL; INFILTRATION; INTRACAUDAL; PERINEURAL at 07:20

## 2021-05-12 RX ADMIN — LIDOCAINE HYDROCHLORIDE 1 MG/KG/HR: 8 INJECTION, SOLUTION INTRAVENOUS at 07:36

## 2021-05-12 RX ADMIN — SODIUM CHLORIDE, SODIUM LACTATE, POTASSIUM CHLORIDE, AND CALCIUM CHLORIDE: 600; 310; 30; 20 INJECTION, SOLUTION INTRAVENOUS at 07:28

## 2021-05-12 RX ADMIN — SODIUM CHLORIDE, SODIUM LACTATE, POTASSIUM CHLORIDE, AND CALCIUM CHLORIDE 1000 ML: 600; 310; 30; 20 INJECTION, SOLUTION INTRAVENOUS at 06:01

## 2021-05-12 RX ADMIN — LABETALOL HYDROCHLORIDE 10 MG: 5 INJECTION INTRAVENOUS at 08:00

## 2021-05-12 RX ADMIN — PROPOFOL 130 MG: 10 INJECTION, EMULSION INTRAVENOUS at 07:20

## 2021-05-12 RX ADMIN — FENTANYL CITRATE 50 MCG: 50 INJECTION INTRAMUSCULAR; INTRAVENOUS at 09:36

## 2021-05-12 RX ADMIN — PROPOFOL 100 MG: 10 INJECTION, EMULSION INTRAVENOUS at 07:59

## 2021-05-12 RX ADMIN — AMPICILLIN SODIUM AND SULBACTAM SODIUM 3 G: 2; 1 INJECTION, POWDER, FOR SOLUTION INTRAMUSCULAR; INTRAVENOUS at 23:12

## 2021-05-12 RX ADMIN — NICARDIPINE HYDROCHLORIDE 200 MCG: 0.1 INJECTION, SOLUTION INTRAVENOUS at 08:39

## 2021-05-12 RX ADMIN — ROCURONIUM BROMIDE 10 MG: 10 INJECTION, SOLUTION INTRAVENOUS at 08:53

## 2021-05-12 RX ADMIN — SODIUM CHLORIDE, SODIUM LACTATE, POTASSIUM CHLORIDE, AND CALCIUM CHLORIDE 75 ML/HR: 600; 310; 30; 20 INJECTION, SOLUTION INTRAVENOUS at 12:19

## 2021-05-12 RX ADMIN — FENTANYL CITRATE 50 MCG: 50 INJECTION INTRAMUSCULAR; INTRAVENOUS at 07:54

## 2021-05-12 RX ADMIN — GLYCOPYRROLATE 0.6 MG: 0.2 INJECTION, SOLUTION INTRAMUSCULAR; INTRAVENOUS at 09:23

## 2021-05-12 RX ADMIN — ROCURONIUM BROMIDE 50 MG: 10 INJECTION, SOLUTION INTRAVENOUS at 07:20

## 2021-05-12 RX ADMIN — OXYCODONE 5 MG: 5 TABLET ORAL at 15:18

## 2021-05-12 RX ADMIN — ONDANSETRON 4 MG: 2 INJECTION INTRAMUSCULAR; INTRAVENOUS at 09:00

## 2021-05-12 RX ADMIN — AMPICILLIN SODIUM AND SULBACTAM SODIUM 3 G: 2; 1 INJECTION, POWDER, FOR SOLUTION INTRAMUSCULAR; INTRAVENOUS at 15:18

## 2021-05-12 RX ADMIN — Medication 10 ML: at 15:28

## 2021-05-12 RX ADMIN — HYDROMORPHONE HYDROCHLORIDE 0.5 MG: 1 INJECTION, SOLUTION INTRAMUSCULAR; INTRAVENOUS; SUBCUTANEOUS at 10:37

## 2021-05-12 RX ADMIN — HYDROMORPHONE HYDROCHLORIDE 0.5 MG: 1 INJECTION, SOLUTION INTRAMUSCULAR; INTRAVENOUS; SUBCUTANEOUS at 10:00

## 2021-05-12 RX ADMIN — KETOROLAC TROMETHAMINE 15 MG: 15 INJECTION, SOLUTION INTRAMUSCULAR; INTRAVENOUS at 21:19

## 2021-05-12 RX ADMIN — HUMAN INSULIN 6 UNITS: 100 INJECTION, SOLUTION SUBCUTANEOUS at 18:06

## 2021-05-12 RX ADMIN — HYDROMORPHONE HYDROCHLORIDE 0.5 MG: 1 INJECTION, SOLUTION INTRAMUSCULAR; INTRAVENOUS; SUBCUTANEOUS at 10:52

## 2021-05-12 RX ADMIN — HYDROMORPHONE HYDROCHLORIDE 0.5 MG: 1 INJECTION, SOLUTION INTRAMUSCULAR; INTRAVENOUS; SUBCUTANEOUS at 09:50

## 2021-05-12 RX ADMIN — FENTANYL CITRATE 50 MCG: 50 INJECTION INTRAMUSCULAR; INTRAVENOUS at 07:16

## 2021-05-12 RX ADMIN — AMPICILLIN SODIUM AND SULBACTAM SODIUM 3 G: 2; 1 INJECTION, POWDER, FOR SOLUTION INTRAMUSCULAR; INTRAVENOUS at 07:39

## 2021-05-12 RX ADMIN — ATORVASTATIN CALCIUM 20 MG: 20 TABLET, FILM COATED ORAL at 21:00

## 2021-05-12 RX ADMIN — HUMAN INSULIN 9 UNITS: 100 INJECTION, SOLUTION SUBCUTANEOUS at 23:19

## 2021-05-12 RX ADMIN — Medication 5 MG: at 07:40

## 2021-05-12 RX ADMIN — FENTANYL CITRATE 25 MCG: 50 INJECTION INTRAMUSCULAR; INTRAVENOUS at 08:24

## 2021-05-12 RX ADMIN — HYDROMORPHONE HYDROCHLORIDE 0.5 MG: 1 INJECTION, SOLUTION INTRAMUSCULAR; INTRAVENOUS; SUBCUTANEOUS at 11:02

## 2021-05-12 RX ADMIN — NICARDIPINE HYDROCHLORIDE 5 MG/HR: 0.1 INJECTION, SOLUTION INTRAVENOUS at 08:34

## 2021-05-12 NOTE — ANESTHESIA PROCEDURE NOTES
Arterial Line Placement    Start time: 5/12/2021 7:36 AM  End time: 5/12/2021 7:40 AM  Performed by: Cleotis Hammans, MD  Authorized by: Cleotis Hammans, MD     Pre-Procedure  Indications:  Arterial pressure monitoring and blood sampling  Preanesthetic Checklist: patient identified, risks and benefits discussed, anesthesia consent, site marked, patient being monitored, timeout performed and patient being monitored    Timeout Time: 07:36        Procedure:   Prep:  Chlorhexidine  Seldinger Technique?: Yes    Orientation:  Left  Location:  Radial artery  Catheter size:  20 G  Number of attempts:  1    Assessment:   Post-procedure:  Line secured and sterile dressing applied  Patient Tolerance:  Patient tolerated the procedure well with no immediate complications

## 2021-05-12 NOTE — PERIOP NOTES
TRANSFER - OUT REPORT:    Verbal report given to SANHCEZ Mims on Shai Li  being transferred to [facility] for routine post - op       Report consisted of patients Situation, Background, Assessment and   Recommendations(SBAR). Information from the following report(s) OR Summary, Procedure Summary, Intake/Output and MAR was reviewed with the receiving nurse. Lines:   Peripheral IV 05/12/21 Left Hand (Active)   Site Assessment Clean, dry, & intact 05/12/21 1033   Phlebitis Assessment 0 05/12/21 1033   Infiltration Assessment 0 05/12/21 1033   Dressing Status Clean, dry, & intact 05/12/21 1033   Dressing Type Tape;Transparent 05/12/21 1033   Hub Color/Line Status Patent;Pink 05/12/21 1033       Peripheral IV 05/12/21 Right Hand (Active)   Site Assessment Clean, dry, & intact 05/12/21 1033   Phlebitis Assessment 0 05/12/21 1033   Infiltration Assessment 0 05/12/21 1033   Dressing Status Clean, dry, & intact 05/12/21 1033   Dressing Type Tape;Transparent 05/12/21 1033   Hub Color/Line Status Patent 05/12/21 1033        Opportunity for questions and clarification was provided. Patient transported with:   O2 @ 2 liters    VTE prophylaxis orders have been written for Shai Li. Patient and family given floor number and nurses name.

## 2021-05-12 NOTE — PROGRESS NOTES
05/12/21 1205   Dual Skin Pressure Injury Assessment   Dual Skin Pressure Injury Assessment X   Second Care Provider (Based on [address] [place] [address]) RN Suzzane Seip   Skin Integumentary   Skin Integumentary (WDL) X    Pressure  Injury Documentation No Pressure Injury Noted-Pressure Ulcer Prevention Initiated   Skin Color Appropriate for ethnicity   Skin Condition/Temp Dry;Fragile; Warm   Skin Integrity Incision (comment); Scars (comment)  (scars, moles on trunk and limbs;)     Surgical site and drain to right of abd; dsg: c,d,i.

## 2021-05-12 NOTE — PROGRESS NOTES
Care Management Interventions  Mode of Transport at Discharge: Self  Transition of Care Consult (CM Consult): Discharge Planning  Discharge Durable Medical Equipment: No  Physical Therapy Consult: Yes  Occupational Therapy Consult: No  Speech Therapy Consult: No  Current Support Network: Own Home, Lives with Spouse  Confirm Follow Up Transport: Self   Resource Information Provided?: No  Discharge Location  Discharge Placement: Home    Social: CM attempted to see patient in room. Patient soundly sleeping. Patient admitted s/p liver resection for hepatocellular carcinoma. CM will attempt to see patient at a later time. DME: none documented    HD:no    DM: no    Pharmacy: [facility], [facility] or [facility] Mail delivery. Discharge plan/needs: PT has been consulted. CM will follow recommendations and discuss with patient and family. CM will continue to follow patient during hospitalization for discharge planning and needs. Please consult or notify CM of new needs.

## 2021-05-12 NOTE — ANESTHESIA PREPROCEDURE EVALUATION
Anesthetic History   No history of anesthetic complications            Review of Systems / Medical History  Patient summary reviewed, nursing notes reviewed and pertinent labs reviewed    Pulmonary        Sleep apnea: No treatment           Neuro/Psych   Within defined limits           Cardiovascular    Hypertension          CAD (stent 2008), CABG/stent, cardiac stents (2003) and hyperlipidemia    Exercise tolerance: <4 METS  Comments: Normal stress test 2/19.    GI/Hepatic/Renal     GERD (chronic pancreatitis)    Renal disease: CRI  Liver disease (liver mass, ?malig.)     Endo/Other    Diabetes: well controlled, type 2, using insulin    Arthritis     Other Findings   Comments: [address]         Physical Exam    Airway  Mallampati: III  TM Distance: < 4 cm  Neck ROM: normal range of motion   Mouth opening: Diminished (comment)     Cardiovascular  Regular rate and rhythm,  S1 and S2 normal,  no murmur, click, rub, or gallop  Rhythm: regular  Rate: normal         Dental  No notable dental hx       Pulmonary  Breath sounds clear to auscultation               Abdominal         Other Findings            Anesthetic Plan    ASA: 3  Anesthesia type: general    Monitoring Plan: Arterial line        Anesthetic plan and risks discussed with: Patient      CVL or LBIVx2

## 2021-05-12 NOTE — BRIEF OP NOTE
Brief Postoperative Note    Patient: De Payan  YOB: 1933  MRN: [id number]    Date of Procedure: 5/12/2021     Pre-Op Diagnosis: Hepatocellular carcinoma ([facility] [C22.0]    Post-Op Diagnosis: Same as preoperative diagnosis. Procedure(s):  LIVER RESECTION LAPAROSOPIC HAND ASSISTED (segment 6 segmentectomy)  Lap cely  Intraoperative U/S    Surgeon(s): Hermila Mccarthy MD    Surgical Assistant: Nurse Practitioner: Gurjit Victoria NP    Anesthesia: General     Estimated Blood Loss (mL): 084     Complications: None    Specimens:   ID Type Source Tests Collected by Time Destination   1 : Gallbladder Preservative Abdomen  Hermila Mccarthy MD 5/12/2021 8441 Pathology   2 : Segment 6 of Liver Fresh Abdomen  Hermila Mccarthy MD 5/12/2021 2544 Pathology        Implants: * No implants in log *    Drains:   Alyse [place] #1 05/12/21 Right Abdomen (Active)       Findings: solitary liver mass, no peritoneal disease.     Electronically Signed by Pooja Marsh MD on 5/12/2021 at 9:34 AM

## 2021-05-12 NOTE — ANESTHESIA POSTPROCEDURE EVALUATION
Procedure(s):  LIVER RESECTION LAPAROSOPIC HAND ASSISTED, CHOLECYSECTOMY. spinal, general    Anesthesia Post Evaluation      Multimodal analgesia: multimodal analgesia used between 6 hours prior to anesthesia start to PACU discharge  Patient location during evaluation: bedside  Patient participation: complete - patient participated  Level of consciousness: responsive to verbal stimuli  Pain management: adequate  Airway patency: patent  Anesthetic complications: no  Cardiovascular status: hemodynamically stable  Respiratory status: spontaneous ventilation  Hydration status: stable    Final Post Anesthesia Temperature Assessment:  Normothermia (36.0-37.5 degrees C)      INITIAL Post-op Vital signs:   Vitals Value Taken Time   /67 05/12/21 1138   Temp 36.9 °C (98.4 °F) 05/12/21 1033   Pulse 76 05/12/21 1149   Resp 15 05/12/21 1138   SpO2 89 % 05/12/21 1149   Vitals shown include unvalidated device data.

## 2021-05-12 NOTE — OP NOTES
[facility]  OPERATIVE REPORT    Name:  Noa Castillo  MR#:  [id number]  :  1933  ACCOUNT #:  [de-identified]  DATE OF SERVICE:  2021    PREOPERATIVE DIAGNOSIS:  Hepatocellular carcinoma. POSTOPERATIVE DIAGNOSIS:  Hepatocellular carcinoma. PROCEDURE PERFORMED:  1. Laparoscopic liver resection for segment VI segmentectomy with hand assistance. 2.  Laparoscopic cholecystectomy. 3.  Intraoperative ultrasound. SURGEON:  Karma Chacko MD    ASSISTANT:  Kate Marsh NP    ANESTHESIA: general    COMPLICATIONS:  none    SPECIMENS REMOVED:  As above    IMPLANTS:  Abhishek x 1    ESTIMATED BLOOD LOSS:  About 150 mL. INDICATION:  This is an 80year-old relatively healthy person who had a liver mass which eventually confirmed by biopsy to be hepatocellular carcinoma. He is in relatively good health, and he has strong desire to remove this for potential cure and he understood risks and benefits, agreed to proceed. FINDINGS:  This appeared to be a solitary liver lesion. No evidence of peritoneal disease. PROCEDURE:  After informed consent obtained, the patient brought into the operating room, left in supine position. General anesthesia was administered. The patient's abdomen was prepped and draped in usual routine fashion. We first made a minilaparotomy incision at the right subcostal area and dissection carried through the abdominal wall. Peritoneal cavity was entered and then the hand-assisted port was placed and then I placed additional trocars along the right subcostal line, a 12-mm trocar at the epigastrium, two 5-mm on either side of the hand-assisted port. First, I took down the falciform ligament and the liver was mobilized and then a large mass is easily visible in segment VI. Intraoperative ultrasound did not identify any other lesions, and his gallbladder is very close to the mass and very distended.   We decided to perform the cholecystectomy first.  The gallbladder retracted cephalad and the junction of infundibulum and cystic duct area was first dissected. The duct was clipped and divided and then the cystic artery was identified. This was clipped and divided and then the gallbladder removed from the liver bed with cautery device and removed from the minilaparotomy site. This allowed full visualization of the mass in the segment VI, and we used a bipolar device and the liver parenchyma was divided with bipolar. The pedicle to segment VI was carefully identified and divided. During dissections, we were able to palpate the mass, so we were able to get at least a centimeter margin. After the liver parenchyma divided, the mass was able to be removed from the minilaparotomy incision, then the surgical site inspected. I used argon beam to cauterize the liver cutting surface for hemostasis and also get additional margin. Lastly, we left a #19 Abhishek drain in the surgical field and there was no evidence of bleeding or bile leak at the end of the case, and the minilaparotomy incision closed on the posterior fascia with 0 Vicryl stitch and then 0 looped PDS was used to run the anterior fascia. All skin incision closed with staples. The patient tolerated the procedure well, transferred to recovery room in stable condition. All the instrument count and lap count were correct. As noted, Mely Yun is the first assistant in this case. She offered excellent exposure and made the surgery quicker and safer.       Rita Melendez MD      [id number]  D:  05/12/2021 9:45  T:  05/12/2021 19:39  JOB #:  [id number]

## 2021-05-13 VITALS
RESPIRATION RATE: 17 BRPM | BODY MASS INDEX: 26.57 KG/M2 | DIASTOLIC BLOOD PRESSURE: 70 MMHG | SYSTOLIC BLOOD PRESSURE: 159 MMHG | HEIGHT: 69 IN | HEART RATE: 59 BPM | OXYGEN SATURATION: 93 % | WEIGHT: 179.4 LBS | TEMPERATURE: 98.3 F

## 2021-05-13 LAB
ALBUMIN SERPL-MCNC: 3.1 G/DL (ref 3.2–4.6)
ALBUMIN/GLOB SERPL: 0.8 {RATIO} (ref 1.2–3.5)
ALP SERPL-CCNC: 63 U/L (ref 50–136)
ALT SERPL-CCNC: 91 U/L (ref 12–65)
ANION GAP SERPL CALC-SCNC: 8 MMOL/L (ref 7–16)
AST SERPL-CCNC: 105 U/L (ref 15–37)
BASOPHILS # BLD: 0 K/UL (ref 0–0.2)
BASOPHILS NFR BLD: 0 % (ref 0–2)
BILIRUB SERPL-MCNC: 0.6 MG/DL (ref 0.2–1.1)
BUN SERPL-MCNC: 30 MG/DL (ref 8–23)
CALCIUM SERPL-MCNC: 8.6 MG/DL (ref 8.3–10.4)
CHLORIDE SERPL-SCNC: 109 MMOL/L (ref 98–107)
CO2 SERPL-SCNC: 24 MMOL/L (ref 21–32)
CREAT SERPL-MCNC: 1.89 MG/DL (ref 0.8–1.5)
DIFFERENTIAL METHOD BLD: ABNORMAL
EOSINOPHIL # BLD: 0.1 K/UL (ref 0–0.8)
EOSINOPHIL NFR BLD: 0 % (ref 0.5–7.8)
ERYTHROCYTE [DISTWIDTH] IN BLOOD BY AUTOMATED COUNT: 13.9 % (ref 11.9–14.6)
GLOBULIN SER CALC-MCNC: 3.7 G/DL (ref 2.3–3.5)
GLUCOSE BLD STRIP.AUTO-MCNC: 155 MG/DL (ref 65–100)
GLUCOSE BLD STRIP.AUTO-MCNC: 205 MG/DL (ref 65–100)
GLUCOSE SERPL-MCNC: 222 MG/DL (ref 65–100)
HCT VFR BLD AUTO: 39.6 % (ref 41.1–50.3)
HGB BLD-MCNC: 12.3 G/DL (ref 13.6–17.2)
IMM GRANULOCYTES # BLD AUTO: 0.1 K/UL (ref 0–0.5)
IMM GRANULOCYTES NFR BLD AUTO: 0 % (ref 0–5)
LYMPHOCYTES # BLD: 0.5 K/UL (ref 0.5–4.6)
LYMPHOCYTES NFR BLD: 3 % (ref 13–44)
MCH RBC QN AUTO: 27.9 PG (ref 26.1–32.9)
MCHC RBC AUTO-ENTMCNC: 31.1 G/DL (ref 31.4–35)
MCV RBC AUTO: 89.8 FL (ref 79.6–97.8)
MONOCYTES # BLD: 0.9 K/UL (ref 0.1–1.3)
MONOCYTES NFR BLD: 6 % (ref 4–12)
NEUTS SEG # BLD: 12.5 K/UL (ref 1.7–8.2)
NEUTS SEG NFR BLD: 90 % (ref 43–78)
NRBC # BLD: 0 K/UL (ref 0–0.2)
PLATELET # BLD AUTO: 213 K/UL (ref 150–450)
PMV BLD AUTO: 10.7 FL (ref 9.4–12.3)
POTASSIUM SERPL-SCNC: 4.9 MMOL/L (ref 3.5–5.1)
PROT SERPL-MCNC: 6.8 G/DL (ref 6.3–8.2)
RBC # BLD AUTO: 4.41 M/UL (ref 4.23–5.6)
SERVICE CMNT-IMP: ABNORMAL
SERVICE CMNT-IMP: ABNORMAL
SODIUM SERPL-SCNC: 141 MMOL/L (ref 136–145)
WBC # BLD AUTO: 13.9 K/UL (ref 4.3–11.1)

## 2021-05-13 PROCEDURE — 85025 COMPLETE CBC W/AUTO DIFF WBC: CPT

## 2021-05-13 PROCEDURE — 82962 GLUCOSE BLOOD TEST: CPT

## 2021-05-13 PROCEDURE — 80053 COMPREHEN METABOLIC PANEL: CPT

## 2021-05-13 PROCEDURE — 74011250636 HC RX REV CODE- 250/636: Performed by: NURSE PRACTITIONER

## 2021-05-13 PROCEDURE — 97530 THERAPEUTIC ACTIVITIES: CPT

## 2021-05-13 PROCEDURE — 36415 COLL VENOUS BLD VENIPUNCTURE: CPT

## 2021-05-13 PROCEDURE — 2709999900 HC NON-CHARGEABLE SUPPLY

## 2021-05-13 PROCEDURE — 74011250637 HC RX REV CODE- 250/637: Performed by: NURSE PRACTITIONER

## 2021-05-13 PROCEDURE — 97161 PT EVAL LOW COMPLEX 20 MIN: CPT

## 2021-05-13 PROCEDURE — 74011636637 HC RX REV CODE- 636/637: Performed by: NURSE PRACTITIONER

## 2021-05-13 RX ADMIN — ASPIRIN 81 MG: 81 TABLET ORAL at 08:55

## 2021-05-13 RX ADMIN — ENOXAPARIN SODIUM 30 MG: 30 INJECTION SUBCUTANEOUS at 08:55

## 2021-05-13 RX ADMIN — Medication 10 ML: at 14:11

## 2021-05-13 RX ADMIN — PANTOPRAZOLE SODIUM 40 MG: 40 TABLET, DELAYED RELEASE ORAL at 08:54

## 2021-05-13 RX ADMIN — HUMAN INSULIN 6 UNITS: 100 INJECTION, SOLUTION SUBCUTANEOUS at 05:04

## 2021-05-13 RX ADMIN — HUMAN INSULIN 3 UNITS: 100 INJECTION, SOLUTION SUBCUTANEOUS at 12:00

## 2021-05-13 NOTE — PROGRESS NOTES
Care Management Interventions  Mode of Transport at Discharge: Self  Transition of Care Consult (CM Consult): Discharge Planning  Discharge Durable Medical Equipment: No  Physical Therapy Consult: Yes  Occupational Therapy Consult: No  Speech Therapy Consult: No  Current Support Network: Own Home, Lives with Spouse  Confirm Follow Up Transport: Self   Resource Information Provided?: No  Discharge Location  Discharge Placement: Home    Patient to discharge home today. No CM needs have been identified. All milestones for discharge have been met. Patient to transport home with family.

## 2021-05-13 NOTE — PROGRESS NOTES
5/13/2021    PLAN:  Continue present care  DIET DIABETIC CONSISTENT CARB Regular  IVFs  SCD/IS/Protonix for prophylactic measures  SCD's or Sequential Compression Device and Lovenox  OOB and ambulate  Pain/nausea control  Monitor labs  DC Mccarthy  Ambulate  Home later today            ASSESSMENT:  Admit Date: 5/12/2021    Procedure(s):  LIVER RESECTION LAPAROSOPIC HAND ASSISTED, CHOLECYSECTOMY      SUBJECTIVE:  05/13/21- 1 Day Post-Op awake in bed, no complaints. Pain controlled. Harlin Abo with serosanguineous drainage  Noted. Dressing C/D/I. Tolerating diet. Will ambulate today and remove mccarthy.  VSS    OBJECTIVE:  Patient Vitals for the past 24 hrs:   Temp Pulse Resp BP SpO2   05/13/21 0717 97.4 °F (36.3 °C) 60 17 130/61 95 %   05/13/21 0415 97.7 °F (36.5 °C) 71 16 (!) 149/67 98 %   05/12/21 2318 97.6 °F (36.4 °C) 65 16 (!) 150/68 99 %   05/12/21 1929 97.6 °F (36.4 °C) 65 16 (!) 158/71 98 %   05/12/21 1600 -- (!) 55 -- -- --   05/12/21 1556 97.3 °F (36.3 °C) (!) 52 16 (!) 146/70 99 %   05/12/21 1235 97.4 °F (36.3 °C) (!) 58 15 (!) 150/75 95 %   05/12/21 1138 -- 74 15 (!) 151/67 94 %   05/12/21 1124 -- 64 16 (!) 158/70 94 %   05/12/21 1119 -- 68 14 -- 95 %   05/12/21 1109 -- (!) 58 16 (!) 155/71 94 %   05/12/21 1053 -- 71 14 (!) 154/72 97 %   05/12/21 1038 -- (!) 59 16 (!) 147/73 94 %   05/12/21 1033 98.4 °F (36.9 °C) 73 14 (!) 145/72 97 %   05/12/21 1028 -- 62 14 (!) 145/70 98 %   05/12/21 1024 -- (!) 58 15 (!) 148/71 95 %   05/12/21 1019 -- (!) 59 16 (!) 145/71 98 %   05/12/21 1013 -- 69 16 (!) 143/70 95 %   05/12/21 1008 -- 68 17 139/68 96 %   05/12/21 1003 -- 71 16 134/66 96 %   05/12/21 0958 -- 70 15 (!) 142/69 95 %   05/12/21 0953 -- 72 16 136/68 95 %   05/12/21 0948 -- 72 16 138/65 93 %   05/12/21 0944 98 °F (36.7 °C) 75 14 (!) 143/52 93 %   05/12/21 0943 -- 77 -- 136/63 94 %         Date 05/12/21 0700 - 05/13/21 0659 05/13/21 0700 - 05/14/21 5850 Shift 9924-7920 3348-5545 24 Hour Total 3725-7577 9494-8767 24 Hour Total   INTAKE   I.V.(mL/kg/hr) 1875(1.9) 460(0.5) 2335(1.2)        I.V.         Volume (lactated Ringers infusion) 300  300        Volume (lactated Ringers infusion 1,000 mL) 1000  1000      Shift Total(mL/kg) 1875(23) 460(5.7) 2335(28.7)      OUTPUT   Urine(mL/kg/hr) 1525(1.6) 675(0.7) 2200(1.1) 150  150     Urine Output 550  550        Urine Output (mL) (Urinary Catheter 05/12/21 2- way)  150  150   Drains 155 40 195        Output (ml) (Farrukh Drain #1 05/12/21 Right Abdomen) 155 40 195      Blood 150  150        Estimated Blood Loss 150  150      Shift Total(mL/kg) 1830(22.5) 715(8.8) 2545(31.3) 150(1.8)  150(1.8)   NET 45 -255 -210 -150  -150   Weight (kg) 81.4 81.4 81.4 81.4 81.4 81.4            General:          No acute distress    Lungs:             Even and unlabored   Heart:              RRR  Abdomen:        Soft, Non distended, appropriately tender, farrukh maintained with 195ml/24 hours  Extremities:     No cyanosis, clubbing or edema  Neurologic:      No focal deficits           Labs:    Recent Labs     05/13/21  0412   WBC 13.9*   HGB 12.3*         K 4.9   *   CO2 24   BUN 30*   CREA 1.89*   *   TBILI 0.6   ALT 91*   AP 63         Sera Kraft NP

## 2021-05-13 NOTE — PROGRESS NOTES
ACUTE PHYSICAL THERAPY GOALS:  (Developed with and agreed upon by patient and/or caregiver. )  LT. Pt will be able to perform bed mobility and transfers with independence in order to be able to safely function within their home within 7 treatment days. 2. Pt will be able to ambulate a minimum of 500 ft with modified independence and use of least restrictive device in order to return to home and community ambulation within 7 treatment days. 3. Pt will be able to ambulate up/down 3-5 stairs with modified independence in order to access his/her home and community safely within 7 treatment days. 4. Pt will demonstrate good standing and normal sitting balance with independence in order to safely perform functional mobility and ADLS within 7 treatment days.        PHYSICAL THERAPY ASSESSMENT: Initial Assessment, Daily Note and AM PT Treatment Day # 1      Surya Zuniga is a 80 y.o. male   PRIMARY DIAGNOSIS: <principal problem not specified>  Hepatocellular carcinoma (HCC) [C22.0]  Procedure(s) (LRB):  LIVER RESECTION LAPAROSOPIC HAND ASSISTED, CHOLECYSECTOMY (N/A)  1 Day Post-Op  Reason for Referral:    ICD-10: Treatment Diagnosis: Generalized Muscle Weakness (M62.81)  Other abnormalities of gait and mobility (R26.89)  [place] / [place] / Product Type: Medicare /     ASSESSMENT:     REHAB RECOMMENDATIONS:   Recommendation to date pending progress:  Setting:   No further skilled therapy   Equipment:    Rolling Walker-states his wife has one at home he can use     PRIOR LEVEL OF FUNCTION:  (Prior to Hospitalization) INITIAL/CURRENT LEVEL OF FUNCTION:  (Most Recently Demonstrated)   Bed Mobility:   Independent  Sit to Stand:   Independent  Transfers:   Independent  Gait/Mobility:   Independent Bed Mobility:   Standby Assistance  Sit to Stand:  [facility] [employer] Assistance  Transfers:   Standby Assistance  Gait/Mobility:   Standby Assistance     ASSESSMENT:   Cyril Orta is an 80year old male s/p liver resection laparoscopic sx. Pt demonstrates some generalized weakness and decreased activity tolerance. He also demonstrates decreased balance compared to his baseline and needs to use RW for stability with gait. Pt states that at his baseline he has vertigo and takes changes in position slow due to dizziness. Pt was able to ambulate 250' with SBA and use of the RW without any LOB or instability noted. Pt is [place] and appears to hear better out of the L ear. Pt is functioning slightly below baseline due to recent procedure and would continue to benefit from skilled PT to facilitate improvements in functional mobility, overall endurance and activity tolerance. SUBJECTIVE:   Mr. Cyril Orta states, \"I'm a little hard of hearing. \"    SOCIAL HISTORY/LIVING ENVIRONMENT: Pt ambulates independently and is independent with all ADLs  Home Environment: Private residence  One/Two Story Residence: One story  Living Alone: No  Support Systems: Family member(s)  OBJECTIVE:     PAIN: VITAL SIGNS: LINES/DRAINS:   Pre Treatment: Pain Screen  Pain Scale 1: Numeric (0 - 10)  Pain Intensity 1: 0  Post Treatment: 0   [place] Catheter and IV  O2 Device: None (Room air)     GROSS EVALUATION:   Within Functional Limits Abnormal/ Functional Abnormal/ Non-Functional (see comments) Not Tested Comments:   AROM [x] [] [] []    PROM [] [] [] []    Strength [x] [] [] [] Grossly 4/5 BLE   Balance [] [x] [] []    Posture [] [x] [] []    Sensation [x] [] [] []    Coordination [] [] [] []    Tone [] [] [] []    Edema [] [] [] []    Activity Tolerance [] [x] [] []     [] [] [] []      COGNITION/  PERCEPTION: Intact Impaired   (see comments) Comments:   Orientation [x] []    Vision [] [x]    Hearing [] [x]    Command Following [x] []    Safety Awareness [x] []     [] []      MOBILITY: I Mod I S SBA CGA Min Mod Max Total  NT x2 Comments:   Bed Mobility    Rolling [] [] [] [x] [] [] [] [] [] [] []    Supine to Sit [] [] [] [x] [] [] [] [] [] [] []    Scooting [] [] [] [] [] [] [] [] [] [x] []    Sit to Supine [] [] [] [] [] [] [] [] [] [x] []    Transfers    Sit to Stand [] [] [] [x] [] [] [] [] [] [] []    Bed to Chair [] [] [] [x] [] [] [] [] [] [] []    Stand to Sit [] [] [] [x] [] [] [] [] [] [] []    I=Independent, Mod I=Modified Independent, S=Supervision, SBA=Standby Assistance, CGA=Contact Guard Assistance,   Min=Minimal Assistance, Mod=Moderate Assistance, Max=Maximal Assistance, Total=Total Assistance, NT=Not Tested  GAIT: I Mod I S SBA CGA Min Mod Max Total  NT x2 Comments:   Level of Assistance [] [] [] [x] [] [] [] [] [] [] []    Distance 250'    DME Rolling Walker    Gait Quality Decreased gait speed    Weightbearing Status N/A     I=Independent, Mod I=Modified Independent, S=Supervision, SBA=Standby Assistance, CGA=Contact Guard Assistance,   Min=Minimal Assistance, Mod=Moderate Assistance, Max=Maximal Assistance, Total=Total Assistance, NT=Not Tested    [address] [facility] [address] [id number] [facility]       How much difficulty does the patient currently have. .. Unable A Lot A Little None   1. Turning over in bed (including adjusting bedclothes, sheets and blankets)? [] 1   [] 2   [] 3   [x] 4   2. Sitting down on and standing up from a chair with arms ( e.g., wheelchair, bedside commode, etc.)   [] 1   [] 2   [] 3   [x] 4   3. Moving from lying on back to sitting on the side of the bed? [] 1   [] 2   [] 3   [x] 4   How much help from another person does the patient currently need. .. Total A Lot A Little None   4. Moving to and from a bed to a chair (including a wheelchair)? [] 1   [] 2   [] 3   [x] 4   5. Need to walk in hospital room? [] 1   [] 2   [x] 3   [] 4   6. Climbing 3-5 steps with a railing? [] 1   [] 2   [x] 3   [] 4   © 2007, Trustees of [address] [place] [address] [address] [address], under license to [facility].  All rights reserved     Score:  Initial: 22 Most Recent: X (Date: -- ) Interpretation of Tool:  Represents activities that are increasingly more difficult (i.e. Bed mobility, Transfers, Gait). PLAN:   FREQUENCY/DURATION: PT Plan of Care: 3 times/week for duration of hospital stay or until stated goals are met, whichever comes first.    PROBLEM LIST:   (Skilled intervention is medically necessary to address:)  1. Decreased ADL/Functional Activities  2. Decreased Activity Tolerance  3. Decreased Balance  4. Decreased Gait Ability  5. Decreased Strength   INTERVENTIONS PLANNED:   (Benefits and precautions of physical therapy have been discussed with the patient.)  1. Therapeutic Activity  2. Therapeutic Exercise/HEP  3. Neuromuscular Re-education  4. Gait Training  5. Manual Therapy  6. Education     TREATMENT:     EVALUATION: Low Complexity : (Untimed Charge)    TREATMENT:   ($$ Therapeutic Activity: 8-22 mins    )  Therapeutic Activity (8 Minutes): Therapeutic activity included Rolling, Supine to Sit, Transfer Training, Ambulation on level ground, Sitting balance  and Standing balance to improve functional Mobility, Strength and Activity tolerance.     TREATMENT GRID:  N/A    AFTER TREATMENT POSITION/PRECAUTIONS:  Chair and Needs within reach    INTERDISCIPLINARY COLLABORATION:  RN/PCT and PT/PTA    TOTAL TREATMENT DURATION:  PT Patient Time In/Time Out  Time In: 1115  Time Out: [address]

## 2021-05-13 NOTE — PROGRESS NOTES
END OF SHIFT NOTE:    INTAKE/OUTPUT  05/12 0701 - 05/13 0700  In: 2335 [I.V.:2335]  Out: 4940 [Urine:2200; Drains:195]  Voiding: YES  Catheter: YES  Drain:   Shyrl Bonner #1 05/12/21 Right Abdomen (Active)   Site Assessment Clean, dry, & intact 05/13/21 0453   Dressing Status Clean, dry, & intact 05/13/21 0453   Drainage Description Sanguinous 05/13/21 0453   Abhishek Drain Airleak No 05/13/21 0453   Status Patent;Draining 05/13/21 0453   Output (ml) 30 ml 05/13/21 0453               Flatus: Patient does have flatus present. Stool:  0 occurrences. Characteristics:       Emesis: 0 occurrences. Characteristics:        VITAL SIGNS  Patient Vitals for the past 12 hrs:   Temp Pulse Resp BP SpO2   05/13/21 0415 97.7 °F (36.5 °C) 71 16 (!) 149/67 98 %   05/12/21 2318 97.6 °F (36.4 °C) 65 16 (!) 150/68 99 %   05/12/21 1929 97.6 °F (36.4 °C) 65 16 (!) 158/71 98 %       Pain Assessment  Pain Intensity 1: 0 (05/12/21 2220)  Pain Location 1: Abdomen  Pain Intervention(s) 1: Medication (see MAR)  Patient Stated Pain Goal: 0    Ambulating  No    Shift report given to oncoming nurse at the bedside.     Aida Che RN

## 2021-05-13 NOTE — PROGRESS NOTES
Problem: Falls - Risk of  Goal: *Absence of Falls  Description: Document [place] Fall Risk and appropriate interventions in the flowsheet.   Outcome: Progressing Towards Goal  Note: Fall Risk Interventions:            Medication Interventions: Patient to call before getting OOB, Teach patient to arise slowly    Elimination Interventions: Call light in reach, Patient to call for help with toileting needs              Problem: Patient Education: Go to Patient Education Activity  Goal: Patient/Family Education  Outcome: Progressing Towards Goal

## 2021-05-13 NOTE — DISCHARGE SUMMARY
Bonifacio Mohs  MRN: [id number]     : 1933     Age: 80 y.o. Admit date: 2021     Discharge date: 2021   Attending Physician: Dr. Liss Garnett MD  Primary Discharge Diagnosis:   Active Problems:    Hepatocellular carcinoma ([facility] [address] (2021)      Primary Operations or Procedures Performed :  Procedure(s):  LIVER RESECTION LAPAROSOPIC HAND ASSISTED, CHOLECYSECTOMY     Brief History and Reason for Admission: Bonifacio Mohs was admitted with the following history of present illness. Bonifacio Mohs is a 80 y.o. male referred to the office by Dr. Malena Almonte for [address] This was an incidental finding on CT urogram.  Patient complains of some right flank pain- this is the same side as removed kidney stone. He denies any other symptoms. Denies any history of hepatitis. Denies any cardiopulmonary issues. States stress test 2 years ago negative. Denies smoking and quit drinking 20 years ago and denies ever being a heavy drinker.     Family history of cancer- MGM colon cancer age [de-identified]  Medical history includes Diabetes Mellitus , CAD, Cardiac stents and CKD, and chronic pancreatitis.,  Surgery includes- Appendectomy, cardiac stent in  bilateral inguinal hernia repairs. does take blood thinners. - 81mg ASA               Hospital Course:  Uneventful for this operation. Discharged on post operative day 1. The patient progressed satisfactorily meeting milestones necessary for successful discharge including tolerating a diet, adequate mobility, adequate pain control- discharging home without narcotics- patient states pain controlled and does not need. Patient was deemed a good candidate for discharge at the time of morning rounding. They are to follow up as indicated in their provided discharge paperwork. The patient helped develop and voices understanding with the plan of care. They are amenable without reservations at this time to moving forward with discharge.        Condition at Discharge: good    Discharge Medications:   Current Discharge Medication List      CONTINUE these medications which have NOT CHANGED    Details   aspirin delayed-release 81 mg tablet Take 81 mg by mouth daily. cholecalciferol (Vitamin D3) 25 mcg (1,000 unit) cap Take  by mouth daily. pantoprazole (PROTONIX) 40 mg tablet Take 1 Tab by mouth two (2) times a day. Qty: 60 Tab, Refills: 1      lipase-protease-amylase (Creon) 24,000-76,000 -120,000 unit capsule Take 2 Caps by mouth three (3) times daily (with meals). 1-2 cap TID with meals and 1 cap with each snack      insulin glargine (Lantus U-100 Insulin) 100 unit/mL injection 25 Units by SubCUTAneous route two (2) times a day. Adjusted to meal CHO any range between 15-25 units      atorvastatin (Lipitor) 20 mg tablet Take 20 mg by mouth nightly. insulin aspart (NOVOLOG FLEXPEN U-100 INSULIN SC) 15 Units by SubCUTAneous route Before breakfast, lunch, and dinner. Adjusted to meal CHO any range between 5-15 units      fluticasone propionate (FLONASE) 50 mcg/actuation nasal spray Use 2 sprays in each nostril daily  Qty: 1 Bottle, Refills: 5               Disposition/Discharge Instructions/Follow-up Care: Follow-up with Dr. Sharon Wall.  Keep incisions clean and dry, may remove dressing in AM and leave off unless draining- then ok to place dry dressing. Continue dry dressing over old drain site for 2 days until drainage stops- ok to remove this dressing and shower then place dressing back if draining. Do not apply lotions, creams or ointments to incisions.     Diet - as tolerated  Activity - ambulate - as tolerated - no heavy lifting >20lb. May shower - no tub baths or soaking/submerging.       Resume home medications.     Take stool softeners to avoid constipation     If problems or questions arise, please call our office at [phone].     Greater than 30 minutes were spent discharging the patient        Signed:  Karla Crandall NP  5/13/2021 2:40 PM

## 2021-05-13 NOTE — PROGRESS NOTES
END OF SHIFT NOTE:    INTAKE/OUTPUT  No intake/output data recorded. Voiding: NO  Catheter: YES  Drain:   Beverlymariana Sanford #1 05/12/21 Right Abdomen (Active)   Site Assessment Clean, dry, & intact 05/12/21 1930   Dressing Status Clean, dry, & intact 05/12/21 1930   Drainage Description Sanguinous 05/12/21 1930   Abhishek Drain Airleak No 05/12/21 1930   Status Patent; Charged;Draining 05/12/21 1930   Output (ml) 10 ml 05/12/21 1930     Flatus: Patient does not have flatus present. Stool:  0 occurrences. Characteristics:       Emesis: 0 occurrences. Characteristics:        VITAL SIGNS  Patient Vitals for the past 12 hrs:   Temp Pulse Resp BP SpO2   05/12/21 1929 97.6 °F (36.4 °C) 65 16 (!) 158/71 98 %   05/12/21 1600 -- (!) 55 -- -- --   05/12/21 1556 97.3 °F (36.3 °C) (!) 52 16 (!) 146/70 99 %   05/12/21 1235 97.4 °F (36.3 °C) (!) 58 15 (!) 150/75 95 %   05/12/21 1138 -- 74 15 (!) 151/67 94 %   05/12/21 1124 -- 64 16 (!) 158/70 94 %   05/12/21 1119 -- 68 14 -- 95 %   05/12/21 1109 -- (!) 58 16 (!) 155/71 94 %   05/12/21 1053 -- 71 14 (!) 154/72 97 %   05/12/21 1038 -- (!) 59 16 (!) 147/73 94 %   05/12/21 1033 98.4 °F (36.9 °C) 73 14 (!) 145/72 97 %   05/12/21 1028 -- 62 14 (!) 145/70 98 %   05/12/21 1024 -- (!) 58 15 (!) 148/71 95 %   05/12/21 1019 -- (!) 59 16 (!) 145/71 98 %   05/12/21 1013 -- 69 16 (!) 143/70 95 %   05/12/21 1008 -- 68 17 139/68 96 %   05/12/21 1003 -- 71 16 134/66 96 %   05/12/21 0958 -- 70 15 (!) 142/69 95 %   05/12/21 0953 -- 72 16 136/68 95 %   05/12/21 0948 -- 72 16 138/65 93 %   05/12/21 0944 98 °F (36.7 °C) 75 14 (!) 143/52 93 %   05/12/21 0943 -- 77 -- 136/63 94 %       Pain Assessment  Pain Intensity 1: 0 (05/12/21 1930)     Pain Intervention(s) 1: Medication (see MAR)  Patient Stated Pain Goal: 3    Ambulating  No    Shift report given to oncoming nurse at the bedside.     Santiago Wells RN

## 2021-05-13 NOTE — PROGRESS NOTES
Pt's D/C instructions completed. Verbalized understanding of all instructions including diet, activity, s/sx to alert MD, medications, wound care, and f/u appointment. Family at [facility].

## 2021-05-13 NOTE — DISCHARGE INSTRUCTIONS
Dr Filiberto Gregory Discharge Instructions/Follow-up Plans:     MD Instructions:     Follow-up with Dr. Filiberto Gregory.  Keep incisions clean and dry, may remove dressing in AM and leave off unless draining- then ok to place dry dressing. Continue dry dressing over old drain site for 2 days until drainage stops- ok to remove this dressing and shower then place dressing back if draining. Do not apply lotions, creams or ointments to incisions.     Diet - as tolerated  Activity - ambulate - as tolerated - no heavy lifting >20lb. May shower - no tub baths or soaking/submerging.       Resume home medications. Take stool softeners to avoid constipation     If problems or questions arise, please call our office at [phone].     Greater than 30 minutes were spent discharging the patient         Liver Resection: What to Expect at [facility]  Liver resection is surgery to remove a piece of the liver. Up to one-half of your liver can be removed if the rest of it is healthy. The doctor made a cut, called an incision, in your belly to take out part of the liver. If the doctor removed the right side of your liver, your gallbladder was also removed. Your belly will be sore after liver resection. This usually lasts about 1 to 2 weeks. You may also have nausea, diarrhea, constipation, gas, or a headache. You may have a low fever and feel tired and sick to your stomach. The skin around the incision the doctor made may be numb because nerves were cut. This is common and may get better with time. But it's likely that you will always have some numbness where the incision was made. You may need 4 to 8 weeks to fully recover. This care sheet gives you a general idea about how long it will take for you to recover. But each person recovers at a different pace. Follow the steps below to get better as quickly as possible. How can you care for yourself at home? Activity    · Rest when you feel tired.  Getting enough sleep will help you recover.     · Try to walk each day. Start by walking a little more than you did the day before. Bit by bit, increase the amount you walk. Walking boosts blood flow and helps prevent pneumonia and constipation.     · Avoid strenuous activities, such as bicycle riding, jogging, weight lifting, or aerobic exercise, until your doctor says it is okay.     · For at least 8 weeks, avoid lifting anything that would make you strain. This may include a child, heavy grocery bags and milk containers, a heavy briefcase or backpack, cat litter or dog food bags, or a vacuum .     · Hold a pillow over your incision when you cough or take deep breaths. This will support your belly and decrease your pain.     · Do breathing exercises at home as instructed by your doctor. This will help prevent pneumonia.     · Ask your doctor when you can drive again.     · You will probably need to take 4 to 8 weeks off from work. It depends on the type of work you do and how you feel.     · You may be able to take showers (unless you have a drain near your incision). If you have a drain near your incision, follow your doctor's instructions to empty and care for it. Do not take a bath for the first 2 weeks, or until your doctor tells you it is okay.     · Ask your doctor when it is okay for you to have sex. Diet    · You can eat your normal diet. If your stomach is upset, try bland, low-fat foods like plain rice, broiled chicken, toast, and yogurt.     · Drink plenty of fluids (unless your doctor tells you not to).   · Check with your doctor before drinking alcohol. Alcohol can damage the liver.     · You may notice that your bowel movements are not regular right after your surgery. This is common. Try to avoid constipation and straining with bowel movements. You may want to take a fiber supplement every day. If you have not had a bowel movement after a couple of days, ask your doctor about taking a mild laxative.    Medicines    · Your doctor will tell you if and when you can restart your medicines. He or she will also give you instructions about taking any new medicines.     · If you take aspirin or some other blood thinner, ask your doctor if and when to start taking it again. Make sure that you understand exactly what your doctor wants you to do.     · Take pain medicines exactly as directed. ? If the doctor gave you a prescription medicine for pain, take it as prescribed. ? If you are not taking a prescription pain medicine, take an over-the-counter medicine that your doctor recommends. Read and follow all instructions on the label. ? Do not take aspirin, ibuprofen (Advil, Motrin), naproxen (Aleve), or other nonsteroidal anti-inflammatory drugs (NSAIDs) unless your doctor says it is okay.     · If you think your pain medicine is making you sick to your stomach:  ? Take your medicine after meals (unless your doctor has told you not to). ? Ask your doctor for a different kind of pain medicine.     · If your doctor prescribed antibiotics, take them as directed. Do not stop taking them just because you feel better. You need to take the full course of antibiotics. Incision care    · If you have strips of tape on the incision, leave the tape on for a week or until it falls off.     · Wash the area daily with warm, soapy water, and pat it dry. Don't use hydrogen peroxide or alcohol, which can slow healing. You may cover the area with a gauze bandage if it weeps or rubs against clothing. Change the bandage every day.     · Keep the area clean and dry. Follow-up care is a key part of your treatment and safety. Be sure to make and go to all appointments, and call your doctor if you are having problems. It's also a good idea to know your test results and keep a list of the medicines you take. When should you call for help? Call 911 anytime you think you may need emergency care. For example, call if:    · You passed out (lost consciousness).   · You are short of breath. Call your doctor now or seek immediate medical care if:    · You have pain that does not get better after you take pain medicine.     · You have loose stitches, or your incision comes open.     · You have signs of infection, such as:  ? Increased pain, swelling, warmth, or redness. ? Red streaks leading from the incision. ? Pus draining from the incision. ? A fever.     · You are sick to your stomach and cannot drink fluids or keep them down.     · You have signs of a blood clot in your leg (called a deep vein thrombosis), such as:  ? Pain in your calf, back of the knee, thigh, or groin. ? Redness and swelling in your leg or groin.     · Bright red blood has soaked through the bandage.     · You cannot pass stools or gas. Watch closely for changes in your health, and be sure to contact your doctor if you have any problems. Where can you learn more? Go to http://bernice-radha.info/  Enter U272 in the search box to learn more about \"Liver Resection: What to Expect at Home. \"  Current as of: April 15, 2020               Content Version: 12.8  © 3435-9555 [employer]. Care instructions adapted under license by [employer] (which disclaims liability or warranty for this information). If you have questions about a medical condition or this instruction, always ask your healthcare professional. [address] any warranty or liability for your use of this information. DISCHARGE SUMMARY from Nurse    PATIENT INSTRUCTIONS:    After general anesthesia or intravenous sedation, for 24 hours or while taking prescription Narcotics:  · Limit your activities  · Do not drive and operate hazardous machinery  · Do not make important personal or business decisions  · Do  not drink alcoholic beverages  · If you have not urinated within 8 hours after discharge, please contact your surgeon on call.     Report the following to your surgeon:  · Excessive pain, swelling, redness or odor of or around the surgical area  · Temperature over 100.5  · Nausea and vomiting lasting longer than 4 hours or if unable to take medications  · Any signs of decreased circulation or nerve impairment to extremity: change in color, persistent  numbness, tingling, coldness or increase pain  · Any questions    What to do at Home:    Recommended activity: No heavy lifting greater than 20 pounds. May shower but no tub baths, hot tubs, or swimming. No driving until off pain medications for 24 hours and approved by MD.  Remove dressings in AM unless draining. After showering, never leave a wet dressing in place. If you experience any of the following symptoms, please call Dr Esequiel Parker at [phone]: Temperature of 100.5 or greater, persistent nausea and vomiting, increased pain, any signs of abnormal bleeding, or increased redness, swelling, or drainage from surgical site    *  Please give a list of your current medications to your Primary Care Provider. *  Please update this list whenever your medications are discontinued, doses are      changed, or new medications (including over-the-counter products) are added. *  Please carry medication information at all times in case of emergency situations. These are general instructions for a healthy lifestyle:    No smoking/ No tobacco products/ Avoid exposure to second hand smoke  Surgeon General's Warning:  Quitting smoking now greatly reduces serious risk to your health.     Obesity, smoking, and sedentary lifestyle greatly increases your risk for illness    A healthy diet, regular physical exercise & weight monitoring are important for maintaining a healthy lifestyle    You may be retaining fluid if you have a history of heart failure or if you experience any of the following symptoms:  Weight gain of 3 pounds or more overnight or 5 pounds in a week, increased swelling in our hands or feet or shortness of breath while lying flat in bed. Please call your doctor as soon as you notice any of these symptoms; do not wait until your next office visit. The discharge information has been reviewed with the patient. The patient verbalized understanding. Discharge medications reviewed with the patient and appropriate educational materials and side effects teaching were provided.   ___________________________________________________________________________________________________________________________________

## 2021-05-15 LAB
ABO + RH BLD: NORMAL
BLD PROD TYP BPU: NORMAL
BLD PROD TYP BPU: NORMAL
BLOOD GROUP ANTIBODIES SERPL: NORMAL
BPU ID: NORMAL
BPU ID: NORMAL
CROSSMATCH RESULT,%XM: NORMAL
CROSSMATCH RESULT,%XM: NORMAL
SPECIMEN EXP DATE BLD: NORMAL
STATUS OF UNIT,%ST: NORMAL
STATUS OF UNIT,%ST: NORMAL
UNIT DIVISION, %UDIV: 0
UNIT DIVISION, %UDIV: 0

## 2021-05-17 NOTE — PROGRESS NOTES
Physician Progress Note      PATIENT:               Timothy Murcia  CSN #:                  [id number]  :                       1933  ADMIT DATE:       2021 5:23 AM  DISCH DATE:        2021 4:25 PM  RESPONDING  PROVIDER #:        Lattie Siemens NP          QUERY TEXT:    Patient admitted for scheduled surgery, noted to have history of CKD. If possible, please document in progress notes and discharge summary if you are evaluating and/or treating any of the following: The medical record reflects the following:  Risk Factors: hx CKD  Clinical Indicators: GFR ranges 34 to 36  Treatment: IVF    Thank You. Tremaine Tyson RN CDI  [phone]  Options provided:  -- CKD Stage 3b GFR 30-44  -- CKD Stage 4 GFR 15-29  -- Other - I will add my own diagnosis  -- Disagree - Not applicable / Not valid  -- Disagree - Clinically unable to determine / Unknown  -- Refer to Clinical Documentation Reviewer    PROVIDER RESPONSE TEXT:    This patient has CKD Stage 3b.     Query created by: Carlos Palacios on 2021 8:39 AM      Electronically signed by:  Lattie Siemens NP 2021 11:41 AM

## 2021-06-04 ENCOUNTER — HOSPITAL ENCOUNTER (OUTPATIENT)
Dept: LAB | Age: 86
Discharge: HOME | End: 2021-06-04

## 2021-06-04 PROCEDURE — 88305 TISSUE EXAM BY PATHOLOGIST: CPT

## 2021-07-28 ENCOUNTER — HOSPITAL ENCOUNTER (OUTPATIENT)
Dept: LAB | Age: 86
Discharge: HOME | End: 2021-07-28
Payer: MEDICARE

## 2021-07-28 DIAGNOSIS — C22.0 HEPATOCELLULAR CARCINOMA (HCC): ICD-10-CM

## 2021-07-28 LAB
AFP-TM SERPL-MCNC: 5.2 NG/ML
ALBUMIN SERPL-MCNC: 3.6 G/DL (ref 3.2–4.6)
ALBUMIN/GLOB SERPL: 1 {RATIO} (ref 1.2–3.5)
ALP SERPL-CCNC: 63 U/L (ref 50–136)
ALT SERPL-CCNC: 18 U/L (ref 12–65)
ANION GAP SERPL CALC-SCNC: 6 MMOL/L (ref 7–16)
AST SERPL-CCNC: 14 U/L (ref 15–37)
BASOPHILS # BLD: 0.1 K/UL (ref 0–0.2)
BASOPHILS NFR BLD: 1 % (ref 0–2)
BILIRUB SERPL-MCNC: 0.3 MG/DL (ref 0.2–1.1)
BUN SERPL-MCNC: 26 MG/DL (ref 8–23)
CALCIUM SERPL-MCNC: 9 MG/DL (ref 8.3–10.4)
CHLORIDE SERPL-SCNC: 107 MMOL/L (ref 98–107)
CO2 SERPL-SCNC: 26 MMOL/L (ref 21–32)
CREAT SERPL-MCNC: 1.8 MG/DL (ref 0.8–1.5)
DIFFERENTIAL METHOD BLD: ABNORMAL
EOSINOPHIL # BLD: 0.4 K/UL (ref 0–0.8)
EOSINOPHIL NFR BLD: 6 % (ref 0.5–7.8)
ERYTHROCYTE [DISTWIDTH] IN BLOOD BY AUTOMATED COUNT: 14.4 % (ref 11.9–14.6)
GLOBULIN SER CALC-MCNC: 3.6 G/DL (ref 2.3–3.5)
GLUCOSE SERPL-MCNC: 99 MG/DL (ref 65–100)
HCT VFR BLD AUTO: 41.3 %
HGB BLD-MCNC: 12.8 G/DL (ref 13.6–17.2)
IMM GRANULOCYTES # BLD AUTO: 0 K/UL (ref 0–0.5)
IMM GRANULOCYTES NFR BLD AUTO: 0 % (ref 0–5)
LYMPHOCYTES # BLD: 1.5 K/UL (ref 0.5–4.6)
LYMPHOCYTES NFR BLD: 23 % (ref 13–44)
MCH RBC QN AUTO: 25.9 PG (ref 26.1–32.9)
MCHC RBC AUTO-ENTMCNC: 31 G/DL (ref 31.4–35)
MCV RBC AUTO: 83.6 FL (ref 79.6–97.8)
MONOCYTES # BLD: 0.6 K/UL (ref 0.1–1.3)
MONOCYTES NFR BLD: 10 % (ref 4–12)
NEUTS SEG # BLD: 3.9 K/UL (ref 1.7–8.2)
NEUTS SEG NFR BLD: 60 % (ref 43–78)
NRBC # BLD: 0 K/UL (ref 0–0.2)
PLATELET # BLD AUTO: 248 K/UL (ref 150–450)
PMV BLD AUTO: 9.7 FL (ref 9.4–12.3)
POTASSIUM SERPL-SCNC: 4.4 MMOL/L (ref 3.5–5.1)
PROT SERPL-MCNC: 7.2 G/DL (ref 6.3–8.2)
RBC # BLD AUTO: 4.94 M/UL (ref 4.23–5.6)
SODIUM SERPL-SCNC: 139 MMOL/L (ref 136–145)
WBC # BLD AUTO: 6.4 K/UL (ref 4.3–11.1)

## 2021-07-28 PROCEDURE — 82105 ALPHA-FETOPROTEIN SERUM: CPT

## 2021-07-28 PROCEDURE — 85025 COMPLETE CBC W/AUTO DIFF WBC: CPT

## 2021-07-28 PROCEDURE — 36415 COLL VENOUS BLD VENIPUNCTURE: CPT

## 2021-07-28 PROCEDURE — 80053 COMPREHEN METABOLIC PANEL: CPT

## 2021-08-03 PROBLEM — I25.10 CORONARY ARTERY DISEASE INVOLVING NATIVE HEART: Status: RESOLVED | Noted: 2019-02-07 | Resolved: 2021-08-03

## 2021-08-03 PROBLEM — I25.10 ATHEROSCLEROSIS OF NATIVE CORONARY ARTERY OF NATIVE HEART: Status: RESOLVED | Noted: 2019-03-05 | Resolved: 2021-08-03

## 2021-11-24 ENCOUNTER — HOSPITAL ENCOUNTER (OUTPATIENT)
Dept: ULTRASOUND IMAGING | Age: 86
Discharge: HOME | End: 2021-11-24
Attending: INTERNAL MEDICINE
Payer: MEDICARE

## 2021-11-24 DIAGNOSIS — Z48.89 AFTERCARE FOLLOWING SURGERY: ICD-10-CM

## 2021-11-24 DIAGNOSIS — C22.0 HEPATOCELLULAR CARCINOMA (HCC): ICD-10-CM

## 2021-11-24 PROCEDURE — 76700 US EXAM ABDOM COMPLETE: CPT

## 2021-11-30 ENCOUNTER — HOSPITAL ENCOUNTER (OUTPATIENT)
Dept: LAB | Age: 86
Discharge: HOME | End: 2021-11-30
Payer: MEDICARE

## 2021-11-30 DIAGNOSIS — C22.0 HEPATOCELLULAR CARCINOMA (HCC): ICD-10-CM

## 2021-11-30 LAB
AFP-TM SERPL-MCNC: 10.2 NG/ML
ALBUMIN SERPL-MCNC: 3.3 G/DL (ref 3.2–4.6)
ALBUMIN/GLOB SERPL: 0.8 {RATIO} (ref 1.2–3.5)
ALP SERPL-CCNC: 72 U/L (ref 50–136)
ALT SERPL-CCNC: 19 U/L (ref 12–65)
ANION GAP SERPL CALC-SCNC: 9 MMOL/L (ref 7–16)
AST SERPL-CCNC: 12 U/L (ref 15–37)
BASOPHILS # BLD: 0.1 K/UL (ref 0–0.2)
BASOPHILS NFR BLD: 1 % (ref 0–2)
BILIRUB SERPL-MCNC: 0.3 MG/DL (ref 0.2–1.1)
BUN SERPL-MCNC: 28 MG/DL (ref 8–23)
CALCIUM SERPL-MCNC: 8.6 MG/DL (ref 8.3–10.4)
CHLORIDE SERPL-SCNC: 109 MMOL/L (ref 98–107)
CO2 SERPL-SCNC: 22 MMOL/L (ref 21–32)
CREAT SERPL-MCNC: 2.2 MG/DL (ref 0.8–1.5)
DIFFERENTIAL METHOD BLD: ABNORMAL
EOSINOPHIL # BLD: 0.6 K/UL (ref 0–0.8)
EOSINOPHIL NFR BLD: 9 % (ref 0.5–7.8)
ERYTHROCYTE [DISTWIDTH] IN BLOOD BY AUTOMATED COUNT: 14.9 % (ref 11.9–14.6)
GLOBULIN SER CALC-MCNC: 3.9 G/DL (ref 2.3–3.5)
GLUCOSE SERPL-MCNC: 171 MG/DL (ref 65–100)
HCT VFR BLD AUTO: 42.9 %
HGB BLD-MCNC: 12.9 G/DL (ref 13.6–17.2)
IMM GRANULOCYTES # BLD AUTO: 0 K/UL (ref 0–0.5)
IMM GRANULOCYTES NFR BLD AUTO: 1 % (ref 0–5)
LYMPHOCYTES # BLD: 1.5 K/UL (ref 0.5–4.6)
LYMPHOCYTES NFR BLD: 23 % (ref 13–44)
MCH RBC QN AUTO: 25.1 PG (ref 26.1–32.9)
MCHC RBC AUTO-ENTMCNC: 30.1 G/DL (ref 31.4–35)
MCV RBC AUTO: 83.6 FL (ref 79.6–97.8)
MONOCYTES # BLD: 0.6 K/UL (ref 0.1–1.3)
MONOCYTES NFR BLD: 10 % (ref 4–12)
NEUTS SEG # BLD: 3.7 K/UL (ref 1.7–8.2)
NEUTS SEG NFR BLD: 56 % (ref 43–78)
NRBC # BLD: 0 K/UL (ref 0–0.2)
PLATELET # BLD AUTO: 239 K/UL (ref 150–450)
PMV BLD AUTO: 10.1 FL (ref 9.4–12.3)
POTASSIUM SERPL-SCNC: 4.3 MMOL/L (ref 3.5–5.1)
PROT SERPL-MCNC: 7.2 G/DL (ref 6.3–8.2)
RBC # BLD AUTO: 5.13 M/UL (ref 4.23–5.6)
SODIUM SERPL-SCNC: 140 MMOL/L (ref 136–145)
WBC # BLD AUTO: 6.6 K/UL (ref 4.3–11.1)

## 2021-11-30 PROCEDURE — 80053 COMPREHEN METABOLIC PANEL: CPT

## 2021-11-30 PROCEDURE — 85025 COMPLETE CBC W/AUTO DIFF WBC: CPT

## 2021-11-30 PROCEDURE — 82105 ALPHA-FETOPROTEIN SERUM: CPT

## 2021-11-30 PROCEDURE — 36415 COLL VENOUS BLD VENIPUNCTURE: CPT

## 2022-02-18 ENCOUNTER — HOSPITAL ENCOUNTER (EMERGENCY)
Age: 87
Discharge: HOME | End: 2022-02-18
Attending: EMERGENCY MEDICINE
Payer: MEDICARE

## 2022-02-18 ENCOUNTER — APPOINTMENT (OUTPATIENT)
Dept: CT IMAGING | Age: 87
End: 2022-02-18
Attending: EMERGENCY MEDICINE
Payer: MEDICARE

## 2022-02-18 ENCOUNTER — APPOINTMENT (OUTPATIENT)
Dept: GENERAL RADIOLOGY | Age: 87
End: 2022-02-18
Attending: EMERGENCY MEDICINE
Payer: MEDICARE

## 2022-02-18 VITALS
HEIGHT: 69 IN | WEIGHT: 195 LBS | TEMPERATURE: 97.5 F | SYSTOLIC BLOOD PRESSURE: 152 MMHG | OXYGEN SATURATION: 94 % | HEART RATE: 75 BPM | DIASTOLIC BLOOD PRESSURE: 76 MMHG | BODY MASS INDEX: 28.88 KG/M2 | RESPIRATION RATE: 30 BRPM

## 2022-02-18 DIAGNOSIS — R42 DIZZINESS: Primary | ICD-10-CM

## 2022-02-18 LAB
ALBUMIN SERPL-MCNC: 3.4 G/DL (ref 3.2–4.6)
ALBUMIN/GLOB SERPL: 0.9 {RATIO} (ref 1.2–3.5)
ALP SERPL-CCNC: 72 U/L (ref 50–136)
ALT SERPL-CCNC: 24 U/L (ref 12–65)
ANION GAP SERPL CALC-SCNC: 7 MMOL/L (ref 7–16)
APTT PPP: 35.6 SEC (ref 24.1–35.1)
AST SERPL-CCNC: 21 U/L (ref 15–37)
BASOPHILS # BLD: 0.1 K/UL (ref 0–0.2)
BASOPHILS NFR BLD: 1 % (ref 0–2)
BILIRUB SERPL-MCNC: 0.4 MG/DL (ref 0.2–1.1)
BUN SERPL-MCNC: 31 MG/DL (ref 8–23)
CALCIUM SERPL-MCNC: 8.8 MG/DL (ref 8.3–10.4)
CHLORIDE SERPL-SCNC: 109 MMOL/L (ref 98–107)
CO2 SERPL-SCNC: 24 MMOL/L (ref 21–32)
CREAT SERPL-MCNC: 1.9 MG/DL (ref 0.8–1.5)
DIFFERENTIAL METHOD BLD: ABNORMAL
EOSINOPHIL # BLD: 0.3 K/UL (ref 0–0.8)
EOSINOPHIL NFR BLD: 4 % (ref 0.5–7.8)
ERYTHROCYTE [DISTWIDTH] IN BLOOD BY AUTOMATED COUNT: 15.2 % (ref 11.9–14.6)
GLOBULIN SER CALC-MCNC: 3.8 G/DL (ref 2.3–3.5)
GLUCOSE SERPL-MCNC: 97 MG/DL (ref 65–100)
HCT VFR BLD AUTO: 43 % (ref 41.1–50.3)
HGB BLD-MCNC: 13.3 G/DL (ref 13.6–17.2)
IMM GRANULOCYTES # BLD AUTO: 0 K/UL (ref 0–0.5)
IMM GRANULOCYTES NFR BLD AUTO: 0 % (ref 0–5)
INR PPP: 0.9
LYMPHOCYTES # BLD: 1.6 K/UL (ref 0.5–4.6)
LYMPHOCYTES NFR BLD: 24 % (ref 13–44)
MAGNESIUM SERPL-MCNC: 2.2 MG/DL (ref 1.8–2.4)
MCH RBC QN AUTO: 25.8 PG (ref 26.1–32.9)
MCHC RBC AUTO-ENTMCNC: 30.9 G/DL (ref 31.4–35)
MCV RBC AUTO: 83.5 FL (ref 79.6–97.8)
MONOCYTES # BLD: 0.6 K/UL (ref 0.1–1.3)
MONOCYTES NFR BLD: 10 % (ref 4–12)
NEUTS SEG # BLD: 4 K/UL (ref 1.7–8.2)
NEUTS SEG NFR BLD: 61 % (ref 43–78)
NRBC # BLD: 0 K/UL (ref 0–0.2)
PLATELET # BLD AUTO: 204 K/UL (ref 150–450)
PMV BLD AUTO: 10 FL (ref 9.4–12.3)
POTASSIUM SERPL-SCNC: 4 MMOL/L (ref 3.5–5.1)
PROT SERPL-MCNC: 7.2 G/DL (ref 6.3–8.2)
PROTHROMBIN TIME: 12.7 SEC (ref 12.6–14.5)
RBC # BLD AUTO: 5.15 M/UL (ref 4.23–5.6)
SODIUM SERPL-SCNC: 140 MMOL/L (ref 136–145)
TROPONIN-HIGH SENSITIVITY: 12 PG/ML (ref 0–14)
TROPONIN-HIGH SENSITIVITY: 12.9 PG/ML (ref 0–14)
WBC # BLD AUTO: 6.5 K/UL (ref 4.3–11.1)

## 2022-02-18 PROCEDURE — 80053 COMPREHEN METABOLIC PANEL: CPT

## 2022-02-18 PROCEDURE — 85610 PROTHROMBIN TIME: CPT

## 2022-02-18 PROCEDURE — 71045 X-RAY EXAM CHEST 1 VIEW: CPT

## 2022-02-18 PROCEDURE — 85730 THROMBOPLASTIN TIME PARTIAL: CPT

## 2022-02-18 PROCEDURE — 70450 CT HEAD/BRAIN W/O DYE: CPT

## 2022-02-18 PROCEDURE — 99285 EMERGENCY DEPT VISIT HI MDM: CPT

## 2022-02-18 PROCEDURE — 85025 COMPLETE CBC W/AUTO DIFF WBC: CPT

## 2022-02-18 PROCEDURE — 93005 ELECTROCARDIOGRAM TRACING: CPT | Performed by: EMERGENCY MEDICINE

## 2022-02-18 PROCEDURE — 84484 ASSAY OF TROPONIN QUANT: CPT

## 2022-02-18 PROCEDURE — 83735 ASSAY OF MAGNESIUM: CPT

## 2022-02-18 PROCEDURE — 74011250636 HC RX REV CODE- 250/636: Performed by: EMERGENCY MEDICINE

## 2022-02-18 RX ORDER — MECLIZINE HYDROCHLORIDE 25 MG/1
25 TABLET ORAL
Status: COMPLETED | OUTPATIENT
Start: 2022-02-18 | End: 2022-02-18

## 2022-02-18 RX ORDER — MECLIZINE HYDROCHLORIDE 25 MG/1
25 TABLET ORAL
Qty: 25 TABLET | Refills: 0 | Status: SHIPPED | OUTPATIENT
Start: 2022-02-18 | End: 2022-02-28

## 2022-02-18 RX ADMIN — MECLIZINE HYDROCHLORIDE 25 MG: 25 TABLET ORAL at 19:29

## 2022-02-18 NOTE — ED TRIAGE NOTES
Pt arrives with dizziness for past few days, worse when he stands. States he does not take BP medication but found BP was high at home. Denies numbness or tingling. Mask applied to pt.

## 2022-02-18 NOTE — ED PROVIDER NOTES
80-year-old male with history of CAD, BPH, CKD, anemia, BPPV, hepatocellular carcinoma, GERD presents with complaint of intermittent dizziness over the past several days. Reports symptoms are exacerbated with standing. States that he went to [facility] earlier today and that symptoms were exacerbated when he came back home. Denies focal weakness, numbness, tingling. Denies headache, vision disturbance, chest pain, shortness of breath, abdominal pain, urinary symptoms. Denies facial droop, slurred speech. States that he has noted that his blood pressure has been elevated at home around 180s over 90s. Denies taking any blood pressure medication. Denies history of stroke. Patient recently seen by ENT in office 2 days ago. Patient states he was instructed to attempt the Epley maneuver maneuver at home which he has not. Also states he is to follow-up physical therapy    The history is provided by the patient. No  was used. Dizziness  This is a recurrent problem. The current episode started more than 2 days ago. The problem has not changed since onset. There was no focality noted. Pertinent negatives include no focal weakness, no loss of sensation, no slurred speech, no speech difficulty, no memory loss, no movement disorder, no agitation, no visual change, no auditory change, no mental status change, no unresponsiveness and no disorientation. Pertinent negatives include no shortness of breath, no chest pain, no vomiting, no confusion, no headaches and no nausea. Hypertension   Associated symptoms include dizziness. Pertinent negatives include no chest pain, no palpitations, no confusion, no headaches, no neck pain, no shortness of breath, no nausea and no vomiting. Past Medical History:   Diagnosis Date    Abnormal CT of liver 03/02/2021    New heterogeneous liver mass measuring up to 6.4 cm.  Further evaluation required    Anemia     GI esophageal ulcer per EGD- was given 2 units prbc's 3/12/21, eliquis has been stopped    AR (allergic rhinitis) 8/3/2016    Arthritis     Benign paroxysmal positional vertigo     Bilateral impacted cerumen     BPH (benign prostatic hyperplasia)     CAD (coronary artery disease)     2003 stent placed; MI in 2003     Chronic kidney disease     Stage III kidney disease elevated creatinine BUN    Chronic otitis media     Chronic pain     herniated disc in lower back; ESIs in recent past    Chronic pancreatitis ([facility] [address]     Claustrophobia     Diverticulitis 8/3/2016    DNS (deviated nasal septum)     ETD (eustachian tube dysfunction)     GERD (gastroesophageal reflux disease)     controlled w/ prescription meds and OTC meds (pepcid)    H/O heart artery stent     S/P RCA stent by Dr. Kari Esparza in 2003    Heart disease 8/3/2016    Hepatocellular carcinoma ([facility] [address]     High frequency hearing loss     History of bilateral inguinal hernia repair     History of gastroesophageal reflux (GERD)     controlled w/ med; no breakthrough GERD    Hx of exercise stress test 02/22/2019    EF on Stress test 71%    Hypercholesterolemia     takes statin and fish oil    Hyperlipidemia     takes statin and fish oil    Hypertrophy of both inferior nasal turbinates     Kidney stone     Liver disease     going for MRI; recent CT showed mass on liver    Liver mass 4/16/2021    MHL (mixed hearing loss)     NO (nasal obstruction)     Otitis media, nonsuppurative     SNHL (sensorineural hearing loss) 8/3/2016    Squamous cell carcinoma, arm     left forearm near wrist; several other sites as well    Type 2 diabetes mellitus (HCC)     insulin reliant/AVG FBS: 100-120/s.s of hypoglycemia at 70/last A1c:6.6    Unspecified sleep apnea     no cpap; patient states he lost weight and does not have TARIQ anymore    Vertigo        Past Surgical History:   Procedure Laterality Date    HX APPENDECTOMY      HX CATARACT REMOVAL Bilateral     HX COLONOSCOPY      HX CORONARY STENT PLACEMENT  2003    stent RCA 2003    HX ENDOSCOPY  03/11/2021    HX ERCP  12/2015    ENDOSCOPIC RETROGRADE CHOLANGIOPANCREATOGRAPHY (N/A [place] GI [place]) ENDOSCOPIC SPHINCTEROTOMY (N/A [place] [facility] [place]) ENDOSCOPIC STONE EXTRACTION/BALLOON SWEEP (N/A [facility] [place])    HX HERNIA REPAIR Bilateral 1980s    HX HERNIA REPAIR Left 2014    LIH with mesh    HX MASTOIDECTOMY  1996    right modified canal wall down    HX ORTHOPAEDIC Right 1/14/03    Dupuytrens contracture release    HX OTHER SURGICAL      skin lesion; local excision: SCC    HX TURP      HX TURP      HX TYMPANOSTOMY Right     tube right ear    HX VITRECTOMY      DE CYSTOSCOPY,INSERT URETERAL STENT Right 03/05/2021         Family History:   Problem Relation Age of Onset    Cancer Maternal Grandmother         colon    Heart Disease Maternal Grandfather        Social History     Socioeconomic History    Marital status:      Spouse name: Not on file    Number of children: Not on file    Years of education: Not on file    Highest education level: Not on file   Occupational History    Not on file   Tobacco Use    Smoking status: Never Smoker    Smokeless tobacco: Never Used   Vaping Use    Vaping Use: Never used   Substance and Sexual Activity    Alcohol use: No    Drug use: No    Sexual activity: Not on file   Other Topics Concern    Not on file   Social History Narrative    Not on file     Social Determinants of Health     Financial Resource Strain:     Difficulty of Paying Living Expenses: Not on file   Food Insecurity:     Worried About Running Out of Food in the Last Year: Not on file    Garrett of Food in the Last Year: Not on file   Transportation Needs:     Lack of Transportation (Medical): Not on file    Lack of Transportation (Non-Medical):  Not on file   Physical Activity:     Days of Exercise per Week: Not on file    Minutes of Exercise per Session: Not on file   Stress:     Feeling of Stress : Not on file Social Connections:     Frequency of Communication with Friends and Family: Not on file    Frequency of Social Gatherings with Friends and Family: Not on file    Attends Anglican Services: Not on file    Active Member of Clubs or Organizations: Not on file    Attends Club or Organization Meetings: Not on file    Marital Status: Not on file   Intimate Partner Violence:     Fear of Current or Ex-Partner: Not on file    Emotionally Abused: Not on file    Physically Abused: Not on file    Sexually Abused: Not on file   Housing Stability:     Unable to Pay for Housing in the Last Year: Not on file    Number of [place] in the Last Year: Not on file    Unstable Housing in the Last Year: Not on file         ALLERGIES: Iohexol, Other medication, Atorvastatin, Cipro [ciprofloxacin], Codeine, Iodinated contrast media, Penicillins, and Valdecoxib    Review of Systems   Constitutional: Negative for chills, diaphoresis, fatigue and fever. HENT: Negative for congestion and rhinorrhea. Eyes: Negative for photophobia and visual disturbance. Respiratory: Negative for cough and shortness of breath. Cardiovascular: Negative for chest pain and palpitations. Gastrointestinal: Negative for abdominal pain, diarrhea, nausea and vomiting. Genitourinary: Negative for dysuria and flank pain. Musculoskeletal: Negative for arthralgias, back pain, neck pain and neck stiffness. Skin: Negative for color change, pallor and rash. Neurological: Positive for dizziness. Negative for focal weakness, seizures, syncope, facial asymmetry, speech difficulty, weakness, light-headedness, numbness and headaches. Hematological: Does not bruise/bleed easily. Psychiatric/Behavioral: Negative for agitation, confusion and memory loss.        Vitals:    02/18/22 1657   BP: (!) 200/90   Pulse: 62   Resp: 18   Temp: 97.5 °F (36.4 °C)   SpO2: 100%   Weight: 88.5 kg (195 lb)   Height: 5' 9\" (1.753 m)            Physical Exam  Vitals and nursing note reviewed. Constitutional:       Appearance: Normal appearance. HENT:      Head: Normocephalic. Nose: Nose normal.      Mouth/Throat:      Mouth: Mucous membranes are moist.   Eyes:      Extraocular Movements: Extraocular movements intact. Conjunctiva/sclera: Conjunctivae normal.      Pupils: Pupils are equal, round, and reactive to light. Comments: Chronic appearing changes noted to right eye. Neck:      Comments: FROM. Cardiovascular:      Rate and Rhythm: Normal rate. Pulses: Normal pulses. Heart sounds: Normal heart sounds. Comments: Pulses 2+ and equal throughout. Pulmonary:      Effort: Pulmonary effort is normal.      Breath sounds: Normal breath sounds. Abdominal:      General: Bowel sounds are normal.      Palpations: Abdomen is soft. Tenderness: There is no abdominal tenderness. There is no guarding or rebound. Comments: Soft, NTND. Musculoskeletal:         General: No swelling or tenderness. Normal range of motion. Cervical back: Normal range of motion. No rigidity. Skin:     General: Skin is warm. Capillary Refill: Capillary refill takes less than 2 seconds. Findings: No erythema or rash. Neurological:      General: No focal deficit present. Mental Status: He is alert and oriented to person, place, and time. Cranial Nerves: No cranial nerve deficit. Sensory: No sensory deficit. Motor: No weakness. Comments: Strength 5 out of 5 throughout. No facial droop. Normal sensation. No drift. No focal deficits. No dysarthria or aphasia noted. MDM  Number of Diagnoses or Management Options  Dizziness: new and requires workup  Diagnosis management comments: Repeat blood pressure unremarkable. CT head with no acute intracranial abnormality. Chest x-ray clear. Labs at baseline. No acute concerning findings.   Discussed with patient that his symptoms could potentially be secondary to a stroke and recommended further imaging including CTA and MRI brain. Patient declines at this time. Antivert 25 mg given. Patient denies further work-up and states he is like to be discharged home. Patient ambulatory without assistance. No focal deficits. Patient given discharge instructions with instructions on how to perform Epley maneuver. Patient instructed to follow-up with PCP, ENT, neuro. Amount and/or Complexity of Data Reviewed  Clinical lab tests: ordered and reviewed  Tests in the radiology section of CPT®: ordered and reviewed  Tests in the medicine section of CPT®: ordered and reviewed  Review and summarize past medical records: yes  Independent visualization of images, tracings, or specimens: yes    Risk of Complications, Morbidity, and/or Mortality  Presenting problems: high  Diagnostic procedures: moderate  Management options: moderate    Patient Progress  Patient progress: stable    ED Course as of 02/18/22 1943 Fri Feb 18, 2022   1723 CXR Findings: The cardiac silhouette is normal in respect to size. The lungs are expanded without evidence for pneumothorax. No consolidative airspace process or pleural effusion is seen. A small calcified appearing nodule is seen in the left upper lung field which is unchanged over multiple prior studies.     IMPRESSION  1. No acute cardiopulmonary process evident on single frontal view of the chest. [DF]   1753 CT head IMPRESSION:   1. No acute intracranial process evident by noncontrast CT study of the head. [DF]   1938 Troponin-High Sensitivity: 12.9 [DF]      ED Course User Index  [DF] Vicki Carey MD       EKG    Date/Time: 2/18/2022 5:05 PM  Performed by: Vicki Carey MD  Authorized by:  Vicki Carey MD     ECG reviewed by ED Physician in the absence of a cardiologist: yes    Rate:     ECG rate:  61    ECG rate assessment: normal    Rhythm:     Rhythm: sinus rhythm    Ectopy: Ectopy: none    QRS:     QRS axis:  Normal    QRS intervals:  Normal  Conduction:     Conduction: normal    ST segments:     ST segments:  Normal  T waves:     T waves: normal            Results Include:    Recent Results (from the past 24 hour(s))   EKG, 12 LEAD, INITIAL    Collection Time: 02/18/22  5:03 PM   Result Value Ref Range    Ventricular Rate 61 BPM    Atrial Rate 61 BPM    P-R Interval 236 ms    QRS Duration 96 ms    Q-T Interval 414 ms    QTC Calculation (Bezet) 416 ms    Calculated P Axis 24 degrees    Calculated R Axis 74 degrees    Calculated T Axis 19 degrees    Diagnosis       Sinus rhythm with 1st degree A-V block  Otherwise normal ECG  When compared with ECG of 05-MAY-2021 14:45,  No significant change was found     CBC WITH AUTOMATED DIFF    Collection Time: 02/18/22  5:07 PM   Result Value Ref Range    WBC 6.5 4.3 - 11.1 K/uL    RBC 5.15 4.23 - 5.6 M/uL    HGB 13.3 (L) 13.6 - 17.2 g/dL    HCT 43.0 41.1 - 50.3 %    MCV 83.5 79.6 - 97.8 FL    MCH 25.8 (L) 26.1 - 32.9 PG    MCHC 30.9 (L) 31.4 - 35.0 g/dL    RDW 15.2 (H) 11.9 - 14.6 %    PLATELET 694 350 - 126 K/uL    MPV 10.0 9.4 - 12.3 FL    ABSOLUTE NRBC 0.00 0.0 - 0.2 K/uL    DF AUTOMATED      NEUTROPHILS 61 43 - 78 %    LYMPHOCYTES 24 13 - 44 %    MONOCYTES 10 4.0 - 12.0 %    EOSINOPHILS 4 0.5 - 7.8 %    BASOPHILS 1 0.0 - 2.0 %    IMMATURE GRANULOCYTES 0 0.0 - 5.0 %    ABS. NEUTROPHILS 4.0 1.7 - 8.2 K/UL    ABS. LYMPHOCYTES 1.6 0.5 - 4.6 K/UL    ABS. MONOCYTES 0.6 0.1 - 1.3 K/UL    ABS. EOSINOPHILS 0.3 0.0 - 0.8 K/UL    ABS. BASOPHILS 0.1 0.0 - 0.2 K/UL    ABS. IMM.  GRANS. 0.0 0.0 - 0.5 K/UL   METABOLIC PANEL, COMPREHENSIVE    Collection Time: 02/18/22  5:07 PM   Result Value Ref Range    Sodium 140 136 - 145 mmol/L    Potassium 4.0 3.5 - 5.1 mmol/L    Chloride 109 (H) 98 - 107 mmol/L    CO2 24 21 - 32 mmol/L    Anion gap 7 7 - 16 mmol/L    Glucose 97 65 - 100 mg/dL    BUN 31 (H) 8 - 23 MG/DL    Creatinine 1.90 (H) 0.8 - 1.5 MG/DL    GFR est AA 43 (L) >60 ml/min/1.73m2    GFR est non-AA 36 (L) >60 ml/min/1.73m2    Calcium 8.8 8.3 - 10.4 MG/DL    Bilirubin, total 0.4 0.2 - 1.1 MG/DL    ALT (SGPT) 24 12 - 65 U/L    AST (SGOT) 21 15 - 37 U/L    Alk. phosphatase 72 50 - 136 U/L    Protein, total 7.2 6.3 - 8.2 g/dL    Albumin 3.4 3.2 - 4.6 g/dL    Globulin 3.8 (H) 2.3 - 3.5 g/dL    A-G Ratio 0.9 (L) 1.2 - 3.5     MAGNESIUM    Collection Time: 02/18/22  5:07 PM   Result Value Ref Range    Magnesium 2.2 1.8 - 2.4 mg/dL   PROTHROMBIN TIME + INR    Collection Time: 02/18/22  5:07 PM   Result Value Ref Range    Prothrombin time 12.7 12.6 - 14.5 sec    INR 0.9     PTT    Collection Time: 02/18/22  5:07 PM   Result Value Ref Range    aPTT 35.6 (H) 24.1 - 35.1 SEC   TROPONIN-HIGH SENSITIVITY    Collection Time: 02/18/22  5:07 PM   Result Value Ref Range    Troponin-High Sensitivity 12.0 0 - 14 pg/mL   TROPONIN-HIGH SENSITIVITY    Collection Time: 02/18/22  6:51 PM   Result Value Ref Range    Troponin-High Sensitivity 12.9 0 - 14 pg/mL                      Daryn Benito MD; 2/18/2022 @5:03 PM Voice dictation software was used during the making of this note. This software is not perfect and grammatical and other typographical errors may be present.   This note has not been proofread for errors.  ===================================================================

## 2022-02-19 LAB
ATRIAL RATE: 61 BPM
CALCULATED P AXIS, ECG09: 24 DEGREES
CALCULATED R AXIS, ECG10: 74 DEGREES
CALCULATED T AXIS, ECG11: 19 DEGREES
DIAGNOSIS, 93000: NORMAL
P-R INTERVAL, ECG05: 236 MS
Q-T INTERVAL, ECG07: 414 MS
QRS DURATION, ECG06: 96 MS
QTC CALCULATION (BEZET), ECG08: 416 MS
VENTRICULAR RATE, ECG03: 61 BPM

## 2022-02-19 NOTE — ED NOTES
I have reviewed discharge instructions with the patient. The patient verbalized understanding. Patient left ED via Discharge Method: wheelchair to Home with daughter. Opportunity for questions and clarification provided. Patient given 1 scripts. To continue your aftercare when you leave the hospital, you may receive an automated call from our care team to check in on how you are doing. This is a free service and part of our promise to provide the best care and service to meet your aftercare needs.  If you have questions, or wish to unsubscribe from this service please call [phone]. Thank you for Choosing our [facility] Emergency Department.

## 2022-02-19 NOTE — DISCHARGE INSTRUCTIONS
Schedule close follow-up with ENT, primary care physician, neurology.     Return to ED if symptoms worsen or progress in any way

## 2022-03-18 PROBLEM — Z98.61 HISTORY OF PTCA: Status: ACTIVE | Noted: 2019-02-07

## 2022-03-18 PROBLEM — I10 ESSENTIAL HYPERTENSION WITH GOAL BLOOD PRESSURE LESS THAN 130/85: Status: ACTIVE | Noted: 2020-11-18

## 2022-03-18 PROBLEM — E78.2 MIXED HYPERLIPIDEMIA: Status: ACTIVE | Noted: 2020-11-18

## 2022-03-18 PROBLEM — Z82.49 FAMILY HISTORY OF MI (MYOCARDIAL INFARCTION): Status: ACTIVE | Noted: 2020-11-18

## 2022-03-18 PROBLEM — C22.0 HEPATOCELLULAR CARCINOMA (HCC): Status: ACTIVE | Noted: 2021-05-06

## 2022-03-19 PROBLEM — K92.2 GI BLEED: Status: ACTIVE | Noted: 2021-03-10

## 2022-03-19 PROBLEM — E11.9 TYPE 2 DIABETES MELLITUS WITHOUT COMPLICATION (HCC): Status: ACTIVE | Noted: 2019-03-05

## 2022-03-19 PROBLEM — Z91.041: Status: ACTIVE | Noted: 2019-02-07

## 2022-03-19 PROBLEM — R07.9 CHEST PAIN AT REST: Status: ACTIVE | Noted: 2019-02-07

## 2022-03-19 PROBLEM — R16.0 LIVER MASS: Status: ACTIVE | Noted: 2021-04-16

## 2022-03-19 PROBLEM — I25.10 CORONARY ARTERY DISEASE INVOLVING NATIVE CORONARY ARTERY OF NATIVE HEART WITHOUT ANGINA PECTORIS: Status: ACTIVE | Noted: 2019-02-07

## 2022-03-19 PROBLEM — N20.1 RIGHT URETERAL STONE: Status: ACTIVE | Noted: 2021-03-05

## 2022-03-19 PROBLEM — N40.0 BPH (BENIGN PROSTATIC HYPERPLASIA): Status: ACTIVE | Noted: 2021-03-30

## 2022-03-20 PROBLEM — N18.30 STAGE 3 CHRONIC KIDNEY DISEASE (HCC): Status: ACTIVE | Noted: 2019-02-07

## 2022-04-27 DIAGNOSIS — C22.0 HEPATOCELLULAR CARCINOMA (HCC): Primary | ICD-10-CM

## 2022-05-27 ENCOUNTER — HOSPITAL ENCOUNTER (OUTPATIENT)
Dept: ULTRASOUND IMAGING | Age: 87
Discharge: HOME | End: 2022-05-30
Payer: MEDICARE

## 2022-05-27 DIAGNOSIS — C22.0 HEPATOCELLULAR CARCINOMA (HCC): ICD-10-CM

## 2022-05-27 PROCEDURE — 76700 US EXAM ABDOM COMPLETE: CPT

## 2022-06-02 ENCOUNTER — HOSPITAL ENCOUNTER (OUTPATIENT)
Dept: LAB | Age: 87
Discharge: HOME | End: 2022-06-05
Payer: MEDICARE

## 2022-06-02 ENCOUNTER — OFFICE VISIT (OUTPATIENT)
Dept: ONCOLOGY | Age: 87
End: 2022-06-02
Payer: MEDICARE

## 2022-06-02 VITALS
DIASTOLIC BLOOD PRESSURE: 65 MMHG | WEIGHT: 183.56 LBS | SYSTOLIC BLOOD PRESSURE: 144 MMHG | OXYGEN SATURATION: 99 % | TEMPERATURE: 97.7 F | BODY MASS INDEX: 27.19 KG/M2 | RESPIRATION RATE: 16 BRPM | HEART RATE: 59 BPM | HEIGHT: 69 IN

## 2022-06-02 DIAGNOSIS — Z85.05 HISTORY OF HEPATOCELLULAR CARCINOMA: Primary | ICD-10-CM

## 2022-06-02 DIAGNOSIS — C22.0 HEPATOCELLULAR CARCINOMA (HCC): ICD-10-CM

## 2022-06-02 LAB
AFP-TM SERPL-MCNC: 5.5 NG/ML
ALBUMIN SERPL-MCNC: 3.7 G/DL (ref 3.2–4.6)
ALBUMIN/GLOB SERPL: 1.1 {RATIO} (ref 1.2–3.5)
ALP SERPL-CCNC: 69 U/L (ref 50–136)
ALT SERPL-CCNC: 21 U/L (ref 12–65)
ANION GAP SERPL CALC-SCNC: 8 MMOL/L (ref 7–16)
AST SERPL-CCNC: 17 U/L (ref 15–37)
BASOPHILS # BLD: 0.1 K/UL (ref 0–0.2)
BASOPHILS NFR BLD: 1 % (ref 0–2)
BILIRUB SERPL-MCNC: 0.4 MG/DL (ref 0.2–1.1)
BUN SERPL-MCNC: 32 MG/DL (ref 8–23)
CALCIUM SERPL-MCNC: 9.1 MG/DL (ref 8.3–10.4)
CHLORIDE SERPL-SCNC: 106 MMOL/L (ref 98–107)
CO2 SERPL-SCNC: 23 MMOL/L (ref 21–32)
CREAT SERPL-MCNC: 2 MG/DL (ref 0.8–1.5)
DIFFERENTIAL METHOD BLD: ABNORMAL
EOSINOPHIL # BLD: 0.3 K/UL (ref 0–0.8)
EOSINOPHIL NFR BLD: 5 % (ref 0.5–7.8)
ERYTHROCYTE [DISTWIDTH] IN BLOOD BY AUTOMATED COUNT: 15.1 % (ref 11.9–14.6)
GLOBULIN SER CALC-MCNC: 3.5 G/DL (ref 2.3–3.5)
GLUCOSE SERPL-MCNC: 256 MG/DL (ref 65–100)
HCT VFR BLD AUTO: 44.6 %
HGB BLD-MCNC: 13.8 G/DL (ref 13.6–17.2)
IMM GRANULOCYTES # BLD AUTO: 0 K/UL (ref 0–0.5)
IMM GRANULOCYTES NFR BLD AUTO: 0 % (ref 0–5)
LYMPHOCYTES # BLD: 1.4 K/UL (ref 0.5–4.6)
LYMPHOCYTES NFR BLD: 22 % (ref 13–44)
MCH RBC QN AUTO: 26.4 PG (ref 26.1–32.9)
MCHC RBC AUTO-ENTMCNC: 30.9 G/DL (ref 31.4–35)
MCV RBC AUTO: 85.4 FL (ref 79.6–97.8)
MONOCYTES # BLD: 0.4 K/UL (ref 0.1–1.3)
MONOCYTES NFR BLD: 7 % (ref 4–12)
NEUTS SEG # BLD: 4.2 K/UL (ref 1.7–8.2)
NEUTS SEG NFR BLD: 65 % (ref 43–78)
NRBC # BLD: 0 K/UL (ref 0–0.2)
PLATELET # BLD AUTO: 196 K/UL (ref 150–450)
PMV BLD AUTO: 10.3 FL (ref 9.4–12.3)
POTASSIUM SERPL-SCNC: 4.3 MMOL/L (ref 3.5–5.1)
PROT SERPL-MCNC: 7.2 G/DL (ref 6.3–8.2)
RBC # BLD AUTO: 5.22 M/UL (ref 4.23–5.6)
SODIUM SERPL-SCNC: 137 MMOL/L (ref 136–145)
WBC # BLD AUTO: 6.4 K/UL (ref 4.3–11.1)

## 2022-06-02 PROCEDURE — 80053 COMPREHEN METABOLIC PANEL: CPT

## 2022-06-02 PROCEDURE — 36415 COLL VENOUS BLD VENIPUNCTURE: CPT

## 2022-06-02 PROCEDURE — 82105 ALPHA-FETOPROTEIN SERUM: CPT

## 2022-06-02 PROCEDURE — 99213 OFFICE O/P EST LOW 20 MIN: CPT | Performed by: INTERNAL MEDICINE

## 2022-06-02 PROCEDURE — G8419 CALC BMI OUT NRM PARAM NOF/U: HCPCS | Performed by: INTERNAL MEDICINE

## 2022-06-02 PROCEDURE — 85025 COMPLETE CBC W/AUTO DIFF WBC: CPT

## 2022-06-02 PROCEDURE — G8427 DOCREV CUR MEDS BY ELIG CLIN: HCPCS | Performed by: INTERNAL MEDICINE

## 2022-06-02 PROCEDURE — 1123F ACP DISCUSS/DSCN MKR DOCD: CPT | Performed by: INTERNAL MEDICINE

## 2022-06-02 PROCEDURE — 1036F TOBACCO NON-USER: CPT | Performed by: INTERNAL MEDICINE

## 2022-06-02 RX ORDER — ATORVASTATIN CALCIUM 20 MG/1
20 TABLET, FILM COATED ORAL
COMMUNITY
Start: 2022-04-12

## 2022-06-02 ASSESSMENT — PATIENT HEALTH QUESTIONNAIRE - PHQ9
SUM OF ALL RESPONSES TO PHQ9 QUESTIONS 1 & 2: 0
1. LITTLE INTEREST OR PLEASURE IN DOING THINGS: 0
2. FEELING DOWN, DEPRESSED OR HOPELESS: 0
SUM OF ALL RESPONSES TO PHQ QUESTIONS 1-9: 0

## 2022-06-02 NOTE — PROGRESS NOTES
[facility] Hematology & Oncology: Office Visit Progress Note      Chief Complaint:     Liver mass      History of Present Illness:   80 y.o. M admitted on 3/5/21 with a right ureteral stone. [facility] has a PMH of BPH, CAD, BPH, diverticulitis, and previous  TURP 20 years ago. [facility] presented to the ER on 3/2 with severe right flank pain.  He had a CT urogram on 3/2 which showed an obstructing 4 mm calculus in the proximal right ureter resulting in mild right hydroureteronephrosis with inflammatory stranding,  new heterogenous liver mass up to 6.4 cm, chronic pancreatitis, and prostatomegaly with urinary bladder wall thickening.          He had a cytoscopy on 3/5 and a right ureter stent placed with cooper insertion over wire.  He was noticed to have an increase in Cr (2x BL) at 3.25, now improved today to 2.63.  We were consulted given the new liver mass for our recommendations.                He received segment 6 liver resection with Dr. Regis Robertson on 5/12/2021: Interim history update in A/P. Review of Systems:      Constitutional   fatigue. Denies fever or chills.  Denies weight loss or appetite changes. Denies anorexia. HEENT  Denies trauma, bluring vision, ear pain, nosebleeds, sore throat, neck pain and ear discharge.    Difficulty hearing      Skin  Denies lesions or rashes. Hx of skin cancer     Lungs  Denies shortness of breath, cough, sputum production or hemoptysis. Cardiovascular  Denies chest pain, palpitations, orthopnea, claudication and leg swelling. Gastrointestinal  Denies nausea, vomiting, bowel changes.  Denies bloody or black stools. Denies abdominal pain.   Denies dysuria, frequency or hesitancy of urination   Waking to urinate 2x/night      Neuro  Denies visual changes or ataxia. Denies dizziness, tingling, tremors, sensory change, speech change, focal weakness and headaches.      Hematology  Denies nasal/gum bleeding, denies easy bruise     Endo  Denies heat/cold intolerance   Diabetes      MSK  Denies back pain, swollen legs, myalgias and falls. Psychiatric/Behavioral  Denies depression and substance abuse. The patient is not nervous/anxious.              Allergies   Allergen Reactions    Iohexol Anaphylaxis    Ciprofloxacin Hives    Codeine Other (See Comments)     Makes patient hyperactive    Penicillins Rash    Valdecoxib Hives and Rash     Vioxx     Past Medical History:   Diagnosis Date    Abnormal CT of liver 03/02/2021    New heterogeneous liver mass measuring up to 6.4 cm.  Further evaluation required    Anemia     GI esophageal ulcer per EGD- was given 2 units prbc's 3/12/21, eliquis has been stopped    AR (allergic rhinitis) 8/3/2016    Arthritis     Benign paroxysmal positional vertigo     Bilateral impacted cerumen     BPH (benign prostatic hyperplasia)     CAD (coronary artery disease)     2003 stent placed; MI in 2003     Chronic kidney disease     Stage III kidney disease elevated creatinine BUN    Chronic otitis media     Chronic pain     herniated disc in lower back; ESIs in recent past    Chronic pancreatitis ([facility]     Claustrophobia     Diverticulitis 8/3/2016    DNS (deviated nasal septum)     ETD (eustachian tube dysfunction)     GERD (gastroesophageal reflux disease)     controlled w/ prescription meds and OTC meds (pepcid)    H/O heart artery stent     S/P RCA stent by Dr. Loida Odonnell in 2003    Heart disease 8/3/2016    Hepatocellular carcinoma ([facility] [address] [facility]     High frequency hearing loss     History of bilateral inguinal hernia repair     History of gastroesophageal reflux (GERD)     controlled w/ med; no breakthrough GERD    Hx of exercise stress test 02/22/2019    EF on Stress test 71%    Hypercholesterolemia     takes statin and fish oil    Hyperlipidemia     takes statin and fish oil    Hypertrophy of both inferior nasal turbinates     Kidney stone     Liver disease     going for MRI; recent CT showed mass on liver    Liver mass 4/16/2021    MHL (mixed hearing loss)     NO (nasal obstruction)     Otitis media, nonsuppurative     SNHL (sensorineural hearing loss) 8/3/2016    Squamous cell carcinoma, arm     left forearm near wrist; several other sites as well    Type 2 diabetes mellitus (HCC)     insulin reliant/AVG FBS: 100-120/s.s of hypoglycemia at 70/last A1c:6.6    Unspecified sleep apnea     no cpap; patient states he lost weight and does not have ASHLEY anymore    Vertigo      Past Surgical History:   Procedure Laterality Date    APPENDECTOMY      CATARACT REMOVAL Bilateral     COLONOSCOPY      CORONARY ANGIOPLASTY WITH STENT PLACEMENT  2003    stent RCA 2003    CYSTOSCOPY,INSERT URETERAL STENT Right 03/05/2021    ERCP  12/2015    ENDOSCOPIC RETROGRADE CHOLANGIOPANCREATOGRAPHY (N/A Upper GI Region) ENDOSCOPIC SPHINCTEROTOMY (N/A [facility]) ENDOSCOPIC STONE EXTRACTION/BALLOON SWEEP (N/A Upper GI Region)    HERNIA REPAIR Left 2014    LIH with mesh    HERNIA REPAIR Bilateral 1980s    MASTOIDECTOMY  1996    right modified canal wall down    ORTHOPEDIC SURGERY Right 1/14/03    Dupuytrens contracture release    OTHER SURGICAL HISTORY      skin lesion; local excision: SCC    TURP      TURP      TYMPANOSTOMY TUBE PLACEMENT Right     tube right ear    UPPER GASTROINTESTINAL ENDOSCOPY  03/11/2021    US GUIDED LIVER BIOPSY PERCUTANEOUS  4/16/2021    US GUIDED LIVER BIOPSY PERCUTANEOUS 4/16/2021 [facility]    VITRECTOMY       Family History   Problem Relation Age of Onset    Cancer Maternal Grandmother         colon    Heart Disease Maternal Grandfather      Social History     Socioeconomic History    Marital status:      Spouse name: Not on file    Number of children: Not on file    Years of education: Not on file    Highest education level: Not on file   Occupational History    Not on file   Tobacco Use    Smoking status: Never Smoker    Smokeless tobacco: Never Used   Substance and Sexual Activity    Alcohol use: No    Drug use: No    Sexual activity: Not on file   Other Topics Concern    Not on file   Social History Narrative    Not on file     Social Determinants of Health     Financial Resource Strain:     Difficulty of Paying Living Expenses: Not on file   Food Insecurity:     Worried About Running Out of Food in the Last Year: Not on file    Lázaro of Food in the Last Year: Not on file   Transportation Needs:     Lack of Transportation (Medical): Not on file    Lack of Transportation (Non-Medical):  Not on file   Physical Activity:     Days of Exercise per Week: Not on file    Minutes of Exercise per Session: Not on file   Stress:     Feeling of Stress : Not on file   Social Connections:     Frequency of Communication with Friends and Family: Not on file    Frequency of Social Gatherings with Friends and Family: Not on file    Attends Religion Services: Not on file    Active Member of [address] [place] [address] or Organizations: Not on file    Attends Club or Organization Meetings: Not on file    Marital Status: Not on file   Intimate Partner Violence:     Fear of Current or Ex-Partner: Not on file    Emotionally Abused: Not on file    Physically Abused: Not on file    Sexually Abused: Not on file   Housing Stability:     Unable to Pay for Housing in the Last Year: Not on file    [facility] of [facility] in the Last Year: Not on file    Unstable Housing in the Last Year: Not on file     Current Outpatient Medications   Medication Sig Dispense Refill    atorvastatin (LIPITOR) 20 MG tablet Take 20 mg by mouth      aspirin 81 MG EC tablet Take 81 mg by mouth daily      vitamin D 25 MCG (1000 UT) CAPS Take 4,000 Units by mouth daily      insulin glargine (LANTUS) 100 UNIT/ML injection vial Inject 40 Units into the skin 2 times daily      lipase-protease-amylase (CREON) 94193-26962 units delayed release capsule Take 2 capsules by mouth 3 times daily (with meals)      pantoprazole (PROTONIX) 40 MG tablet Take 40 mg by mouth 2 times daily       No current facility-administered medications for this visit. OBJECTIVE:  BP (!) 144/65 (Site: Right Upper Arm, Position: Sitting, Cuff Size: Medium Adult)   Pulse 59   Temp 97.7 °F (36.5 °C) (Oral)   Resp 16   Ht 5' 9\" (1.753 m)   Wt 183 lb 9 oz (83.3 kg)   SpO2 99%   BMI 27.11 kg/m²     Physical Exam:  Constitutional: Oriented to person, place, and time. Well-developed and well-nourished. HEENT: Normocephalic and atraumatic. Oropharynx is clear and moist.   Conjunctivae and EOM are normal. Pupils are equal, round, and reactive to light. No scleral icterus. Neck supple. No JVD present. No tracheal deviation present. No thyromegaly present. Lymph node No palpable submandibular, cervical, supraclavicular, axillary and inguinal lymph nodes. Skin Warm and dry. No bruising and no rash noted. No erythema. No pallor. Respiratory Effort normal and breath sounds normal.  No respiratory distress. No wheezes. No rales. No tenderness. CVS Normal rate, regular rhythm and normal heart sounds. Exam reveals no gallop, no friction and no rub. No murmur heard. Abdomen Soft. Bowel sounds are normal. Exhibits no distension. There is no tenderness. There is no rebound and no guarding. Neuro Normal reflexes. No cranial nerve deficit. Exhibits normal muscle tone, 5 of 5 strength of all extremities. MSK Normal range of motion in general.  No edema and no tenderness.    Psych Normal mood, affect, behavior, judgment and thought content      Labs:  Recent Results (from the past 24 hour(s))   CBC with Auto Differential    Collection Time: 06/02/22  3:11 PM   Result Value Ref Range    WBC 6.4 4.3 - 11.1 K/uL    RBC 5.22 4.23 - 5.6 M/uL    Hemoglobin 13.8 13.6 - 17.2 g/dL    Hematocrit 44.6 %    MCV 85.4 79.6 - 97.8 FL    MCH 26.4 26.1 - 32.9 PG    MCHC 30.9 (L) 31.4 - 35.0 g/dL    RDW 15.1 (H) 11.9 - 14.6 %    Platelets 769 894 - 887 K/uL    MPV 10.3 9.4 - 12.3 FL    nRBC 0.00 0.0 - 0.2 K/uL    Differential Type AUTOMATED      Seg Neutrophils 65 43 - 78 %    Lymphocytes 22 13 - 44 %    Monocytes 7 4.0 - 12.0 %    Eosinophils % 5 0.5 - 7.8 %    Basophils 1 0.0 - 2.0 %    Immature Granulocytes 0 0.0 - 5.0 %    Segs Absolute 4.2 1.7 - 8.2 K/UL    Absolute Lymph # 1.4 0.5 - 4.6 K/UL    Absolute Mono # 0.4 0.1 - 1.3 K/UL    Absolute Eos # 0.3 0.0 - 0.8 K/UL    Basophils Absolute 0.1 0.0 - 0.2 K/UL    Absolute Immature Granulocyte 0.0 0.0 - 0.5 K/UL       Imaging:  No results found for this or any previous visit. ASSESSMENT/PLAN:   Diagnosis Orders   1. History of hepatocellular carcinoma  US ABDOMEN COMPLETE    AFP Tumor Marker    Comprehensive Metabolic Panel    CBC with Auto Differential     80 y.o. M consulted for liver mass. He was admitted for right flank pain and CT showed right ureter stone, also incidentally  found liver mass, I reviewed CT and discussed with pt to further evaluate with MRI or/and biopsy.  He reports severe claustrophobia, but can not have CT liver protocol given the Cr 2.66, willing to try MRI liver with ativan, and had to be done with open  MRI and followed in [facility] on 4/13/2021, we reviewed MRI result still being suspicious of malignancy however undiagnostic given the lack of contrast, discussed to pursue ultrasound-guided biopsy on 4/16/2021 showed well differentiated hepatocellular carcinoma,  I discussed the pathology result with patient and wife, discussed the need for staging image however given his increased creatinine level and already had a recent CT urogram for abdomen, we just added a CT chest without contrast showed nonspecific small  nodules 4 millimeter, consulted Dr. Marcellus Marsh and performed of segment 6 liver resection on 5/12/2021, final pathology showed 5.4 cm [address] stage IIIa, followed on 7/28/2021, recovered well, still feeling weak and we discussed physical therapy, discussed his hypertension  and reports normal measuring at home and considered whitecoat hypertension, discussed continue to have surveillance every 6 months with imaging and AFP, will mainly use ultrasound because MRI will be difficult due to the severe claustrophobia and CT would  be limited due to the increased creatinine. Return with daughter on 6/2/2022, ultrasound showed no recurrent disease in the liver, had a prolonged vertigo and follow ENT, blood pressure high in office again confirmed normal measurement at home, AFP pending and will call if needed, repeat ultrasound and labs in 6 months and return to clinic, call as needed. All questions are answered to their satisfaction. They will call for further questions and concerns. ECOG PERFORMANCE STATUS - 0-Fully active, able to carry on all pre-disease performance without restriction. Pain - 0 - No pain/10. None/Minimal pain - not affecting QOL     Fatigue - No flowsheet data found. Distress - No flowsheet data found. Elements of this note have been dictated via voice recognition software. Text and phrases may be limited by the accuracy and autoconversion of the software. The chart has been reviewed, but errors may still be present. Nolberto Marks M.D.   Adama Claudio  [address] [place] [address]  Office : [phone]  Fax : [phone]

## 2022-06-02 NOTE — PATIENT INSTRUCTIONS
Patient Instructions from Today's Visit    Reason for Visit:  Follow-up visit for [address]    Diagnosis Information:  [email or web address]      Plan:  -The ultrasound looked good! We will repeat another one in 6 months to monitor.   -You should start checking your blood pressure at home in the morning and at night while at rest. The goal for the top number of your BP is 110-140.      Follow Up:  -6 months    Recent Lab Results:  Hospital Outpatient Visit on 06/02/2022   Component Date Value Ref Range Status    WBC 06/02/2022 6.4  4.3 - 11.1 K/uL Final    RBC 06/02/2022 5.22  4.23 - 5.6 M/uL Final    Hemoglobin 06/02/2022 13.8  13.6 - 17.2 g/dL Final    Hematocrit 06/02/2022 44.6  % Final    MCV 06/02/2022 85.4  79.6 - 97.8 FL Final    MCH 06/02/2022 26.4  26.1 - 32.9 PG Final    MCHC 06/02/2022 30.9* 31.4 - 35.0 g/dL Final    RDW 06/02/2022 15.1* 11.9 - 14.6 % Final    Platelets 28/15/6528 196  150 - 450 K/uL Final    MPV 06/02/2022 10.3  9.4 - 12.3 FL Final    nRBC 06/02/2022 0.00  0.0 - 0.2 K/uL Final    **Note: Absolute NRBC parameter is now reported with Hemogram**    Differential Type 06/02/2022 AUTOMATED    Final    Seg Neutrophils 06/02/2022 65  43 - 78 % Final    Lymphocytes 06/02/2022 22  13 - 44 % Final    Monocytes 06/02/2022 7  4.0 - 12.0 % Final    Eosinophils % 06/02/2022 5  0.5 - 7.8 % Final    Basophils 06/02/2022 1  0.0 - 2.0 % Final    Immature Granulocytes 06/02/2022 0  0.0 - 5.0 % Final    Segs Absolute 06/02/2022 4.2  1.7 - 8.2 K/UL Final    Absolute Lymph # 06/02/2022 1.4  0.5 - 4.6 K/UL Final    Absolute Mono # 06/02/2022 0.4  0.1 - 1.3 K/UL Final    Absolute Eos # 06/02/2022 0.3  0.0 - 0.8 K/UL Final    Basophils Absolute 06/02/2022 0.1  0.0 - 0.2 K/UL Final    Absolute Immature Granulocyte 06/02/2022 0.0  0.0 - 0.5 K/UL Final       Treatment Summary has been discussed and given to patient: N/A      -------------------------------------------------------------------------------------------------------------------  Please call our office at [phone] if you have any  of the following symptoms:   · Fever of 100.5 or greater  · Chills  · Shortness of breath  · Swelling or pain in one leg    After office hours an answering service is available and will contact a provider for emergencies or if you are experiencing any of the above symptoms.  Patient does express an interest in My Chart. My Chart log in information explained on the after visit summary printout at the [address] desk.     Rai Segura RN

## 2022-07-19 ENCOUNTER — OFFICE VISIT (OUTPATIENT)
Dept: ENT CLINIC | Age: 87
End: 2022-07-19
Payer: MEDICARE

## 2022-07-19 VITALS
BODY MASS INDEX: 26.81 KG/M2 | SYSTOLIC BLOOD PRESSURE: 126 MMHG | HEIGHT: 69 IN | DIASTOLIC BLOOD PRESSURE: 72 MMHG | WEIGHT: 181 LBS

## 2022-07-19 DIAGNOSIS — R42 DIZZINESS: Primary | ICD-10-CM

## 2022-07-19 DIAGNOSIS — R42 DISEQUILIBRIUM: ICD-10-CM

## 2022-07-19 DIAGNOSIS — Z90.89 HISTORY OF RIGHT MASTOIDECTOMY: ICD-10-CM

## 2022-07-19 DIAGNOSIS — H61.23 EXCESSIVE CERUMEN IN EAR CANAL, BILATERAL: ICD-10-CM

## 2022-07-19 PROCEDURE — 69220 CLEAN OUT MASTOID CAVITY: CPT | Performed by: STUDENT IN AN ORGANIZED HEALTH CARE EDUCATION/TRAINING PROGRAM

## 2022-07-19 PROCEDURE — G8427 DOCREV CUR MEDS BY ELIG CLIN: HCPCS | Performed by: STUDENT IN AN ORGANIZED HEALTH CARE EDUCATION/TRAINING PROGRAM

## 2022-07-19 PROCEDURE — 1123F ACP DISCUSS/DSCN MKR DOCD: CPT | Performed by: STUDENT IN AN ORGANIZED HEALTH CARE EDUCATION/TRAINING PROGRAM

## 2022-07-19 PROCEDURE — 99214 OFFICE O/P EST MOD 30 MIN: CPT | Performed by: STUDENT IN AN ORGANIZED HEALTH CARE EDUCATION/TRAINING PROGRAM

## 2022-07-19 PROCEDURE — G8417 CALC BMI ABV UP PARAM F/U: HCPCS | Performed by: STUDENT IN AN ORGANIZED HEALTH CARE EDUCATION/TRAINING PROGRAM

## 2022-07-19 PROCEDURE — 1036F TOBACCO NON-USER: CPT | Performed by: STUDENT IN AN ORGANIZED HEALTH CARE EDUCATION/TRAINING PROGRAM

## 2022-07-19 RX ORDER — PANTOPRAZOLE SODIUM 20 MG/1
TABLET, DELAYED RELEASE ORAL
COMMUNITY
Start: 2022-05-09

## 2022-07-19 RX ORDER — INSULIN DEGLUDEC INJECTION 100 U/ML
34 INJECTION, SOLUTION SUBCUTANEOUS EVERY MORNING
COMMUNITY
Start: 2022-06-30

## 2022-07-19 ASSESSMENT — ENCOUNTER SYMPTOMS
COLOR CHANGE: 0
EYE DISCHARGE: 0
ABDOMINAL DISTENTION: 0
APNEA: 0

## 2022-07-19 NOTE — PROGRESS NOTES
[facility] ENT Office Note    Patient: Marco Kong  MRN: [id number]  : 1933  Gender:  male  Vital Signs: /72   Ht 5' 9\" (1.753 m)   Wt 181 lb (82.1 kg)   BMI 26.73 kg/m²   Date: 2022    CC:   Chief Complaint   Patient presents with    Dizziness     Patient states that he has daily vertigo attacks for the last few weeks . Patient states that these last for hours. HPI:  Marco Kong is a 80 y.o. male history of a right-sided mastoidectomy in the distant past.  He comes in over 6 months for mastoid debridements. No drainage from his right ear. He has a long history of vertigo but this is been bothering him more recently. He has done the Epley maneuver in the past but that has not been helping recently. He describes his dizziness is vertigo. The episodes last 5 hours and are associated with nausea. This is occurring nearly every day. He has had home physical therapy recently but they did not perform vestibular therapy. Last saw neurology in 2019 and MRI was recommended but he was unable to get this secondary to claustrophobia. He cannot take Antivert secondary to constipation. He has Ativan now. He uses a cane. He denies any recent falls. He describes his vertigo is severe. He wears hearing aids bilaterally. 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 Past Medical History, Past Surgical History, Family history, Social History, and Medications were all reviewed with the patient today and updated as necessary. Allergies   Allergen Reactions    Iohexol Anaphylaxis     Other reaction(s): Anaphylactic shock-Allergy    Atorvastatin Other (See Comments)     Pt not allergic.  Currently taking  Other reaction(s): Myalgia-Intolerance, Unknown-Unspecified  Currently tolerating and currently taking    Ciprofloxacin Hives    Codeine Other (See Comments)     Makes patient hyperactive  Other reaction(s): Unknown-Unspecified  Makes patient hyperactive    Penicillins Rash     Other reaction(s): Rash-Allergy    Valdecoxib Hives and Rash     Vioxx     Patient Active Problem List   Diagnosis    Family history of MI (myocardial infarction)    Mixed hyperlipidemia    AR (allergic rhinitis)    History of PTCA    Hepatocellular carcinoma ([facility] [address]    Essential hypertension with goal blood pressure less than 130/85    Hyperlipidemia    Chest pain at rest    Personal history of allergy to radiographic contrast media    Vasovagal syncope    Gastroenteritis    SNHL (sensorineural hearing loss)    Diverticulitis    Heart disease    Type 2 diabetes mellitus without complication (HCC)    Acute pancreatitis    Diabetes mellitus (HCC)    BPH (benign prostatic hyperplasia)    Hypertrophy of both inferior nasal turbinates    Dizziness    Chronic otitis media    Weakness generalized    Right ureteral stone    Liver mass    GI bleed    Inguinal hernia, left    Coronary artery disease involving native coronary artery of native heart without angina pectoris    Stage 3 chronic kidney disease (HCC)     Current Outpatient Medications   Medication Sig    insulin aspart (NOVOLOG) 100 UNIT/ML injection pen 14 units + 1:50 sliding scale with breakfast and lunch, 12 units +1:50 sliding scale with dinner    Insulin Degludec (TRESIBA FLEXTOUCH) 100 UNIT/ML SOPN Inject 34 Units into the skin every morning    pantoprazole (PROTONIX) 20 MG tablet TAKE 1 TABLET BY MOUTH EVERY DAY    atorvastatin (LIPITOR) 20 MG tablet Take 20 mg by mouth    aspirin 81 MG EC tablet Take 81 mg by mouth daily    vitamin D 25 MCG (1000 UT) CAPS Take 4,000 Units by mouth daily    insulin glargine (LANTUS) 100 UNIT/ML injection vial Inject 40 Units into the skin 2 times daily    lipase-protease-amylase (CREON) 55323-94717 units delayed release capsule Take 2 capsules by mouth 3 times daily (with meals)    pantoprazole (PROTONIX) 40 MG tablet Take 40 mg by mouth 2 times daily     No current facility-administered medications for this visit.      Past Medical History:   Diagnosis Date    Abnormal CT of liver 03/02/2021    New heterogeneous liver mass measuring up to 6.4 cm.  Further evaluation required    Anemia     GI esophageal ulcer per EGD- was given 2 units prbc's 3/12/21, eliquis has been stopped    AR (allergic rhinitis) 8/3/2016    Arthritis     Benign paroxysmal positional vertigo     Bilateral impacted cerumen     BPH (benign prostatic hyperplasia)     CAD (coronary artery disease)     2003 stent placed; MI in 2003     Chronic kidney disease     Stage III kidney disease elevated creatinine BUN    Chronic otitis media     Chronic pain     herniated disc in lower back; ESIs in recent past    Chronic pancreatitis ([facility] [address]     Claustrophobia     Diverticulitis 8/3/2016    DNS (deviated nasal septum)     ETD (eustachian tube dysfunction)     GERD (gastroesophageal reflux disease)     controlled w/ prescription meds and OTC meds (pepcid)    H/O heart artery stent     S/P RCA stent by Dr. Shannon Ramos in 2003    Heart disease 8/3/2016    Hepatocellular carcinoma ([facility] [address]     High frequency hearing loss     History of bilateral inguinal hernia repair     History of gastroesophageal reflux (GERD)     controlled w/ med; no breakthrough GERD    Hx of exercise stress test 02/22/2019    EF on Stress test 71%    Hypercholesterolemia     takes statin and fish oil    Hyperlipidemia     takes statin and fish oil    Hypertrophy of both inferior nasal turbinates     Kidney stone     Liver disease     going for MRI; recent CT showed mass on liver    Liver mass 4/16/2021    MHL (mixed hearing loss)     NO (nasal obstruction)     Otitis media, nonsuppurative     SNHL (sensorineural hearing loss) 8/3/2016    Squamous cell carcinoma, arm     left forearm near wrist; several other sites as well    Type 2 diabetes mellitus (HCC)     insulin reliant/AVG FBS: 100-120/s.s of hypoglycemia at 70/last A1c:6.6    Unspecified sleep apnea     no cpap; patient states he lost weight and does not have ASHLEY anymore Vertigo      Social History     Tobacco Use    Smoking status: Never    Smokeless tobacco: Never   Substance Use Topics    Alcohol use: No     Past Surgical History:   Procedure Laterality Date    APPENDECTOMY      CATARACT REMOVAL Bilateral     COLONOSCOPY      CORONARY ANGIOPLASTY WITH STENT PLACEMENT  2003    stent RCA 2003    CYSTOSCOPY,INSERT URETERAL STENT Right 03/05/2021    ERCP  12/2015    ENDOSCOPIC RETROGRADE CHOLANGIOPANCREATOGRAPHY (N/A Upper GI Region) ENDOSCOPIC SPHINCTEROTOMY (N/A Upper GI Region) ENDOSCOPIC STONE EXTRACTION/BALLOON SWEEP (N/A Upper GI Region)    HERNIA REPAIR Left 2014    LIH with mesh    HERNIA REPAIR Bilateral 1980s    MASTOIDECTOMY  1996    right modified canal wall down    ORTHOPEDIC SURGERY Right 1/14/03    Dupuytrens contracture release    OTHER SURGICAL HISTORY      skin lesion; local excision: SCC    TURP      TURP      TYMPANOSTOMY TUBE PLACEMENT Right     tube right ear    UPPER GASTROINTESTINAL ENDOSCOPY  03/11/2021    US GUIDED LIVER BIOPSY PERCUTANEOUS  4/16/2021    US GUIDED LIVER BIOPSY PERCUTANEOUS 4/16/2021 [facility]    VITRECTOMY       Family History   Problem Relation Age of Onset    Cancer Maternal Grandmother         colon    Heart Disease Maternal Grandfather         ROS:    Review of Systems   Constitutional:  Negative for activity change. HENT:  Negative for congestion. Eyes:  Negative for discharge. Respiratory:  Negative for apnea. Cardiovascular:  Negative for chest pain. Gastrointestinal:  Negative for abdominal distention. Endocrine: Negative for cold intolerance. Genitourinary:  Negative for difficulty urinating. Musculoskeletal:  Negative for arthralgias. Skin:  Negative for color change. Allergic/Immunologic: Negative for environmental allergies. Neurological:  Positive for dizziness. Hematological:  Negative for adenopathy. Psychiatric/Behavioral:  Negative for agitation.          PHYSICAL EXAM:    /72   Ht 5' 9\" (1.753 m)   Wt 181 lb (82.1 kg)   BMI 26.73 kg/m²     General: NAD, well-appearing  Neuro: No gross neuro deficits. CN's II-XII intact. No facial weakness. Eyes: Blind right eye. Skin: No facial erythema, rashes or concerning lesions. Nose: No external deviations or saddling. Intranasally, septum is midline without perforations, nasal mucosa appears healthy with no erythema, mucopurulence, or polyps. Mouth: Moist mucus membranes, normal tongue/palate mobility, no concerning mucosal lesions. Oropharynx: clear with no erythema/exudate, no tonsillar hypertrophy. Ears: Right mastoid cavity with partial cerumen impaction that was debrided, left side clear  [place]-Hallpike negative bilaterally  Neck: Soft, supple, no palpable lateral neck masses. No parotid or submandibular masses. No thyromegaly or palpable thyroid nodules. No surgical scars. Lymphatics: No palpable cervical LAD. Resp/Lungs: No audible stridor or wheezing, CTAB  Heart: RRR  Extremities: No clubbing or cyanosis. PROCEDURE: Right mastoid debridement under binocular microscopy  INDICATIONS: Cerumen impaction, right mastoid cavity  DESCRIPTION: After verbal consent was obtained and a timeout was performed, the otologic microscope was used to visualize both ears. There were normal appearing auricles bilaterally. There is a right mastoid cavity with partial cerumen impaction that was debrided using suction. Ear otherwise healthy-appearing. The patient tolerated the procedure well and there were no complications.     Lab Results   Component Value Date    WBC 6.4 06/02/2022    HGB 13.8 06/02/2022    HCT 44.6 06/02/2022    MCV 85.4 06/02/2022     06/02/2022     Lab Results   Component Value Date/Time     06/02/2022 03:11 PM    K 4.3 06/02/2022 03:11 PM     06/02/2022 03:11 PM    CO2 23 06/02/2022 03:11 PM    BUN 32 06/02/2022 03:11 PM    CREATININE 2.00 06/02/2022 03:11 PM    GLUCOSE 256 06/02/2022 03:11 PM    CALCIUM 9.1 06/02/2022 03:11 PM        ASSESSMENT and PLAN  80year-old gentleman with vertigo that is likely multifactorial in nature. Has a history of a right canal down mastoidectomy that was cleaned today. No signs of infection or other abnormality. Jose Armando-Hallpike was negative today. 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 I will send him to physical therapy for vestibular therapy. I will get an MRI of his brain he can take Ativan prior. I will see if he can get back in with neurology for evaluation as well. ICD-10-CM    1. Dizziness  R42 MRI C/ [address]     [facility] - Physical Therapy, [facility]     [id number] [facility] , [place], Neurology, [facility]      2. Disequilibrium  R42       3. Excessive cerumen in ear canal, bilateral  H61.23 DEBRIDE MASTOID CAVITY,SIMPLE      4.  History of right mastoidectomy  Z90.89 DEBRIDE MASTOID CAVITY,SIMPLE            Telma Bates MD  7/19/2022  Electronically signed

## 2022-07-22 ENCOUNTER — HOSPITAL ENCOUNTER (OUTPATIENT)
Dept: PHYSICAL THERAPY | Age: 87
Setting detail: RECURRING SERIES
Discharge: HOME | End: 2022-07-25
Payer: MEDICARE

## 2022-07-22 PROCEDURE — 97162 PT EVAL MOD COMPLEX 30 MIN: CPT

## 2022-07-22 PROCEDURE — 97112 NEUROMUSCULAR REEDUCATION: CPT

## 2022-07-22 ASSESSMENT — PAIN SCALES - GENERAL: PAINLEVEL_OUTOF10: 0

## 2022-07-22 NOTE — PROGRESS NOTES
Sandie Ash  : 1933  Primary: Medicare Part A And B  Secondary: [address] @ [facility]  [place] [address]  Phone: [phone]  Fax: [phone] Plan Frequency: 1-2 times a week for 90 days    Plan of Care/Certification Expiration Date: 10/20/22      PT Visit Info: Total # of Visits to Date: 1      OUTPATIENT PHYSICAL THERAPY:OP NOTE TYPE: Treatment Note 2022       Episode  }Appt Desk             Treatment Diagnosis:  Difficulty in walking, Not elsewhere classified (R26.2)  Dizziness and Giddiness (R42)* No diagnoses found *  Medical/Referring Diagnosis:  No admission diagnoses are documented for this encounter. Referring Physician:  Gabe Verduzco MD MD Orders:  PT Eval and Treat   Date of Onset:  Onset Date:  (Chronic)     Allergies:   Iohexol, Atorvastatin, Ciprofloxacin, Codeine, Penicillins, and Valdecoxib  Restrictions/Precautions:  No data recordedNo data recorded   Interventions Planned (Treatment may consist of any combination of the following):    Current Treatment Recommendations: Balance training; Gait training; Vestibular rehab; Home exercise program     Subjective Comments:  Patient reports vertigo has gotten worse over the past three months. Initial:}    0/10Post Session:       0/10  Medications Last Reviewed:  2022  Updated Objective Findings:  See evaluation note from today  Treatment   NEUROMUSCULAR RE-EDUCATION: (10 minutes):    Exercise/activities per grid below to improve balance and dizziness . Required minimal verbal cues to promote dynamic balance in standing.    Date:  2022 Date:   Date:     Activity/Exercise Parameters Parameters Parameters   Standing looking left and right 5 reps bilateral     Standing looking up and down 5 reps bilateral     Turning 180s 5 reps                                 Treatment/Session Summary:    Treatment Assessment:  Patient reports increased dizziness with exercises. Dizziness decreased quickly with rest.  Communication/Consultation:  None today  Equipment provided today:  Home exercise program  Recommendations/Intent for next treatment session: Next visit will focus on habituation exercises, balance exercises, and gait.     Total Treatment Billable Duration:  10 minutes+ evaluation  Time In: 8948  Time Out: 2871    JUAN Hoffmann, PT       Charge Capture  }Post Session Pain  PT Visit Info  [employer] Portal  MD Guidelines  Scanned Media  Benefits  MyChart    Future Appointments   Date Time Provider Jamila Menjivar   7/25/2022  1:45 PM Rada Dancer, PT [facility]   7/28/2022  4:00 PM Juan Pete, PT [facility]   8/17/2022 10:15 AM Bhavin Spence MD ENT [facility]   9/29/2022  1:15 PM Lien Oilvares MD [facility]   10/17/2022 10:15 AM Verna Gitelman, MD [id number]   11/25/2022  9:30 AM [facility] US GE LOGIC E9 ROOM 2 [facility]   12/7/2022  3:00 PM Davion [address] [place] [address]   12/7/2022  3:30 PM Fabio Hollis MD [facility]   1/19/2023 10:15 AM Bhavin Spence MD ENT [facility]

## 2022-07-22 NOTE — THERAPY EVALUATION
artery disease), Chronic kidney disease, Chronic otitis media, Chronic pain, Chronic pancreatitis ([address] Claustrophobia, Diverticulitis, DNS (deviated nasal septum), ETD (eustachian tube dysfunction), GERD (gastroesophageal reflux disease), H/O heart artery stent, Heart disease, Hepatocellular carcinoma (HCC), High frequency hearing loss, History of bilateral inguinal hernia repair, History of gastroesophageal reflux (GERD), Hx of exercise stress test, Hypercholesterolemia, Hyperlipidemia, Hypertrophy of both inferior nasal turbinates, Kidney stone, Liver disease, Liver mass, MHL (mixed hearing loss), NO (nasal obstruction), Otitis media, nonsuppurative, SNHL (sensorineural hearing loss), Squamous cell carcinoma, arm, Type 2 diabetes mellitus ([address] Unspecified sleep apnea, and Vertigo. Mr. Luna Alas  has a past surgical history that includes cystoscopy,insert ureteral stent (Right, 03/05/2021); ERCP (12/2015); hernia repair (Left, 2014); orthopedic surgery (Right, 1/14/03); TURP; Appendectomy; hernia repair (Bilateral, 1980s); other surgical history; mastoidectomy (1996); vitrectomy; Cataract removal (Bilateral); Colonoscopy; TURP; Coronary angioplasty with stent (2003); Tympanostomy tube placement (Right); Upper gastrointestinal endoscopy (03/11/2021); and US BIOPSY LIVER PERCUTANEOUS (4/16/2021). Social History/Living Environment:   Lives With: Spouse  Type of Home: House  Home Layout: One level   Prior Level of Function/Work/Activity:   Prior level of function: Independent  Occupation: [id number]   Learning:   Does the patient/guardian have any barriers to learning?: No barriers  Will there be a co-learner?: No  What is the preferred language of the patient/guardian?: English  Is an  required?: No  How does the patient/guardian prefer to learn new concepts?: Listening; Reading; Demonstration   Fall Risk Scale:    Total Score: 25  Angela Fall Risk: Medium (25-44)   Dominant Side:  right handed  Personal Factors:        Sex:  male        Age:  80 y.o. OBJECTIVE   Balance / Vestibular:   Visual/Oculomotor:  Visual/Occulomotor Assessed: Yes  Spontaneous Nystagmus: Intact  Gaze Holding Nystagmus: No  Eye Movement Range: Normal  Smooth pursuits horizontal: Able to track stimulus in all quadrants without difficulty  Smooth pursuits vertical: Able to track stimulus in all quadrants without difficulty  Saccades horizontal:  Within Defined Limits  Saccades vertical: Within Defined Limits  Balance Outcome Measures:  Zamorano Balance Scale:  44 /56  (A score less than 45/56 indicates high risk of falls)  Dizziness Handicap Inventory:   56    Scores:  Scores greater than 10 points should be referred to balance specialists for further evaluation. 16-34 Points (mild handicap)  36-52 Points (moderate handicap)  54+ Points (severe handicap)  Dynamic Gait Index:  15 /24  (A score less than or equal to19/24 is abnormal and predictive of falls)   ASSESSMENT   Initial Assessment:  Patient presents with dizziness, imbalance, and difficulty walking. Patient is at higher fall risk based on scores received on Zamorano Balance Scale,  Dizziness Handicap Inventory, and Dynamic Gait Index. Patient would benefit from skilled physical therapy to address problems and goals. Thank you for this referral.   Problem List: (Impacting functional limitations): Body Structures, Functions, Activity Limitations Requiring Skilled Therapeutic Intervention: Decreased functional mobility ;  Decreased balance; Vestibular Impairment   Therapy Prognosis:   Therapy Prognosis: Good   Assessment Complexity:   Medium Complexity  PLAN   Effective Dates: 7/22/022 TO Plan of Care/Certification Expiration Date: 10/20/22   Frequency/Duration: Plan Frequency: 1-2 times a week for 90 days   Interventions Planned (Treatment may consist of any combination of the following):    Current Treatment Recommendations: Balance training; Gait training; Vestibular rehab; Home exercise program   Goals: (Goals have been discussed and agreed upon with patient.)  Short-Term Functional Goals: Time Frame: 30 days   Patient will be independent with home exercise program to improve balance and decrease dizziness. Discharge Goals: Time Frame: 90 days   Patient will increase score on Dynamic Gait Index greater than or equal to 20/24 demonstrating improved balance and decreased fall risk with daily activities. Patient will increase score on Zamorano Balance Scale greater than or equal to 49/56 demonstrating improved balance and decreased fall risk with daily activities. Patient will decrease score on Dizziness Handicap Inventory greater than or equal to 35 demonstrating improvement. Patient will report improved stability with activities of daily living. Outcome Measure: Tool Used: Zamorano Balance Scale  Score:  Initial:   44/56 Most Recent: X/56 (Date: -- )   Interpretation of Score: Each section is scored on a 0-4 scale, 0 representing the patients inability to perform the task and 4 representing independence. The scores of each section are added together for a total score of 56. The higher the patients score, the more independent the patient is. Any score below 45 indicates increased risk for falls. Tool Used: Dynamic Gait Index  Score:  Initial: 15/24 Most Recent: X/24 (Date: -- )   Interpretation of Score: Each section is scored on a 0-3 scale, 0 representing the patients inability to perform the task and 3 representing independence. The scores of each section are added together for a total score of 24. Any score below 19 indicates increased risk for falls. Tool Used: Dizziness Handicap Index  Score:  Initial: 56  Most Recent:  (Date: -- )   Interpretation of Score: To each item, the following scores may be assigned: No = 0, Sometimes = 2, Yes = 4.   Scores greater than 10 points should be referred to balance specialists for further evaluation. Medical Necessity:   > Patient is expected to demonstrate progress in balance and dizziness to improve safety during activities of daily living. Reason For Services/Other Comments:  > Patient continues to require skilled intervention due to higher fall risk with daily activities. Total Duration:  Time In: 1520  Time Out: 1605    Regarding Sami Bazzi's therapy, I certify that the treatment plan above will be carried out by a therapist or under their direction.   Thank you for this referral,  Basilia Roland, PT     Referring Physician Signature: Carlos Newby MD _______________________________ Date _____________        Post Session Pain  Charge Capture  PT Visit Info  POC Link  Treatment Note Link  MD Guidelines  MyChart

## 2022-07-25 ENCOUNTER — HOSPITAL ENCOUNTER (OUTPATIENT)
Dept: PHYSICAL THERAPY | Age: 87
Setting detail: RECURRING SERIES
Discharge: HOME | End: 2022-07-28
Payer: MEDICARE

## 2022-07-25 PROCEDURE — 97112 NEUROMUSCULAR REEDUCATION: CPT

## 2022-07-25 ASSESSMENT — PAIN SCALES - GENERAL: PAINLEVEL_OUTOF10: 0

## 2022-07-25 NOTE — PROGRESS NOTES
Wesly Fuentes  : 1933  Primary: Medicare Part A And B  Secondary: [address] @ [address] [address]  Phone: [phone]  Fax: [phone] Plan Frequency: 1-2 times a week for 90 days    Plan of Care/Certification Expiration Date: 10/20/22      PT Visit Info: Total # of Visits to Date: 2      OUTPATIENT PHYSICAL THERAPY:OP NOTE TYPE: Treatment Note 2022       Episode  }Appt Desk             Treatment Diagnosis:  Difficulty in walking, Not elsewhere classified (R26.2)  Dizziness and Giddiness (R42)* No diagnoses found *  Medical/Referring Diagnosis:  No admission diagnoses are documented for this encounter. Referring Physician:  Daniel Spivey MD MD Orders:  PT Eval and Treat   Date of Onset:  Onset Date:  (Chronic)     Allergies:   Iohexol, Atorvastatin, Ciprofloxacin, Codeine, Penicillins, and Valdecoxib  Restrictions/Precautions:  No data recordedNo data recorded   Interventions Planned (Treatment may consist of any combination of the following):    Current Treatment Recommendations: Balance training; Gait training; Vestibular rehab; Home exercise program     Subjective Comments:  \"I was really dizzy this morning. I am feeling better right now. Dizziness 3/10\". Initial:}    010Post Session:       010  Medications Last Reviewed:  2022  Updated Objective Findings:  None Today  Treatment   NEUROMUSCULAR RE-EDUCATION: (45 minutes):    Exercise/activities per grid below to improve balance and dizziness . Required minimal verbal cues to promote dynamic balance in standing.     Balance/Vestibular Treatment:  Activity    Date  2022 Date   Date Date Date Date   Activity/Exercise    Sets/reps/equipment Sets/reps/  equipment Sets/reps/  equipment Sets/reps/  equipment Sets/reps/  equipment Sets/reps/  equipment   Walking with head turns      4 laps             Walking with head up & down 4 laps             Step ups                   Step taps                   Marching    4 laps            Sidestepping    4 laps            Crossovers                  [place]                  Walking  backwards                    Tandem walking                  Weaving in/out of cones       4 laps             Picking up cones                  Stepping over half foam                Ball toss                  Kick ball                  Figure 8s                  Circles right/left     2 reps each             Walking with 360 degree turns                  Stepping over half foam  Forward 10 reps bilateral  Lateral 10 reps bilateral             Weight shifting:    Left & Right                     Weight shifting: Forward & Backward                     Static Standing Balance                     Standing with feet apart                   Standing with feet together                     Standing with feet semitandem                  Standing with feet tandem                  Single leg stance                  X1/X2 Viewing exercises                     Hallpike-[place] testing for BPPV (Benign Paroxysmal Positional Vertigo)                     Berry-Daroff exercises                  Canalith Repositioning treatment/Epley Maneuver  for BPPV (Benign Paroxysmal Positional Vertigo)                  Smart Equitest Training: See scanned report. Treatment/Session Summary:    Treatment Assessment:  Patient reports increase in dizziness to 6/10 after treatment. Continue to progress to tolerance. Communication/Consultation:  None today  Equipment provided today:  Home exercise program  Recommendations/Intent for next treatment session: Next visit will focus on habituation exercises, balance exercises, and gait.     Total Treatment Billable Duration:  45 minutes       JUAN SEALS, PT       Charge Capture  }Post Session Pain  PT Visit Info  [employer] Portal  MD Guidelines  Scanned Media  Benefits [employer]    Future Appointments   Date Time Provider Jamila Menjivar   7/28/2022  4:00 PM Veleria Body FLORIAN, [place] [facility]   8/2/2022  2:30 PM Rafa Casper, PT [facility]FE   8/4/2022  1:45 PM Rafa Casper, PT [facility] SFE   8/17/2022 10:15 AM Dom Menon MD [facility]   9/29/2022  1:15 PM Paul Treviño MD [facility]   10/17/2022 10:15 AM Nate Dorado MD [id number] [facility] AMB   11/25/2022  9:30 AM SFE US GE LOGIC E9 ROOM 2 [facility]US SFE   12/7/2022  3:00 PM Davion [address] [place] [address]   12/7/2022  3:30 PM Abdoulaye Cobian MD [facility]   1/19/2023 10:15 AM Dom Menon MD [facility] AM[facility]

## 2022-07-28 ENCOUNTER — HOSPITAL ENCOUNTER (OUTPATIENT)
Dept: PHYSICAL THERAPY | Age: 87
Setting detail: RECURRING SERIES
Discharge: HOME | End: 2022-07-31
Payer: MEDICARE

## 2022-07-28 PROCEDURE — 97112 NEUROMUSCULAR REEDUCATION: CPT

## 2022-07-28 ASSESSMENT — PAIN SCALES - GENERAL: PAINLEVEL_OUTOF10: 0

## 2022-07-28 NOTE — PROGRESS NOTES
Safia Campbell  : 1933  Primary: Medicare Part A And B  Secondary: [address] @ [address] [address]  Phone: [phone]  Fax: [phone] Plan Frequency: 1-2 times a week for 90 days    Plan of Care/Certification Expiration Date: 10/20/22      PT Visit Info: Total # of Visits to Date: 3      OUTPATIENT PHYSICAL THERAPY:OP NOTE TYPE: Treatment Note 2022       Episode  }Appt Desk             Treatment Diagnosis:    Difficulty in walking, Not elsewhere classified (R26.2)  Dizziness and Giddiness (R42)  Medical/Referring Diagnosis:  No admission diagnoses are documented for this encounter. Referring Physician:  Noemy Almanza MD MD Orders:  PT Eval and Treat   Date of Onset:  Onset Date:  (Chronic)     Allergies:   Iohexol, Atorvastatin, Ciprofloxacin, Codeine, Penicillins, and Valdecoxib  Restrictions/Precautions:  No data recordedNo data recorded   Interventions Planned (Treatment may consist of any combination of the following):    Current Treatment Recommendations: Balance training; Gait training; Vestibular rehab; Home exercise program     Subjective Comments:  Patient reports he is dizzy today  Initial:}    0/10Post Session:       0/10  Medications Last Reviewed:  2022  Updated Objective Findings:  None Today  Treatment   NEUROMUSCULAR RE-EDUCATION: (45 minutes):    Exercise/activities per grid below to improve balance and dizziness . Required minimal verbal cues to promote dynamic balance in standing.   Balance/Vestibular Treatment:  Activity    Date  2022 Date  2022 Date Date Date Date   Activity/Exercise    Sets/reps/equipment Sets/reps/  equipment Sets/reps/  equipment Sets/reps/  equipment Sets/reps/  equipment Sets/reps/  equipment   Walking with head turns      4 laps  4 laps           Walking with head up & down       4 laps   4 laps           Step ups Step taps                   Marching    4 laps  4 laps           Sidestepping    4 laps  4 laps           Crossovers                  [place]                  Walking  backwards                    Tandem walking                  Weaving in/out of cones       4 laps   4 laps           Picking up cones                  Stepping over half foam                [employer]ck ball                  Figure 8s                  Circles right/left     2 reps each  2 reps each           Walking with 360 degree turns                  Stepping over half foam  Forward 10 reps bilateral  Lateral 10 reps bilateral Forward x 10  Lateral x 10            Weight shifting:    Left & Right                     Weight shifting: Forward & Backward                     Static Standing Balance                     Standing with feet apart                   Standing with feet together                     Standing with feet semitandem                  Standing with feet tandem                  Single leg stance                  X1/X2 Viewing exercises                     Hallpike-Jose Armando testing for BPPV (Benign Paroxysmal Positional Vertigo)                     Berry-Daroff exercises                  Canalith Repositioning treatment/Epley Maneuver  for BPPV (Benign Paroxysmal Positional Vertigo)                  Smart Equitest Training: See scanned report. Treatment/Session Summary:    Treatment Assessment:  Patient tolerated treatment well but with several sitting rest secondary to dizziness. Communication/Consultation:  None today  Equipment provided today:  Home exercise program  Recommendations/Intent for next treatment session: Next visit will focus on habituation exercises, balance exercises, and gait.     Total Treatment Billable Duration:  45 minutes  Time In: 1600  Time Out: 8124    ALICIA WESTBROOK, PT       Charge Capture  }Post Session Pain  PT Visit Info  [employer] Portal  MD Guidelines Scanned Media  Benefits  MyChart    Future Appointments   Date Time Provider Jamila Menjivar   8/2/2022  2:30 PM Ole Floor, [place] [facility]   8/4/2022  1:45 PM Ole Floor, [facility] SFE   8/17/2022 10:15 AM Edgar Chaves MD [facility]   9/29/2022  1:15 PM Yi Gandhi MD [facility]   10/17/2022 10:15 AM Jay Bryant MD [id number] [facility] AMB   11/25/2022  9:30 AM SFE US GE LOGIC E9 ROOM 2 [facility]ERUS SFE   12/7/2022  3:00 PM Davion [address] [place] [address]   12/7/2022  3:30 PM Sabas Wade MD [facility]   1/19/2023 10:15 AM Edgar Chaves MD [facility]

## 2022-08-02 ENCOUNTER — HOSPITAL ENCOUNTER (OUTPATIENT)
Dept: PHYSICAL THERAPY | Age: 87
Setting detail: RECURRING SERIES
Discharge: HOME | End: 2022-08-05
Payer: MEDICARE

## 2022-08-02 PROCEDURE — 97112 NEUROMUSCULAR REEDUCATION: CPT

## 2022-08-02 ASSESSMENT — PAIN SCALES - GENERAL: PAINLEVEL_OUTOF10: 0

## 2022-08-02 NOTE — PROGRESS NOTES
Magalis Shah  : 1933  Primary: Medicare Part A And B  Secondary: [address] [address]  Phone: [phone]  Fax: [phone] Plan Frequency: 1-2 times a week for 90 days    Plan of Care/Certification Expiration Date: 10/20/22      PT Visit Info: Total # of Visits to Date: 4      OUTPATIENT PHYSICAL THERAPY:OP NOTE TYPE: Treatment Note 2022       Episode  }Appt Desk             Treatment Diagnosis:    Difficulty in walking, Not elsewhere classified (R26.2)  Dizziness and Giddiness (R42)  Medical/Referring Diagnosis:  No admission diagnoses are documented for this encounter. Referring Physician:  David Slade MD MD Orders:  PT Eval and Treat   Date of Onset:  Onset Date:  (Chronic)     Allergies:   Iohexol, Atorvastatin, Ciprofloxacin, Codeine, Penicillins, and Valdecoxib  Restrictions/Precautions:  No data recordedNo data recorded   Interventions Planned (Treatment may consist of any combination of the following):    Current Treatment Recommendations: Balance training; Gait training; Vestibular rehab; Home exercise program     Subjective Comments:  Patient reports he is feeling better today than yesterday because his dizziness is better. Initial:}    0/10Post Session:       0/10  Medications Last Reviewed:  2022  Updated Objective Findings:  None Today  Treatment   NEUROMUSCULAR RE-EDUCATION: (45 minutes):    Exercise/activities per grid below to improve balance and dizziness . Required minimal verbal cues to promote dynamic balance in standing.   Balance/Vestibular Treatment:  Activity    Date  2022 Date  2022 Date  2022 Date Date Date   Activity/Exercise    Sets/reps/equipment Sets/reps/  equipment Sets/reps/  equipment Sets/reps/  equipment Sets/reps/  equipment Sets/reps/  equipment   Walking with head turns      4 laps  4 laps  4 laps         Walking with head up & down       4 laps   4 laps  4 laps         Step ups                   Step taps                   Marching    4 laps  4 laps  4 laps         Sidestepping    4 laps  4 laps  4 laps         Crossovers                  [place]                  Walking  backwards                    Tandem walking                  Weaving in/out of cones       4 laps   4 laps  4 laps         Picking up cones                  Stepping over half foam                Stubmaticck ball                  Figure 8s         2 reps         Circles right/left     2 reps each  2 reps each  2 reps each         Walking with 360 degree turns                  Stepping over half foam  Forward 10 reps bilateral  Lateral 10 reps bilateral Forward x 10  Lateral x 10   Forward x 10  Lateral x 10         Weight shifting:    Left & Right                     Weight shifting: Forward & Backward                     Static Standing Balance                     Standing with feet apart                   Standing with feet together                     Standing with feet semitandem                  Standing with feet tandem                  Single leg stance                  X1/X2 Viewing exercises                     Hallpike-[place] testing for BPPV (Benign Paroxysmal Positional Vertigo)                     Berry-Daroff exercises                  Canalith Repositioning treatment/Epley Maneuver  for BPPV (Benign Paroxysmal Positional Vertigo)                  Smart Equitest Training: See scanned report. Treatment/Session Summary:    Treatment Assessment:  Patient tolerated treatment well with several sitting rest breaks   Communication/Consultation:  None today  Equipment provided today:  Home exercise program  Recommendations/Intent for next treatment session: Next visit will focus on habituation exercises, balance exercises, and gait.     Total Treatment Billable Duration:  45 minutes  Time In: 1430  Time Out: OMAYRA Zuniga       Charge Capture  }Post Session Pain  PT Visit Info  [employer] Portal  MD Guidelines  Scanned Media  Benefits  MyChart    Future Appointments   Date Time Provider Jamila Menjivar   8/4/2022  1:45 PM Venkat Davis PT [facility]   8/17/2022 10:15 AM MD AMNA TaverasG [facility] AMB   9/29/2022  1:15 PM Makenna Hull MD [facility] AMB   10/17/2022 10:15 AM Bob Amaro MD [id number] [facility] AMB   11/25/2022  9:30 AM SFE US GE LOGIC E9 ROOM 2 [facility]US SFE   12/7/2022  3:00 PM Davion [address] [place] [address]   12/7/2022  3:30 PM Paty Plummer MD [facility]   1/19/2023 10:15 AM Adelso Fenton MD [facility] AM[facility]

## 2022-08-04 ENCOUNTER — HOSPITAL ENCOUNTER (OUTPATIENT)
Dept: PHYSICAL THERAPY | Age: 87
Setting detail: RECURRING SERIES
Discharge: HOME | End: 2022-08-07
Payer: MEDICARE

## 2022-08-04 PROCEDURE — 97112 NEUROMUSCULAR REEDUCATION: CPT

## 2022-08-04 ASSESSMENT — PAIN SCALES - GENERAL: PAINLEVEL_OUTOF10: 0

## 2022-08-04 NOTE — PROGRESS NOTES
Marcelo Menon  : 1933  Primary: Medicare Part A And B  Secondary: [address] @ [address] [address]  Phone: [phone]  Fax: [phone] Plan Frequency: 1-2 times a week for 90 days    Plan of Care/Certification Expiration Date: 10/20/22      PT Visit Info: Total # of Visits to Date: 5      OUTPATIENT PHYSICAL THERAPY:OP NOTE TYPE: Treatment Note 2022       Episode  }Appt Desk             Treatment Diagnosis:    Difficulty in walking, Not elsewhere classified (R26.2)  Dizziness and Giddiness (R42)  Medical/Referring Diagnosis:  No admission diagnoses are documented for this encounter. Referring Physician:  Daxa Wilcox MD MD Orders:  PT Eval and Treat   Date of Onset:  Onset Date:  (Chronic)     Allergies:   Iohexol, Atorvastatin, Ciprofloxacin, Codeine, Penicillins, and Valdecoxib  Restrictions/Precautions:  No data recordedNo data recorded   Interventions Planned (Treatment may consist of any combination of the following):    Current Treatment Recommendations: Balance training; Gait training; Vestibular rehab; Home exercise program     Subjective Comments:  Patient reports he is very dizzy today  Initial:}    0/10Post Session:       0/10  Medications Last Reviewed:  2022  Updated Objective Findings:  None Today  Treatment   NEUROMUSCULAR RE-EDUCATION: (45 minutes):    Exercise/activities per grid below to improve balance and dizziness . Required minimal verbal cues to promote dynamic balance in standing.   Balance/Vestibular Treatment:  Activity    Date  2022 Date  2022 Date  2022 Date  2022 Date Date   Activity/Exercise    Sets/reps/equipment Sets/reps/  equipment Sets/reps/  equipment Sets/reps/  equipment Sets/reps/  equipment Sets/reps/  equipment   Walking with head turns      4 laps  4 laps  4 laps  4 laps       Walking with head up & down       4 laps   4 laps  4 laps  4 laps       Step ups                   Step taps                   Marching    4 laps  4 laps  4 laps  4 laps       Sidestepping    4 laps  4 laps  4 laps  4 laps       Crossovers                  [place]                  Walking  backwards                    Tandem walking                  Weaving in/out of cones       4 laps   4 laps  4 laps  4 laps       Picking up cones                  Stepping over half foam                Ball toss                  Kick ball                  Figure 8s         2 reps  3 reps       Circles right/left     2 reps each  2 reps each  2 reps each  3 reps each       Walking with 360 degree turns                  Stepping over half foam  Forward 10 reps bilateral  Lateral 10 reps bilateral Forward x 10  Lateral x 10   Forward x 10  Lateral x 10  Forward x 10  Lateral x 10       Weight shifting:    Left & Right                     Weight shifting: Forward & Backward                     Static Standing Balance                     Standing with feet apart                   Standing with feet together                     Standing with feet semitandem                  Standing with feet tandem                  Single leg stance                  X1/X2 Viewing exercises                     Hallpike-[place] testing for BPPV (Benign Paroxysmal Positional Vertigo)                     Berry-Daroff exercises                  Canalith Repositioning treatment/Epley Maneuver  for BPPV (Benign Paroxysmal Positional Vertigo)                  Smart Equitest Training: See scanned report. Treatment/Session Summary:    Treatment Assessment:  Patient tolerated treatment with multiple rest breaks secondary to dizziness. Communication/Consultation:  None today  Equipment provided today:  Home exercise program  Recommendations/Intent for next treatment session: Next visit will focus on habituation exercises, balance exercises, and gait.     Total Treatment Billable Duration:  45 minutes  Time In: 1345  Time Out: David 65, PT       Charge Capture  }Post Session Pain  PT Visit Info  [employer] Portal  MD Guidelines  Scanned Media  Benefits  MyChart    Future Appointments   Date Time Provider Jamila Mansfieldi   8/10/2022  3:30 PM [facility] MRI ROOM [facility]   8/17/2022 10:15 AM José Miguel Treviño MD [facility] AMB   9/29/2022  1:15 PM Kanchan Haney MD [facility] AMB   10/17/2022 10:15 AM Fred Pandey MD [id number] [facility] AMB   11/25/2022  9:30 AM SFE US GE LOGIC E9 ROOM 2 [facility]US SFE   12/7/2022  3:00 PM Davion [address] [place] [address]   12/7/2022  3:30 PM Juan Miguel Worthington MD [facility]   1/19/2023 10:15 AM José Miguel Treviño MD [facility] AMB

## 2022-08-10 ENCOUNTER — HOSPITAL ENCOUNTER (OUTPATIENT)
Dept: MRI IMAGING | Age: 87
Discharge: HOME | End: 2022-08-13

## 2022-08-10 DIAGNOSIS — R42 DIZZINESS: ICD-10-CM

## 2022-09-29 ENCOUNTER — OFFICE VISIT (OUTPATIENT)
Dept: CARDIOLOGY CLINIC | Age: 87
End: 2022-09-29
Payer: MEDICARE

## 2022-09-29 VITALS
SYSTOLIC BLOOD PRESSURE: 132 MMHG | HEART RATE: 64 BPM | WEIGHT: 184.9 LBS | HEIGHT: 69 IN | DIASTOLIC BLOOD PRESSURE: 58 MMHG | BODY MASS INDEX: 27.39 KG/M2

## 2022-09-29 DIAGNOSIS — I25.10 CORONARY ARTERY DISEASE INVOLVING NATIVE CORONARY ARTERY OF NATIVE HEART WITHOUT ANGINA PECTORIS: Primary | ICD-10-CM

## 2022-09-29 DIAGNOSIS — I10 ESSENTIAL HYPERTENSION WITH GOAL BLOOD PRESSURE LESS THAN 130/85: ICD-10-CM

## 2022-09-29 PROCEDURE — G8417 CALC BMI ABV UP PARAM F/U: HCPCS | Performed by: INTERNAL MEDICINE

## 2022-09-29 PROCEDURE — 1036F TOBACCO NON-USER: CPT | Performed by: INTERNAL MEDICINE

## 2022-09-29 PROCEDURE — G8427 DOCREV CUR MEDS BY ELIG CLIN: HCPCS | Performed by: INTERNAL MEDICINE

## 2022-09-29 PROCEDURE — 99213 OFFICE O/P EST LOW 20 MIN: CPT | Performed by: INTERNAL MEDICINE

## 2022-09-29 PROCEDURE — 1123F ACP DISCUSS/DSCN MKR DOCD: CPT | Performed by: INTERNAL MEDICINE

## 2022-09-29 RX ORDER — LORAZEPAM 0.5 MG/1
TABLET ORAL
COMMUNITY
Start: 2022-09-15

## 2022-09-29 RX ORDER — FLUTICASONE PROPIONATE 50 MCG
SPRAY, SUSPENSION (ML) NASAL
COMMUNITY
Start: 2022-08-24

## 2022-09-29 ASSESSMENT — ENCOUNTER SYMPTOMS
ABDOMINAL PAIN: 0
ORTHOPNEA: 0
SPUTUM PRODUCTION: 0
COLOR CHANGE: 0
DIARRHEA: 0
HEMATOCHEZIA: 0
SHORTNESS OF BREATH: 0
BLURRED VISION: 0
BOWEL INCONTINENCE: 0
HOARSE VOICE: 0
WHEEZING: 0
HEMATEMESIS: 0
CHEST TIGHTNESS: 0

## 2022-09-29 NOTE — PROGRESS NOTES
[address], [facility] [address]  PHONE: [phone]        22        NAME:  Epifanio Machuca  : 1933  MRN: [id number]       SUBJECTIVE:   Epifanio Machuca is a 80 y.o. male seen for a follow up visit regarding the following: CAD and primary hypertension. He returns for follow up. [place] reports doing well except for chronic intermittent vertigo. He states that his wife has renal failure and has been placed under home hospice care. Chief Complaint   Patient presents with    Coronary Artery Disease    Hypertension     9 month follow up       HPI:    Coronary Artery Disease  Presents for follow-up visit. Pertinent negatives include no chest pain, chest tightness, dizziness, leg swelling, muscle weakness, palpitations, shortness of breath or weight gain. The symptoms have been stable. Compliance with diet is good. Compliance with exercise is variable. Compliance with medications is good. Hypertension  Pertinent negatives include no blurred vision, chest pain, malaise/fatigue, orthopnea, palpitations, PND or shortness of breath. Past Medical History, Past Surgical History, Family history, Social History, and Medications were all reviewed with the patient today and updated as necessary.          Current Outpatient Medications:     fluticasone (FLONASE) 50 MCG/ACT nasal spray, SPRAY 2 SPRAYS INTO EACH NOSTRIL EVERY DAY, Disp: , Rfl:     LORazepam (ATIVAN) 0.5 MG tablet, TAKE 1 TABLET (0.5 MG) BY MOUTH 2 (TWO) TIMES A DAY AS NEEDED (VERTIGO), Disp: , Rfl:     insulin aspart (NOVOLOG) 100 UNIT/ML injection pen, 14 units + 1:50 sliding scale with breakfast and lunch, 12 units +1:50 sliding scale with dinner, Disp: , Rfl:     Insulin Degludec (TRESIBA FLEXTOUCH) 100 UNIT/ML SOPN, Inject 34 Units into the skin every morning, Disp: , Rfl:     pantoprazole (PROTONIX) 20 MG tablet, TAKE 1 TABLET BY MOUTH EVERY DAY, Disp: , Rfl:     atorvastatin (LIPITOR) 20 MG tablet, Take 20 mg by mouth, Disp: , Rfl:     aspirin 81 MG EC tablet, Take 81 mg by mouth daily, Disp: , Rfl:     vitamin D 25 MCG (1000 UT) CAPS, Take 4,000 Units by mouth daily, Disp: , Rfl:     lipase-protease-amylase (CREON) 73597-53240 units delayed release capsule, Take 2 capsules by mouth 3 times daily (with meals), Disp: , Rfl:   Allergies   Allergen Reactions    Iohexol Anaphylaxis     Other reaction(s): Anaphylactic shock-Allergy    Atorvastatin Other (See Comments)     Pt not allergic. Currently taking  Other reaction(s): Myalgia-Intolerance, Unknown-Unspecified  Currently tolerating and currently taking    Ciprofloxacin Hives    Codeine Other (See Comments)     Makes patient hyperactive  Other reaction(s): Unknown-Unspecified  Makes patient hyperactive    Penicillins Rash     Other reaction(s): Rash-Allergy    Valdecoxib Hives and Rash     Vioxx     Past Medical History:   Diagnosis Date    Abnormal CT of liver 03/02/2021    New heterogeneous liver mass measuring up to 6.4 cm.  Further evaluation required    Anemia     GI esophageal ulcer per EGD- was given 2 units prbc's 3/12/21, eliquis has been stopped    AR (allergic rhinitis) 8/3/2016    Arthritis     Benign paroxysmal positional vertigo     Bilateral impacted cerumen     BPH (benign prostatic hyperplasia)     CAD (coronary artery disease)     2003 stent placed; MI in 2003     Chronic kidney disease     Stage III kidney disease elevated creatinine BUN    Chronic otitis media     Chronic pain     herniated disc in lower back; ESIs in recent past    Chronic pancreatitis ([facility]     Claustrophobia     Diverticulitis 8/3/2016    DNS (deviated nasal septum)     ETD (eustachian tube dysfunction)     GERD (gastroesophageal reflux disease)     controlled w/ prescription meds and OTC meds (pepcid)    H/O heart artery stent     S/P RCA stent by Dr. Meka Bassett in 2003    Heart disease 8/3/2016    Hepatocellular carcinoma ([facility]     High frequency hearing loss History of bilateral inguinal hernia repair     History of gastroesophageal reflux (GERD)     controlled w/ med; no breakthrough GERD    Hx of exercise stress test 02/22/2019    EF on Stress test 71%    Hypercholesterolemia     takes statin and fish oil    Hyperlipidemia     takes statin and fish oil    Hypertrophy of both inferior nasal turbinates     Kidney stone     Liver disease     going for MRI; recent CT showed mass on liver    Liver mass 4/16/2021    MHL (mixed hearing loss)     NO (nasal obstruction)     Otitis media, nonsuppurative     SNHL (sensorineural hearing loss) 8/3/2016    Squamous cell carcinoma, arm     left forearm near wrist; several other sites as well    Type 2 diabetes mellitus (HCC)     insulin reliant/AVG FBS: 100-120/s.s of hypoglycemia at 70/last A1c:6.6    Unspecified sleep apnea     no cpap; patient states he lost weight and does not have ASHLEY anymore    Vertigo      Past Surgical History:   Procedure Laterality Date    APPENDECTOMY      CATARACT REMOVAL Bilateral     COLONOSCOPY      CORONARY ANGIOPLASTY WITH STENT PLACEMENT  2003    stent RCA 2003    CYSTOSCOPY,INSERT URETERAL STENT Right 03/05/2021    ERCP  12/2015    ENDOSCOPIC RETROGRADE CHOLANGIOPANCREATOGRAPHY (N/A [place] [facility]) ENDOSCOPIC SPHINCTEROTOMY (N/A [facility]) ENDOSCOPIC STONE EXTRACTION/BALLOON SWEEP (N/A [facility])    HERNIA REPAIR Left 2014    LIH with mesh    HERNIA REPAIR Bilateral 1980s    MASTOIDECTOMY  1996    right modified canal wall down    ORTHOPEDIC SURGERY Right 1/14/03    Dupuytrens contracture release    OTHER SURGICAL HISTORY      skin lesion; local excision: SCC    TURP      TURP      TYMPANOSTOMY TUBE PLACEMENT Right     tube right ear    UPPER GASTROINTESTINAL ENDOSCOPY  03/11/2021    US GUIDED LIVER BIOPSY PERCUTANEOUS  4/16/2021    US GUIDED LIVER BIOPSY PERCUTANEOUS 4/16/2021 [facility]    VITRECTOMY       Family History   Problem Relation Age of Onset    Cancer Maternal Grandmother         colon    Heart Disease Maternal Grandfather       Social History     Tobacco Use    Smoking status: Never    Smokeless tobacco: Never   Substance Use Topics    Alcohol use: No       ROS:    Review of Systems   Constitutional: Negative for chills, decreased appetite, diaphoresis, fever, malaise/fatigue and weight gain. HENT:  Negative for congestion, hearing loss, hoarse voice and nosebleeds. Eyes:  Negative for blurred vision. Cardiovascular:  Negative for chest pain, claudication, cyanosis, dyspnea on exertion, irregular heartbeat, leg swelling, near-syncope, orthopnea, palpitations, paroxysmal nocturnal dyspnea and syncope. Respiratory:  Negative for chest tightness, shortness of breath, sputum production and wheezing. Endocrine: Negative for polydipsia, polyphagia and polyuria. Skin:  Negative for color change. Musculoskeletal:  Negative for muscle weakness. Gastrointestinal:  Negative for abdominal pain, bowel incontinence, diarrhea, hematemesis and hematochezia. Genitourinary:  Negative for dysuria, frequency and hematuria. Neurological:  Positive for vertigo. Negative for dizziness, focal weakness, light-headedness, loss of balance, numbness, sensory change and weakness. Psychiatric/Behavioral:  Negative for altered mental status and memory loss. PHYSICAL EXAM:   BP (!) 132/58   Pulse 64   Ht 5' 9\" (1.753 m)   Wt 184 lb 14.4 oz (83.9 kg)   BMI 27.30 kg/m²      Physical Exam  Constitutional:       Appearance: Normal appearance. HENT:      Head: Normocephalic and atraumatic. Nose: Nose normal.   Eyes:      Extraocular Movements: Extraocular movements intact. Pupils: Pupils are equal, round, and reactive to light. Neck:      Vascular: No carotid bruit. Cardiovascular:      Rate and Rhythm: Regular rhythm. Pulses: Normal pulses. Heart sounds: No murmur heard.   Pulmonary:      Effort: Pulmonary effort is normal.      Breath sounds: Normal breath sounds. Abdominal:      General: Abdomen is flat. Bowel sounds are normal.      Palpations: Abdomen is soft. Musculoskeletal:         General: Normal range of motion. Cervical back: Normal range of motion and neck supple. Skin:     General: Skin is warm and dry. Neurological:      General: No focal deficit present. Mental Status: He is alert and oriented to person, place, and time. Psychiatric:         Mood and Affect: Mood normal.       Medical problems and test results were reviewed with the patient today. No results found for this or any previous visit (from the past 672 hour(s)). ASSESSMENT and PLAN    Emma Bernabe was seen today for coronary artery disease and hypertension. Diagnoses and all orders for this visit:    Coronary artery disease involving native coronary artery of native heart without angina pectoris:Asymptomatic. Continue ASA and Lipitor    Essential hypertension with goal blood pressure less than 130/85:Stable on no anti hypertension medications        Disposition:    Return in about 6 months (around 3/29/2023). No follow-up provider specified.           Rhianna Tanner MD  9/29/2022  1:53 PM

## 2022-10-07 NOTE — THERAPY DISCHARGE
Marcelo Menon  : 1933  Primary: Medicare Part A And B  Secondary: [address] @ [address] [address]  Phone: [phone]  Fax: [phone] Plan Frequency: 1-2 times a week for 90 days  Plan of Care/Certification Expiration Date: 10/20/22      PT Visit Info: Total # of Visits to Date: 5      OUTPATIENT PHYSICAL THERAPY:OP NOTE TYPE: Discharge Summary                Episode  Appt Desk         Treatment Diagnosis:  Difficulty in walking, Not elsewhere classified (R26.2)  Dizziness and Giddiness (R42)* No diagnoses found *  Medical/Referring Diagnosis:  No admission diagnoses are documented for this encounter. Referring Physician:  Daxa Wilcox MD MD Orders:  PT Eval and Treat   Return MD Appt:  unknown  Date of Onset:  Onset Date:  (Chronic)     Allergies:  Iohexol, Atorvastatin, Ciprofloxacin, Codeine, Penicillins, and Valdecoxib  Restrictions/Precautions:    No data recordedNo data recorded   Medications Last Reviewed:  2022     SUBJECTIVE   History of Injury/Illness (Reason for Referral):  Patient reports having issues with vertigo for years, but vertigo has gotten progressively worse over the past three months. Patient rates current dizziness as 0/10 and pain level as 0/10. Patient has had no falls. Patient describes insidious onset of imbalance, vertigo, and nausea that can last for several hours. Patient can have 3-4 of these episodes a week. Patient is blind in right eye. Aggravating factors are quick head and body turns. Patient Stated Goal(s):   \"To decrease dizziness\"  Initial:     0/10 Post Session:     0/10  Past Medical History/Comorbidities:   Mr. Ines King  has a past medical history of Abnormal CT of liver, Anemia, AR (allergic rhinitis), Arthritis, Benign paroxysmal positional vertigo, Bilateral impacted cerumen, BPH (benign prostatic hyperplasia), CAD (coronary artery disease), Chronic kidney disease, Chronic otitis media, Chronic pain, Chronic pancreatitis ([address] Claustrophobia, Diverticulitis, DNS (deviated nasal septum), ETD (eustachian tube dysfunction), GERD (gastroesophageal reflux disease), H/O heart artery stent, Heart disease, Hepatocellular carcinoma ([address] High frequency hearing loss, History of bilateral inguinal hernia repair, History of gastroesophageal reflux (GERD), Hx of exercise stress test, Hypercholesterolemia, Hyperlipidemia, Hypertrophy of both inferior nasal turbinates, Kidney stone, Liver disease, Liver mass, MHL (mixed hearing loss), NO (nasal obstruction), Otitis media, nonsuppurative, SNHL (sensorineural hearing loss), Squamous cell carcinoma, arm, Type 2 diabetes mellitus ([address] Unspecified sleep apnea, and Vertigo. Mr. Josselyn Selby  has a past surgical history that includes cystoscopy,insert ureteral stent (Right, 03/05/2021); ERCP (12/2015); hernia repair (Left, 2014); orthopedic surgery (Right, 1/14/03); TURP; Appendectomy; hernia repair (Bilateral, 1980s); other surgical history; mastoidectomy (1996); vitrectomy; Cataract removal (Bilateral); Colonoscopy; TURP; Coronary angioplasty with stent (2003); Tympanostomy tube placement (Right); Upper gastrointestinal endoscopy (03/11/2021); and US BIOPSY LIVER PERCUTANEOUS (4/16/2021). Social History/Living Environment:   Lives With: Spouse  Type of Home: House  Home Layout: One level     Prior Level of Function/Work/Activity:   Prior level of function: Independent  Occupation: Retired  No data [id number] recorded   Learning:   Does the patient/guardian have any barriers to learning?: No barriers  Will there be a co-learner?: No  What is the preferred language of the patient/guardian?: English  Is an  required?: No  How does the patient/guardian prefer to learn new concepts?: Listening; Reading; Demonstration     Fall Risk Scale:    Coreen Total Score: 25  Angela Fall Risk: Medium (25-44) Dominant Side:  right handed  Personal Factors:        Sex:  male        Age:  80 y.o. OBJECTIVE   Balance / Vestibular:   Visual/Oculomotor:  Visual/Occulomotor Assessed: Yes  Spontaneous Nystagmus: Intact  Gaze Holding Nystagmus: No  Eye Movement Range: Normal  Smooth pursuits horizontal: Able to track stimulus in all quadrants without difficulty  Smooth pursuits vertical: Able to track stimulus in all quadrants without difficulty  Saccades horizontal:  Within Defined Limits  Saccades vertical: Within Defined Limits  Balance Outcome Measures:  Zamorano Balance Scale:  44 /56  (A score less than 45/56 indicates high risk of falls)  Dizziness Handicap Inventory:   56    Scores:  Scores greater than 10 points should be referred to balance specialists for further evaluation. 16-34 Points (mild handicap)  36-52 Points (moderate handicap)  54+ Points (severe handicap)  Dynamic Gait Index:  15 /24  (A score less than or equal to19/24 is abnormal and predictive of falls)   ASSESSMENT   Initial Assessment:  Patient presents with dizziness, imbalance, and difficulty walking. Patient is at higher fall risk based on scores received on Zamorano Balance Scale,  Dizziness Handicap Inventory, and Dynamic Gait Index. Patient would benefit from skilled physical therapy to address problems and goals. Thank you for this referral.   Discharge note:  Patient attended five scheduled physical therapy appointments from 7/22/2022 to 8/4/2022. Patient did not schedule additional appointments. Problems and goals unable to be reassessed. Patient discharged from physical therapy. Thank you for this referral.    Problem List: (Impacting functional limitations): Body Structures, Functions, Activity Limitations Requiring Skilled Therapeutic Intervention: Decreased functional mobility ;  Decreased balance; Vestibular Impairment     Therapy Prognosis:   Therapy Prognosis: Good     Assessment Complexity:   Medium Complexity  PLAN Effective Dates: 7/22/022 TO Plan of Care/Certification Expiration Date: 10/20/22     Frequency/Duration: Plan Frequency: 1-2 times a week for 90 days     Interventions Planned (Treatment may consist of any combination of the following):    Current Treatment Recommendations: Balance training; Gait training; Vestibular rehab; Home exercise program     Goals: (Goals have been discussed and agreed upon with patient.)  Short-Term Functional Goals: Time Frame: 30 days   Patient will be independent with home exercise program to improve balance and decrease dizziness. Goal unable to be reassessed. Discharge Goals: Time Frame: 90 days   Patient will increase score on Dynamic Gait Index greater than or equal to 20/24 demonstrating improved balance and decreased fall risk with daily activities. Goal unable to be reassessed. Patient will increase score on Zamorano Balance Scale greater than or equal to 49/56 demonstrating improved balance and decreased fall risk with daily activities. Goal unable to be reassessed. Patient will decrease score on Dizziness Handicap Inventory greater than or equal to 35 demonstrating improvement. Patient will report improved stability with activities of daily living. Goal unable to be reassessed. Outcome Measure: Tool Used: Zamorano Balance Scale  Score:  Initial:   44/56 Most Recent: X/56 (Date: -- )   Interpretation of Score: Each section is scored on a 0-4 scale, 0 representing the patients inability to perform the task and 4 representing independence. The scores of each section are added together for a total score of 56. The higher the patients score, the more independent the patient is. Any score below 45 indicates increased risk for falls.     Tool Used: Dynamic Gait Index  Score:  Initial: 15/24 Most Recent: X/24 (Date: -- )   Interpretation of Score: Each section is scored on a 0-3 scale, 0 representing the patients inability to perform the task and 3 representing independence. The scores of each section are added together for a total score of 24. Any score below 19 indicates increased risk for falls. Tool Used: Dizziness Handicap Index  Score:  Initial: 56  Most Recent:  (Date: -- )   Interpretation of Score: To each item, the following scores may be assigned: No = 0, Sometimes = 2, Yes = 4. Scores greater than 10 points should be referred to balance specialists for further evaluation.             Post Session Pain  Charge Capture  PT Visit Info  POC Link  Treatment Note Link  MD Guidelines  [place]

## 2022-10-16 ASSESSMENT — ENCOUNTER SYMPTOMS
BLOOD IN STOOL: 0
EYE DISCHARGE: 0
HEARTBURN: 0
INDIGESTION: 0
SKIN LESIONS: 0
NAUSEA: 0
SHORTNESS OF BREATH: 0
DIARRHEA: 0
EYE PAIN: 0
BACK PAIN: 0
CONSTIPATION: 0
WHEEZING: 0
VOMITING: 0
COUGH: 0
ABDOMINAL PAIN: 0

## 2022-10-16 NOTE — PROGRESS NOTES
[facility]  [address]   [address]  [facility], [address] [facility]  [phone]    Salina Jung  : 1933    CC: BPH/Kidney Stones       HPI     Salina Jung is a 80 y.o.  male with a PMH of BPH/LUTS and kidney stones s/p TURP and R URS/LL on 3/2021 who returns for follow up     Seen 10/2021 by me. Path negative for cancer. He is now off all prostate/bladder meds but was on flomax in the past.  No recent issues with stones / flank pain. Does report starting/stopping/dribbling. Wants to discuss restarting flomax today. PVR 0 cc at last visit. KUB today shows no stones. Past Medical History:   Diagnosis Date    Abnormal CT of liver 2021    New heterogeneous liver mass measuring up to 6.4 cm.  Further evaluation required    Anemia     GI esophageal ulcer per EGD- was given 2 units prbc's 3/12/21, eliquis has been stopped    AR (allergic rhinitis) 8/3/2016    Arthritis     Benign paroxysmal positional vertigo     Bilateral impacted cerumen     BPH (benign prostatic hyperplasia)     CAD (coronary artery disease)     2003 stent placed; MI in      Chronic kidney disease     Stage III kidney disease elevated creatinine BUN    Chronic otitis media     Chronic pain     herniated disc in lower back; ESIs in recent past    Chronic pancreatitis ([address]     Claustrophobia     Diverticulitis 8/3/2016    DNS (deviated nasal septum)     ETD (eustachian tube dysfunction)     GERD (gastroesophageal reflux disease)     controlled w/ prescription meds and OTC meds (pepcid)    H/O heart artery stent     S/P RCA stent by Dr. Katey Blake in     Heart disease 8/3/2016    Hepatocellular carcinoma ([address]     High frequency hearing loss     History of bilateral inguinal hernia repair     History of gastroesophageal reflux (GERD)     controlled w/ med; no breakthrough GERD    Hx of exercise stress test 2019    EF on Stress test 71%    Hypercholesterolemia     takes statin and fish oil    Hyperlipidemia     takes statin and fish oil    Hypertrophy of both inferior nasal turbinates     Kidney stone     Liver disease     going for MRI; recent CT showed mass on liver    Liver mass 4/16/2021    MHL (mixed hearing loss)     NO (nasal obstruction)     Otitis media, nonsuppurative     SNHL (sensorineural hearing loss) 8/3/2016    Squamous cell carcinoma, arm     left forearm near wrist; several other sites as well    Type 2 diabetes mellitus (HCC)     insulin reliant/AVG FBS: 100-120/s.s of hypoglycemia at 70/last A1c:6.6    Unspecified sleep apnea     no cpap; patient states he lost weight and does not have ASHLEY anymore    Vertigo      Past Surgical History:   Procedure Laterality Date    APPENDECTOMY      CATARACT REMOVAL Bilateral     COLONOSCOPY      CORONARY ANGIOPLASTY WITH STENT PLACEMENT  2003    stent RCA 2003    CYSTOSCOPY,INSERT URETERAL STENT Right 03/05/2021    ERCP  12/2015    ENDOSCOPIC RETROGRADE CHOLANGIOPANCREATOGRAPHY (N/A [place]) ENDOSCOPIC SPHINCTEROTOMY (N/A [place]) ENDOSCOPIC STONE EXTRACTION/BALLOON SWEEP (N/A [place])    HERNIA REPAIR Left 2014    LIH with mesh    HERNIA REPAIR Bilateral 1980s    MASTOIDECTOMY  1996    right modified canal wall down    ORTHOPEDIC SURGERY Right 1/14/03    Dupuytrens contracture release    OTHER SURGICAL HISTORY      skin lesion; local excision: SCC    TURP      TURP      TYMPANOSTOMY TUBE PLACEMENT Right     tube right ear    UPPER GASTROINTESTINAL ENDOSCOPY  03/11/2021    US GUIDED LIVER BIOPSY PERCUTANEOUS  4/16/2021    US GUIDED LIVER BIOPSY PERCUTANEOUS 4/16/2021 [facility]    VITRECTOMY       Current Outpatient Medications   Medication Sig Dispense Refill    tamsulosin (FLOMAX) 0.4 MG capsule Take 1 capsule by mouth daily 90 capsule 3    LORazepam (ATIVAN) 0.5 MG tablet TAKE 1 TABLET (0.5 MG) BY MOUTH 2 (TWO) TIMES A DAY AS NEEDED (VERTIGO)      insulin aspart (NOVOLOG) 100 UNIT/ML injection pen 14 units + 1:50 sliding scale with breakfast and lunch, 12 units +1:50 sliding scale with dinner      Insulin Degludec (TRESIBA FLEXTOUCH) 100 UNIT/ML SOPN Inject 34 Units into the skin every morning      pantoprazole (PROTONIX) 20 MG tablet TAKE 1 TABLET BY MOUTH EVERY DAY      atorvastatin (LIPITOR) 20 MG tablet Take 20 mg by mouth      aspirin 81 MG EC tablet Take 81 mg by mouth daily      vitamin D 25 MCG (1000 UT) CAPS Take 4,000 Units by mouth daily      lipase-protease-amylase (CREON) 34087-56873 units delayed release capsule Take 2 capsules by mouth 3 times daily (with meals)      fluticasone (FLONASE) 50 MCG/ACT nasal spray SPRAY 2 SPRAYS INTO EACH NOSTRIL EVERY DAY (Patient not taking: Reported on 10/17/2022)       No current facility-administered medications for this visit. Allergies   Allergen Reactions    Iohexol Anaphylaxis     Other reaction(s): Anaphylactic shock-Allergy    Atorvastatin Other (See Comments)     Pt not allergic.  Currently taking  Other reaction(s): Myalgia-Intolerance, Unknown-Unspecified  Currently tolerating and currently taking    Ciprofloxacin Hives    Codeine Other (See Comments)     Makes patient hyperactive  Other reaction(s): Unknown-Unspecified  Makes patient hyperactive    Penicillins Rash     Other reaction(s): Rash-Allergy    Valdecoxib Hives and Rash     Vioxx     Social History     Socioeconomic History    Marital status:      Spouse name: Not on file    Number of children: Not on file    Years of education: Not on file    Highest education level: Not on file   Occupational History    Not on file   Tobacco Use    Smoking status: Never    Smokeless tobacco: Never   Substance and Sexual Activity    Alcohol use: No    Drug use: No    Sexual activity: Not on file   Other Topics Concern    Not on file   Social History Narrative    Not on file     Social Determinants of Health     Financial Resource Strain: Not on file   Food Insecurity: Not on file   Transportation Needs: Not on file   Physical Activity: Not on file   Stress: Not on file   Social Connections: Not on file   Intimate Partner Violence: Not on file   Housing Stability: Not on file     Family History   Problem Relation Age of Onset    Cancer Maternal Grandmother         colon    Heart Disease Maternal Grandfather        Review of Systems  Constitutional:   Negative for fever, chills, appetite change, malaise/fatigue, headaches and weight loss. Skin:  Negative for skin lesions, rash and itching. Eyes:  Negative for visual disturbance, eye pain and eye discharge. ENT:  Negative for difficulty articulating words, pain swallowing, high frequency hearing loss and dry mouth. Respiratory:  Negative for cough, blood in sputum, shortness of breath and wheezing. Cardiovascular:  Negative for chest pain, hypertension, irregular heartbeat, leg pain, leg swelling, regular rate and rhythm and varicose veins. GI:  Negative for nausea, vomiting, abdominal pain, blood in stool, constipation, diarrhea, indigestion and heartburn. Genitourinary:  Negative for urinary burning, hematuria, flank pain, recurrent UTIs, history of urolithiasis, nocturia, slower stream, straining, urgency, leakage w/ urge, frequent urination, incomplete emptying, erectile dysfunction, testicular pain, sexually transmitted disease, discharge and urethral stricture. Musculoskeletal:  Negative for back pain, bone pain, arthralgias, tenderness, muscle weakness and neck pain. Neurological:  Negative for dizziness, focal weakness, numbness, seizures and tremors. Psychological:  Negative for depression and psychiatric problem. Endocrine:  Negative for cold intolerance, thirst, excessive urination, fatigue and heat intolerance. Hem/Lymphatic:  Negative for easy bleeding, easy bruising and frequent infections. There were no vitals taken for this visit.      GENERAL: No acute distress, Awake, Alert, Oriented X 3, Gait normal  CARDIAC: regular rate and rhythm  CHEST AND LUNG: Easy work of breathing, clear to auscultation bilaterally, no cyanosis  ABDOMEN: soft, non tender, non-distended, positive bowel sounds, no organomegaly, no palpable masses, no guarding, no rebound tenderness  SKIN: No rash, no erythema, no lacerations or abrasions, no ecchymosis  NEUROLOGIC: cranial nerves 2-12 grossly intact     Assessment and Plan    ICD-10-CM    1. Benign prostatic hyperplasia with urinary frequency  N40.1 AMB POC URINALYSIS DIP STICK AUTO W/O MICRO    R35.0 tamsulosin (FLOMAX) 0.4 MG capsule      2. Calculus of kidney  N20.0 AMB POC URINALYSIS DIP STICK AUTO W/O MICRO     AMB POC XRAY ABDOMEN 1 VIEW        BPH/LUTS:   LUTS worse this year. Discussed today. Will restart flomax to see if this helps. Script sent today. Follow up 1 year or sooner if needed. Kidney Stones:   KUB today shows no new stones. Doing well. I have spent 30 minutes today reviewing previous notes, test results and face to face with the patient as well as documenting. Pola Quiñonez M.D.     [facility]  [address], [address] [facility]  Phone: [phone]  Fax: [phone]

## 2022-10-17 ENCOUNTER — OFFICE VISIT (OUTPATIENT)
Dept: UROLOGY | Age: 87
End: 2022-10-17
Payer: MEDICARE

## 2022-10-17 DIAGNOSIS — N20.0 CALCULUS OF KIDNEY: ICD-10-CM

## 2022-10-17 DIAGNOSIS — R35.0 BENIGN PROSTATIC HYPERPLASIA WITH URINARY FREQUENCY: Primary | ICD-10-CM

## 2022-10-17 DIAGNOSIS — N40.1 BENIGN PROSTATIC HYPERPLASIA WITH URINARY FREQUENCY: Primary | ICD-10-CM

## 2022-10-17 LAB
BILIRUBIN, URINE, POC: NEGATIVE
BLOOD URINE, POC: NORMAL
GLUCOSE URINE, POC: NEGATIVE
KETONES, URINE, POC: NEGATIVE
LEUKOCYTE ESTERASE, URINE, POC: NEGATIVE
NITRITE, URINE, POC: NEGATIVE
PH, URINE, POC: 6 (ref 4.6–8)
PROTEIN,URINE, POC: 30
SPECIFIC GRAVITY, URINE, POC: 1.02 (ref 1–1.03)
URINALYSIS CLARITY, POC: NORMAL
URINALYSIS COLOR, POC: NORMAL
UROBILINOGEN, POC: NORMAL

## 2022-10-17 PROCEDURE — 1123F ACP DISCUSS/DSCN MKR DOCD: CPT | Performed by: UROLOGY

## 2022-10-17 PROCEDURE — G8484 FLU IMMUNIZE NO ADMIN: HCPCS | Performed by: UROLOGY

## 2022-10-17 PROCEDURE — 81003 URINALYSIS AUTO W/O SCOPE: CPT | Performed by: UROLOGY

## 2022-10-17 PROCEDURE — G8427 DOCREV CUR MEDS BY ELIG CLIN: HCPCS | Performed by: UROLOGY

## 2022-10-17 PROCEDURE — 1036F TOBACCO NON-USER: CPT | Performed by: UROLOGY

## 2022-10-17 PROCEDURE — G8417 CALC BMI ABV UP PARAM F/U: HCPCS | Performed by: UROLOGY

## 2022-10-17 PROCEDURE — 99214 OFFICE O/P EST MOD 30 MIN: CPT | Performed by: UROLOGY

## 2022-10-17 RX ORDER — TAMSULOSIN HYDROCHLORIDE 0.4 MG/1
0.4 CAPSULE ORAL DAILY
Qty: 90 CAPSULE | Refills: 3 | Status: SHIPPED | OUTPATIENT
Start: 2022-10-17

## 2022-11-25 ENCOUNTER — HOSPITAL ENCOUNTER (OUTPATIENT)
Dept: ULTRASOUND IMAGING | Age: 87
Discharge: HOME | End: 2022-11-28
Payer: MEDICARE

## 2022-11-25 DIAGNOSIS — Z85.05 HISTORY OF HEPATOCELLULAR CARCINOMA: ICD-10-CM

## 2022-11-25 PROCEDURE — 76700 US EXAM ABDOM COMPLETE: CPT

## 2022-12-07 ENCOUNTER — OFFICE VISIT (OUTPATIENT)
Dept: ONCOLOGY | Age: 87
End: 2022-12-07
Payer: MEDICARE

## 2022-12-07 ENCOUNTER — HOSPITAL ENCOUNTER (OUTPATIENT)
Dept: LAB | Age: 87
Discharge: HOME | End: 2022-12-10
Payer: MEDICARE

## 2022-12-07 VITALS
OXYGEN SATURATION: 97 % | WEIGHT: 181.7 LBS | HEIGHT: 69 IN | HEART RATE: 58 BPM | DIASTOLIC BLOOD PRESSURE: 65 MMHG | SYSTOLIC BLOOD PRESSURE: 142 MMHG | RESPIRATION RATE: 18 BRPM | TEMPERATURE: 97.9 F | BODY MASS INDEX: 26.91 KG/M2

## 2022-12-07 DIAGNOSIS — R53.83 OTHER FATIGUE: ICD-10-CM

## 2022-12-07 DIAGNOSIS — Z85.05 HISTORY OF HEPATOCELLULAR CARCINOMA: ICD-10-CM

## 2022-12-07 DIAGNOSIS — Z85.05 HISTORY OF HEPATOCELLULAR CARCINOMA: Primary | ICD-10-CM

## 2022-12-07 LAB
AFP-TM SERPL-MCNC: 6.5 NG/ML
ALBUMIN SERPL-MCNC: 3.7 G/DL (ref 3.2–4.6)
ALBUMIN/GLOB SERPL: 1.1 {RATIO} (ref 0.4–1.6)
ALP SERPL-CCNC: 69 U/L (ref 50–136)
ALT SERPL-CCNC: 37 U/L (ref 12–65)
ANION GAP SERPL CALC-SCNC: 3 MMOL/L (ref 2–11)
AST SERPL-CCNC: 26 U/L (ref 15–37)
BASOPHILS # BLD: 0.1 K/UL (ref 0–0.2)
BASOPHILS NFR BLD: 1 % (ref 0–2)
BILIRUB SERPL-MCNC: 0.6 MG/DL (ref 0.2–1.1)
BUN SERPL-MCNC: 26 MG/DL (ref 8–23)
CALCIUM SERPL-MCNC: 9 MG/DL (ref 8.3–10.4)
CHLORIDE SERPL-SCNC: 109 MMOL/L (ref 101–110)
CO2 SERPL-SCNC: 27 MMOL/L (ref 21–32)
CREAT SERPL-MCNC: 1.9 MG/DL (ref 0.8–1.5)
DIFFERENTIAL METHOD BLD: ABNORMAL
EOSINOPHIL # BLD: 0.3 K/UL (ref 0–0.8)
EOSINOPHIL NFR BLD: 5 % (ref 0.5–7.8)
ERYTHROCYTE [DISTWIDTH] IN BLOOD BY AUTOMATED COUNT: 14.1 % (ref 11.9–14.6)
GLOBULIN SER CALC-MCNC: 3.4 G/DL (ref 2.8–4.5)
GLUCOSE SERPL-MCNC: 253 MG/DL (ref 65–100)
HCT VFR BLD AUTO: 45 %
HGB BLD-MCNC: 14.1 G/DL (ref 13.6–17.2)
IMM GRANULOCYTES # BLD AUTO: 0 K/UL (ref 0–0.5)
IMM GRANULOCYTES NFR BLD AUTO: 0 % (ref 0–5)
LYMPHOCYTES # BLD: 1 K/UL (ref 0.5–4.6)
LYMPHOCYTES NFR BLD: 16 % (ref 13–44)
MCH RBC QN AUTO: 27.9 PG (ref 26.1–32.9)
MCHC RBC AUTO-ENTMCNC: 31.3 G/DL (ref 31.4–35)
MCV RBC AUTO: 88.9 FL (ref 82–102)
MONOCYTES # BLD: 0.4 K/UL (ref 0.1–1.3)
MONOCYTES NFR BLD: 7 % (ref 4–12)
NEUTS SEG # BLD: 4.3 K/UL (ref 1.7–8.2)
NEUTS SEG NFR BLD: 71 % (ref 43–78)
NRBC # BLD: 0 K/UL (ref 0–0.2)
PLATELET # BLD AUTO: 180 K/UL (ref 150–450)
PMV BLD AUTO: 10 FL (ref 9.4–12.3)
POTASSIUM SERPL-SCNC: 4.3 MMOL/L (ref 3.5–5.1)
PROT SERPL-MCNC: 7.1 G/DL (ref 6.3–8.2)
RBC # BLD AUTO: 5.06 M/UL (ref 4.23–5.6)
SODIUM SERPL-SCNC: 139 MMOL/L (ref 133–143)
WBC # BLD AUTO: 6 K/UL (ref 4.3–11.1)

## 2022-12-07 PROCEDURE — 80053 COMPREHEN METABOLIC PANEL: CPT

## 2022-12-07 PROCEDURE — 82105 ALPHA-FETOPROTEIN SERUM: CPT

## 2022-12-07 PROCEDURE — G8427 DOCREV CUR MEDS BY ELIG CLIN: HCPCS | Performed by: INTERNAL MEDICINE

## 2022-12-07 PROCEDURE — 99213 OFFICE O/P EST LOW 20 MIN: CPT | Performed by: INTERNAL MEDICINE

## 2022-12-07 PROCEDURE — 1036F TOBACCO NON-USER: CPT | Performed by: INTERNAL MEDICINE

## 2022-12-07 PROCEDURE — G8484 FLU IMMUNIZE NO ADMIN: HCPCS | Performed by: INTERNAL MEDICINE

## 2022-12-07 PROCEDURE — 85025 COMPLETE CBC W/AUTO DIFF WBC: CPT

## 2022-12-07 PROCEDURE — 36415 COLL VENOUS BLD VENIPUNCTURE: CPT

## 2022-12-07 PROCEDURE — 1123F ACP DISCUSS/DSCN MKR DOCD: CPT | Performed by: INTERNAL MEDICINE

## 2022-12-07 PROCEDURE — G8417 CALC BMI ABV UP PARAM F/U: HCPCS | Performed by: INTERNAL MEDICINE

## 2022-12-07 ASSESSMENT — PATIENT HEALTH QUESTIONNAIRE - PHQ9
SUM OF ALL RESPONSES TO PHQ QUESTIONS 1-9: 2
1. LITTLE INTEREST OR PLEASURE IN DOING THINGS: 1
2. FEELING DOWN, DEPRESSED OR HOPELESS: 1
SUM OF ALL RESPONSES TO PHQ9 QUESTIONS 1 & 2: 2
SUM OF ALL RESPONSES TO PHQ QUESTIONS 1-9: 2

## 2022-12-07 NOTE — PATIENT INSTRUCTIONS
Patient Instructions from Today's Visit    Reason for Visit:  [address] follos up    Plan:  AFP pending and will call if needed, repeat ultrasound and labs in 6 months and return to clinic, call as needed  The Ultrasound reveals no evidence of hepatic mass.     Follow Up:  Office visit in 6 months    Recent Lab Results:  Hospital Outpatient Visit on 12/07/2022   Component Date Value Ref Range Status    WBC 12/07/2022 6.0  4.3 - 11.1 K/uL Final    RBC 12/07/2022 5.06  4.23 - 5.6 M/uL Final    Hemoglobin 12/07/2022 14.1  13.6 - 17.2 g/dL Final    Hematocrit 12/07/2022 45.0  % Final    MCV 12/07/2022 88.9  82.0 - 102.0 FL Final    MCH 12/07/2022 27.9  26.1 - 32.9 PG Final    MCHC 12/07/2022 31.3 (A)  31.4 - 35.0 g/dL Final    RDW 12/07/2022 14.1  11.9 - 14.6 % Final    Platelets 97/56/6840 180  150 - 450 K/uL Final    MPV 12/07/2022 10.0  9.4 - 12.3 FL Final    nRBC 12/07/2022 0.00  0.0 - 0.2 K/uL Final    **Note: Absolute NRBC parameter is now reported with Hemogram**    Differential Type 12/07/2022 AUTOMATED    Final    Seg Neutrophils 12/07/2022 71  43 - 78 % Final    Lymphocytes 12/07/2022 16  13 - 44 % Final    Monocytes 12/07/2022 7  4.0 - 12.0 % Final    Eosinophils % 12/07/2022 5  0.5 - 7.8 % Final    Basophils 12/07/2022 1  0.0 - 2.0 % Final    Immature Granulocytes 12/07/2022 0  0.0 - 5.0 % Final    Segs Absolute 12/07/2022 4.3  1.7 - 8.2 K/UL Final    Absolute Lymph # 12/07/2022 1.0  0.5 - 4.6 K/UL Final    Absolute Mono # 12/07/2022 0.4  0.1 - 1.3 K/UL Final    Absolute Eos # 12/07/2022 0.3  0.0 - 0.8 K/UL Final    Basophils Absolute 12/07/2022 0.1  0.0 - 0.2 K/UL Final    Absolute Immature Granulocyte 12/07/2022 0.0  0.0 - 0.5 K/UL Final     Treatment Summary has been discussed and given to patient: n/a    -------------------------------------------------------------------------------------------------------------------  Please call our office at [phone] if you have any  of the following symptoms:   Fever of 100.5 or greater  Chills  Shortness of breath  Swelling or pain in one leg    After office hours an answering service is available and will contact a provider for emergencies or if you are experiencing any of the above symptoms. Patient does express an interest in My Chart. My Chart log in information explained on the after visit summary printout at the [facility] [address] desk.     Monique Nassar RN

## 2022-12-07 NOTE — PROGRESS NOTES
[facility] Hematology & Oncology: Office Visit Progress Note      Chief Complaint:     Liver mass      History of Present Illness:   80 y.o. M admitted on 3/5/21 with a right ureteral stone. He has a PMH of BPH, CAD, BPH, diverticulitis, and previous  TURP 20 years ago. He presented to the ER on 3/2 with severe right flank pain. He had a CT urogram on 3/2 which showed an obstructing 4 mm calculus in the proximal right ureter resulting in mild right hydroureteronephrosis with inflammatory stranding,  new heterogenous liver mass up to 6.4 cm, chronic pancreatitis, and prostatomegaly with urinary bladder wall thickening. He had a cytoscopy on 3/5 and a right ureter stent placed with cooper insertion over wire. He was noticed to have an increase in Cr (2x BL) at 3.25, now improved today to 2.63. We were consulted given the new liver mass for our recommendations. He received segment 6 liver resection with Dr. Juan Jose Miller on 5/12/2021: Interim history update in A/P. Review of Systems:      Constitutional   fatigue. Denies fever or chills. Denies weight loss or appetite changes. Denies anorexia. HEENT  Denies trauma, bluring vision, ear pain, nosebleeds, sore throat, neck pain and ear discharge. Difficulty hearing      Skin  Denies lesions or rashes. Hx of skin cancer     Lungs  Denies shortness of breath, cough, sputum production or hemoptysis. Cardiovascular  Denies chest pain, palpitations, orthopnea, claudication and leg swelling. Gastrointestinal  Denies nausea, vomiting, bowel changes. Denies bloody or black stools. Denies abdominal pain.   Denies dysuria, frequency or hesitancy of urination   Waking to urinate 2x/night      Neuro  Denies visual changes or ataxia. Denies dizziness, tingling, tremors, sensory change, speech change, focal weakness and headaches.      Hematology  Denies nasal/gum bleeding, denies easy bruise     Endo  Denies heat/cold intolerance   Diabetes      MSK  Denies back pain, swollen legs, myalgias and falls. Psychiatric/Behavioral  Denies depression and substance abuse. The patient is not nervous/anxious. Allergies   Allergen Reactions    Iohexol Anaphylaxis     Other reaction(s): Anaphylactic shock-Allergy    Atorvastatin Other (See Comments)     Pt not allergic. Currently taking  Other reaction(s): Myalgia-Intolerance, Unknown-Unspecified  Currently tolerating and currently taking    Ciprofloxacin Hives    Codeine Other (See Comments)     Makes patient hyperactive  Other reaction(s): Unknown-Unspecified  Makes patient hyperactive    Penicillins Rash     Other reaction(s): Rash-Allergy    Valdecoxib Hives and Rash     Vioxx     Past Medical History:   Diagnosis Date    Abnormal CT of liver 03/02/2021    New heterogeneous liver mass measuring up to 6.4 cm.  Further evaluation required    Anemia     GI esophageal ulcer per EGD- was given 2 units prbc's 3/12/21, eliquis has been stopped    AR (allergic rhinitis) 8/3/2016    Arthritis     Benign paroxysmal positional vertigo     Bilateral impacted cerumen     BPH (benign prostatic hyperplasia)     CAD (coronary artery disease)     2003 stent placed; MI in 2003     Chronic kidney disease     Stage III kidney disease elevated creatinine BUN    Chronic otitis media     Chronic pain     herniated disc in lower back; ESIs in recent past    Chronic pancreatitis ([facility] [address]     Claustrophobia     Diverticulitis 8/3/2016    DNS (deviated nasal septum)     ETD (eustachian tube dysfunction)     GERD (gastroesophageal reflux disease)     controlled w/ prescription meds and OTC meds (pepcid)    H/O heart artery stent     S/P RCA stent by Dr. Ashish Hoffman in 2003    Heart disease 8/3/2016    Hepatocellular carcinoma ([facility] [address]     High frequency hearing loss     History of bilateral inguinal hernia repair     History of gastroesophageal reflux (GERD)     controlled w/ med; no breakthrough GERD    Hx of exercise stress test 02/22/2019    EF on Stress test 71%    Hypercholesterolemia     takes statin and fish oil    Hyperlipidemia     takes statin and fish oil    Hypertrophy of both inferior nasal turbinates     Kidney stone     Liver disease     going for MRI; recent CT showed mass on liver    Liver mass 4/16/2021    MHL (mixed hearing loss)     NO (nasal obstruction)     Otitis media, nonsuppurative     SNHL (sensorineural hearing loss) 8/3/2016    Squamous cell carcinoma, arm     left forearm near wrist; several other sites as well    Type 2 diabetes mellitus (HCC)     insulin reliant/AVG FBS: 100-120/s.s of hypoglycemia at 70/last A1c:6.6    Unspecified sleep apnea     no cpap; patient states he lost weight and does not have ASHLEY anymore    Vertigo      Past Surgical History:   Procedure Laterality Date    APPENDECTOMY      CATARACT REMOVAL Bilateral     COLONOSCOPY      CORONARY ANGIOPLASTY WITH STENT PLACEMENT  2003    stent RCA 2003    CYSTOSCOPY,INSERT URETERAL STENT Right 03/05/2021    ERCP  12/2015    ENDOSCOPIC RETROGRADE CHOLANGIOPANCREATOGRAPHY (N/A Upper GI Region) ENDOSCOPIC SPHINCTEROTOMY (N/A Upper GI Region) ENDOSCOPIC STONE EXTRACTION/BALLOON SWEEP (N/A Upper GI Region)    HERNIA REPAIR Left 2014    LIH with mesh    HERNIA REPAIR Bilateral 1980s    MASTOIDECTOMY  1996    right modified canal wall down    ORTHOPEDIC SURGERY Right 1/14/03    Dupuytrens contracture release    OTHER SURGICAL HISTORY      skin lesion; local excision: SCC    TURP      TURP      TYMPANOSTOMY TUBE PLACEMENT Right     tube right ear    UPPER GASTROINTESTINAL ENDOSCOPY  03/11/2021    US GUIDED LIVER BIOPSY PERCUTANEOUS  4/16/2021    US GUIDED LIVER BIOPSY PERCUTANEOUS 4/16/2021 [facility]    VITRECTOMY       Family History   Problem Relation Age of Onset    Cancer Maternal Grandmother         colon    Heart Disease Maternal Grandfather      Social History     Socioeconomic History    Marital status:      Spouse name: Not on file    Number of children: Not on file    Years of education: Not on file    Highest education level: Not on file   Occupational History    Not on file   Tobacco Use    Smoking status: Never    Smokeless tobacco: Never   Substance and Sexual Activity    Alcohol use: No    Drug use: No    Sexual activity: Not on file   Other Topics Concern    Not on file   Social History Narrative    Not on file     Social Determinants of Health     Financial Resource Strain: Not on file   Food Insecurity: Not on file   Transportation Needs: Not on file   Physical Activity: Not on file   Stress: Not on file   Social Connections: Not on file   Intimate Partner Violence: Not on file   Housing Stability: Not on file     Current Outpatient Medications   Medication Sig Dispense Refill    tamsulosin (FLOMAX) 0.4 MG capsule Take 1 capsule by mouth daily 90 capsule 3    LORazepam (ATIVAN) 0.5 MG tablet TAKE 1 TABLET (0.5 MG) BY MOUTH 2 (TWO) TIMES A DAY AS NEEDED (VERTIGO)      insulin aspart (NOVOLOG) 100 UNIT/ML injection pen 14 units + 1:50 sliding scale with breakfast and lunch, 12 units +1:50 sliding scale with dinner      Insulin Degludec (TRESIBA FLEXTOUCH) 100 UNIT/ML SOPN Inject 34 Units into the skin every morning      pantoprazole (PROTONIX) 20 MG tablet TAKE 1 TABLET BY MOUTH EVERY DAY      atorvastatin (LIPITOR) 20 MG tablet Take 20 mg by mouth      aspirin 81 MG EC tablet Take 81 mg by mouth daily      vitamin D 25 MCG (1000 UT) CAPS Take 4,000 Units by mouth daily      lipase-protease-amylase (CREON) 04061-80473 units delayed release capsule Take 2 capsules by mouth 3 times daily (with meals)       No current facility-administered medications for this visit.        OBJECTIVE:  BP (!) 142/65 (Site: Right Upper Arm, Position: Sitting)   Pulse 58   Temp 97.9 °F (36.6 °C) (Oral)   Resp 18   Ht 5' 9\" (1.753 m)   Wt 181 lb 11.2 oz (82.4 kg)   SpO2 97%   BMI 26.83 kg/m²     Physical Exam:  Constitutional: Oriented to person, place, and time. Well-developed and well-nourished. HEENT: Normocephalic and atraumatic. Oropharynx is clear and moist.   Conjunctivae and EOM are normal. Pupils are equal, round, and reactive to light. No scleral icterus. Neck supple. No JVD present. No tracheal deviation present. No thyromegaly present. Lymph node No palpable submandibular, cervical, supraclavicular, axillary and inguinal lymph nodes. Skin Warm and dry. No bruising and no rash noted. No erythema. No pallor. Respiratory Effort normal and breath sounds normal.  No respiratory distress. No wheezes. No rales. No tenderness. CVS Normal rate, regular rhythm and normal heart sounds. Exam reveals no gallop, no friction and no rub. No murmur heard. Abdomen Soft. Bowel sounds are normal. Exhibits no distension. There is no tenderness. There is no rebound and no guarding. Neuro Normal reflexes. No cranial nerve deficit. Exhibits normal muscle tone, 5 of 5 strength of all extremities. MSK Normal range of motion in general.  No edema and no tenderness.    Psych Normal mood, affect, behavior, judgment and thought content      Labs:  Recent Results (from the past 24 hour(s))   CBC with Auto Differential    Collection Time: 12/07/22  3:00 PM   Result Value Ref Range    WBC 6.0 4.3 - 11.1 K/uL    RBC 5.06 4.23 - 5.6 M/uL    Hemoglobin 14.1 13.6 - 17.2 g/dL    Hematocrit 45.0 %    MCV 88.9 82.0 - 102.0 FL    MCH 27.9 26.1 - 32.9 PG    MCHC 31.3 (L) 31.4 - 35.0 g/dL    RDW 14.1 11.9 - 14.6 %    Platelets 383 224 - 447 K/uL    MPV 10.0 9.4 - 12.3 FL    nRBC 0.00 0.0 - 0.2 K/uL    Differential Type AUTOMATED      Seg Neutrophils 71 43 - 78 %    Lymphocytes 16 13 - 44 %    Monocytes 7 4.0 - 12.0 %    Eosinophils % 5 0.5 - 7.8 %    Basophils 1 0.0 - 2.0 %    Immature Granulocytes 0 0.0 - 5.0 %    Segs Absolute 4.3 1.7 - 8.2 K/UL    Absolute Lymph # 1.0 0.5 - 4.6 K/UL    Absolute Mono # 0.4 0.1 - 1.3 K/UL    Absolute Eos # 0.3 0.0 - 0.8 K/UL    Basophils Absolute 0.1 0.0 - 0.2 K/UL    Absolute Immature Granulocyte 0.0 0.0 - 0.5 K/UL       Imaging:  No results found for this or any previous visit. ASSESSMENT/PLAN:   Diagnosis Orders   1. History of hepatocellular carcinoma        2. Other fatigue            80 y.o. M consulted for liver mass. He was admitted for right flank pain and CT showed right ureter stone, also incidentally  found liver mass, I reviewed CT and discussed with pt to further evaluate with MRI or/and biopsy. He reports severe claustrophobia, but can not have CT liver protocol given the Cr 2.66, willing to try MRI liver with ativan, and had to be done with open  MRI and followed in [facility] on 4/13/2021, we reviewed MRI result still being suspicious of malignancy however undiagnostic given the lack of contrast, discussed to pursue ultrasound-guided biopsy on 4/16/2021 showed well differentiated hepatocellular carcinoma,  I discussed the pathology result with patient and wife, discussed the need for staging image however given his increased creatinine level and already had a recent CT urogram for abdomen, we just added a CT chest without contrast showed nonspecific small  nodules 4 millimeter, consulted Dr. Curt Weeks and performed of segment 6 liver resection on 5/12/2021, final pathology showed 5.4 cm HCC stage IIIa, followed on 7/28/2021, recovered well, still feeling weak and we discussed physical therapy, discussed his hypertension  and reports normal measuring at home and considered whitecoat hypertension, discussed continue to have surveillance every 6 months with imaging and AFP, will mainly use ultrasound because MRI will be difficult due to the severe claustrophobia and CT would  be limited due to the increased creatinine.   Return with daughter on 6/2/2022, ultrasound showed no recurrent disease in the liver, had a prolonged vertigo and follow ENT, blood pressure high in office again confirmed normal measurement at home, return on 12/7/2022, tired for taking care of wife on hospice and she has not been sleeping for several nights, nonetheless his recent ultrasound 11/25/2022 DAIN/CR, AFP pending and will call if needed, repeat ultrasound to avoid claustrophobia and repeat labs in 6 months and return to clinic, call as needed. All questions are answered to their satisfaction. They will call for further questions and concerns. ECOG PERFORMANCE STATUS - 0-Fully active, able to carry on all pre-disease performance without restriction. Pain - 0 - No pain/10. None/Minimal pain - not affecting QOL     Fatigue - No flowsheet data found. Distress - No flowsheet data found. Elements of this note have been dictated via voice recognition software. Text and phrases may be limited by the accuracy and autoconversion of the software. The chart has been reviewed, but errors may still be present. Armond Vera M.D.   Adama Claudio  [address] [place] [address]  Office : [phone]  Fax : [phone]

## 2023-02-08 NOTE — PROGRESS NOTES
[facility] DAILY PROGRESS NOTE    Admit Date:  3/10/2021    CC:  Esophagitis with Esophageal Ulcer and Duodenitis/ New Liver Mass    Problem List:  Principal Problem:    GI bleed (3/10/2021)    Active Problems:    Hyperlipidemia (8/1/2012)      CAD (coronary artery disease) (8/1/2012)      Stage 3 chronic kidney disease (2/7/2019)      Type 2 diabetes mellitus without complication ([facility] [address] (2/5/3312)      Mixed hyperlipidemia (11/18/2020)      Essential hypertension with goal blood pressure less than 130/85 (11/18/2020)      Right ureteral stone (3/5/2021)      Mr. Leobardo Darnell is a 80 y.o. male with PMH of including but not limited to, recent obstructive calculus with stent placement on 3/7, new liver lesion with current oncology evaluation and normal AFP with pending MRI, chronic pancreatitis followed by Dr. Alondra Andrade on Creon therapy, DM, TARIQ, claustrophobia who was seen in consultation at the request of Dr. Jennifer Paz for coffee ground emesis and a single episode of melena. On evaluation in the ED, he was found to have a Hgb of 10 (down from 12.7 on 3/6). EGD was done on 3/10/21 which showed old blood with large clots in the stomach limiting visualization and esophagitis. Repeat EGD was done on 3/11/21 after giving Reglan. This showed esophagitis with esophageal ulcer and duodenitis. Hg 6.9 this AM.  Getting transfused PRBC. 1. Esophageal Ulcer with Esophagitis  2. Duodenitis  3. Transfused Acute Blood Loss Anemia  4. Elevated LFTs  5. New Liver Mass     - Continue to monitor H/H. Transfuse for Hg < 7  - Monitor for active bleeding. If he has further GI bleeding, can consider colonoscopy. - Avoid NSAIDs  - Continue Protonix 40 mg BID  - Will add Carafate 1 g QID  - Patient is following up with Dr. Crystal Macdonald with Oncology and they are planning for outpatient MRI for evaluation of the liver mass.   I offered inpatient evaluation but patient prefers to do this all as outpatient. He says he has a lot going on right now with his GI bleed, then he has a TURP scheduled on Tuesday. - His LFTs have normalized. They were likely elevated from his liver mass. Can continue to monitor LFTs. His viral hepatitis panel was negative. - Will advance diet to GI soft diet. Can advance as he tolerates. - Will follow. Norma Pradhan MD   [facility]      Subjective:     Patient feels better today. He has not had any further GI bleeding. He is getting transfused for Hg < 7.     Medications:   Current Facility-Administered Medications   Medication Dose Route Frequency Provider Last Rate Last Admin    0.9% sodium chloride infusion 250 mL  250 mL IntraVENous PRN Josh Sethi MD        0.9% sodium chloride infusion 250 mL  250 mL IntraVENous PRN Josh Sethi MD        insulin lispro (HUMALOG) injection   SubCUTAneous AC&HS Jaycee Trujillo MD   Stopped at 03/11/21 2200    pantoprazole (PROTONIX) 40 mg in 0.9% sodium chloride 10 mL injection  40 mg IntraVENous Q12H Arina Burleson NP   40 mg at 03/11/21 2101    lipase-protease-amylase (ZENPEP 20,000) capsule 1 Cap  1 Cap Oral TID WITH MEALS Dagmar Breaux, DO   1 Cap at 03/11/21 1707    atorvastatin (LIPITOR) tablet 20 mg  20 mg Oral QHS Narda Breaux DO   20 mg at 03/11/21 2101    cholecalciferol (VITAMIN D3) (1000 Units /25 mcg) tablet 1,000 Units  1,000 Units Oral DAILY Alex NDIAYE, DO   1,000 Units at 03/11/21 8777    fluticasone propionate (FLONASE) 50 mcg/actuation nasal spray 2 Spray  2 Spray Both Nostrils DAILY Narda Breaux DO   2 Spray at 03/11/21 0834    hyoscyamine SL (LEVSIN/SL) tablet 0.125 mg  0.125 mg SubLINGual Q4H PRN Hema Franklin C, DO        insulin glargine (LANTUS) injection 10 Units  10 Units SubCUTAneous ACB/HS Hema Franklin C, DO   10 Units at 03/12/21 0610    tamsulosin (FLOMAX) capsule 0.4 mg  0.4 mg Oral BIDPC Narda Breaux DO   0.4 mg at 03/11/21 1707    sodium chloride (NS) flush 5-40 mL  5-40 mL IntraVENous Q8H Breaux, Narda C, DO   10 mL at 03/12/21 0606    sodium chloride (NS) flush 5-40 mL  5-40 mL IntraVENous PRN Colette Rase C, DO        acetaminophen (TYLENOL) tablet 650 mg  650 mg Oral Q6H PRN HudsonRosalyn James C, DO   650 mg at 03/12/21 0241    Or    acetaminophen (TYLENOL) suppository 650 mg  650 mg Rectal Q6H PRN Colette Rase C, DO        polyethylene glycol (MIRALAX) packet 17 g  17 g Oral DAILY PRN Colette Rase C, DO        promethazine (PHENERGAN) tablet 12.5 mg  12.5 mg Oral Q6H PRN Colette Rase C, DO        Or    ondansetron [facility]) injection 4 mg  4 mg IntraVENous Q6H PRN Breaux, Narda C, DO   4 mg at 03/10/21 1746    0.9% sodium chloride infusion  100 mL/hr IntraVENous CONTINUOUS Breaux, Arvilla Aldo C,  mL/hr at 03/10/21 1743 100 mL/hr at 03/10/21 1743       Review of Systems:  ROS was obtained, with pertinent positives as listed above. No chest pain or SOB. Diet:      Objective:   Vitals:  Visit Vitals  BP (!) 113/56 (BP 1 Location: Left upper arm, BP Patient Position: At rest)   Pulse 66   Temp 97.3 °F (36.3 °C)   Resp 20   Ht 5' 9\" (1.753 m)   Wt 79.4 kg (175 lb)   SpO2 97%   BMI 25.84 kg/m²     Intake/Output:  No intake/output data recorded. 03/10 1901 - 03/12 0700  In: 693 [P.O.:218; I.V.:475]  Out: 1900 [Urine:1900]  Exam:  General appearance: alert, cooperative, no distress  HEENT: different eye colors. Patient reports prior cataract surgery. Lungs: clear to auscultation bilaterally anteriorly  Heart: regular rate and rhythm  Abdomen: soft, non-tender.  Bowel sounds normal. No masses, no organomegaly  Extremities: extremities normal, atraumatic, no cyanosis or edema  Neuro:  alert and oriented    Data Review (Labs):    Recent Labs     03/12/21  0632 03/12/21  0538 03/12/21  0035 03/11/21  1635 03/11/21  0626 03/10/21  2342 03/10/21  1937 03/10/21  1932 03/10/21  0957   WBC  --   --   --   -- 8.5  --   --   --  13.0*   HGB 6.9* 6.7* 7.0* 7.7* 7.8* 6.8*  --  7.1* 10.0*   HCT 21.9* 21.5* 22.0* 24.9* 24.1* 21.7*  --  23.1* 31.5*   PLT  --   --   --   --  214  --   --   --  284   MCV  --   --   --   --  88.0  --   --   --  88.0   NA  --   --   --   --  144  --   --   --  138   K  --   --   --   --  4.8  --   --   --  4.5   CL  --   --   --   --  117*  --   --   --  107   CO2  --   --   --   --  23  --   --   --  23   BUN  --   --   --   --  62*  --   --   --  62*   AST  --  33  --   --  45*  --  74*  --  158*   ALT  --  37  --   --  51  --  69*  --  110*   INR  --   --   --   --   --   --   --   --  1.1         Ana Garcia MD  [facility] [employer] Preamble Text (Leave Blank If You Do Not Want): Extensive wide undermining was performed.

## 2023-03-29 ENCOUNTER — OFFICE VISIT (OUTPATIENT)
Dept: CARDIOLOGY CLINIC | Age: 88
End: 2023-03-29
Payer: MEDICARE

## 2023-03-29 VITALS
HEIGHT: 69 IN | BODY MASS INDEX: 27.11 KG/M2 | DIASTOLIC BLOOD PRESSURE: 54 MMHG | HEART RATE: 69 BPM | WEIGHT: 183 LBS | SYSTOLIC BLOOD PRESSURE: 132 MMHG

## 2023-03-29 DIAGNOSIS — I25.10 CORONARY ARTERY DISEASE INVOLVING NATIVE CORONARY ARTERY OF NATIVE HEART WITHOUT ANGINA PECTORIS: Primary | ICD-10-CM

## 2023-03-29 PROCEDURE — 1123F ACP DISCUSS/DSCN MKR DOCD: CPT | Performed by: INTERNAL MEDICINE

## 2023-03-29 PROCEDURE — 99213 OFFICE O/P EST LOW 20 MIN: CPT | Performed by: INTERNAL MEDICINE

## 2023-03-29 PROCEDURE — G8484 FLU IMMUNIZE NO ADMIN: HCPCS | Performed by: INTERNAL MEDICINE

## 2023-03-29 PROCEDURE — G8417 CALC BMI ABV UP PARAM F/U: HCPCS | Performed by: INTERNAL MEDICINE

## 2023-03-29 PROCEDURE — 1036F TOBACCO NON-USER: CPT | Performed by: INTERNAL MEDICINE

## 2023-03-29 PROCEDURE — G8427 DOCREV CUR MEDS BY ELIG CLIN: HCPCS | Performed by: INTERNAL MEDICINE

## 2023-03-29 PROCEDURE — 93000 ELECTROCARDIOGRAM COMPLETE: CPT | Performed by: INTERNAL MEDICINE

## 2023-03-29 ASSESSMENT — ENCOUNTER SYMPTOMS
SPUTUM PRODUCTION: 0
BOWEL INCONTINENCE: 0
DIARRHEA: 0
CHEST TIGHTNESS: 0
ABDOMINAL PAIN: 0
HOARSE VOICE: 0
BLURRED VISION: 0
COLOR CHANGE: 0
ORTHOPNEA: 0
HEMATEMESIS: 0
WHEEZING: 0
HEMATOCHEZIA: 0
SHORTNESS OF BREATH: 0

## 2023-03-29 NOTE — PROGRESS NOTES
vision. Cardiovascular:  Negative for chest pain, claudication, cyanosis, dyspnea on exertion, irregular heartbeat, leg swelling, near-syncope, orthopnea, palpitations, paroxysmal nocturnal dyspnea and syncope. Respiratory:  Negative for chest tightness, shortness of breath, sputum production and wheezing. Endocrine: Negative for polydipsia, polyphagia and polyuria. Skin:  Negative for color change. Musculoskeletal:  Negative for muscle weakness. Gastrointestinal:  Negative for abdominal pain, bowel incontinence, diarrhea, hematemesis and hematochezia. Genitourinary:  Negative for dysuria, frequency and hematuria. Neurological:  Positive for vertigo. Negative for dizziness, focal weakness, light-headedness, loss of balance, numbness, sensory change and weakness. Psychiatric/Behavioral:  Negative for altered mental status and memory loss. PHYSICAL EXAM:   BP (!) 132/54   Pulse 69   Ht 5' 9\" (1.753 m)   Wt 183 lb (83 kg)   BMI 27.02 kg/m²      Physical Exam  Constitutional:       Appearance: Normal appearance. HENT:      Head: Normocephalic and atraumatic. Nose: Nose normal.   Eyes:      Extraocular Movements: Extraocular movements intact. Pupils: Pupils are equal, round, and reactive to light. Neck:      Vascular: No carotid bruit. Cardiovascular:      Rate and Rhythm: Regular rhythm. Pulses: Normal pulses. Heart sounds: No murmur heard. Pulmonary:      Effort: Pulmonary effort is normal.      Breath sounds: Normal breath sounds. Abdominal:      General: Abdomen is flat. Bowel sounds are normal.      Palpations: Abdomen is soft. Musculoskeletal:         General: Normal range of motion. Cervical back: Normal range of motion and neck supple. Skin:     General: Skin is warm and dry. Neurological:      General: No focal deficit present. Mental Status: He is alert and oriented to person, place, and time.    Psychiatric:         Mood and Affect:

## 2023-06-06 ENCOUNTER — HOSPITAL ENCOUNTER (OUTPATIENT)
Dept: ULTRASOUND IMAGING | Age: 88
Discharge: HOME | End: 2023-06-09
Payer: MEDICARE

## 2023-06-06 DIAGNOSIS — Z85.05 HISTORY OF HEPATOCELLULAR CARCINOMA: ICD-10-CM

## 2023-06-06 PROCEDURE — 76700 US EXAM ABDOM COMPLETE: CPT

## 2023-07-31 ENCOUNTER — APPOINTMENT (OUTPATIENT)
Dept: CT IMAGING | Age: 88
End: 2023-07-31
Payer: MEDICARE

## 2023-07-31 ENCOUNTER — HOSPITAL ENCOUNTER (EMERGENCY)
Age: 88
Discharge: HOME | End: 2023-07-31
Attending: EMERGENCY MEDICINE
Payer: MEDICARE

## 2023-07-31 VITALS
WEIGHT: 182 LBS | RESPIRATION RATE: 18 BRPM | HEIGHT: 69 IN | HEART RATE: 61 BPM | OXYGEN SATURATION: 92 % | TEMPERATURE: 97.6 F | BODY MASS INDEX: 26.96 KG/M2 | DIASTOLIC BLOOD PRESSURE: 64 MMHG | SYSTOLIC BLOOD PRESSURE: 121 MMHG

## 2023-07-31 DIAGNOSIS — R42 DIZZINESS: Primary | ICD-10-CM

## 2023-07-31 DIAGNOSIS — H83.09 LABYRINTHITIS, UNSPECIFIED LATERALITY: ICD-10-CM

## 2023-07-31 LAB
ALBUMIN SERPL-MCNC: 3.4 G/DL (ref 3.2–4.6)
ALBUMIN/GLOB SERPL: 1 (ref 0.4–1.6)
ALP SERPL-CCNC: 62 U/L (ref 50–136)
ALT SERPL-CCNC: 20 U/L (ref 12–65)
ANION GAP SERPL CALC-SCNC: 5 MMOL/L (ref 2–11)
AST SERPL-CCNC: 16 U/L (ref 15–37)
BASOPHILS # BLD: 0.1 K/UL (ref 0–0.2)
BASOPHILS NFR BLD: 1 % (ref 0–2)
BILIRUB SERPL-MCNC: 0.4 MG/DL (ref 0.2–1.1)
BUN SERPL-MCNC: 21 MG/DL (ref 8–23)
CALCIUM SERPL-MCNC: 8.9 MG/DL (ref 8.3–10.4)
CHLORIDE SERPL-SCNC: 111 MMOL/L (ref 101–110)
CO2 SERPL-SCNC: 24 MMOL/L (ref 21–32)
CREAT SERPL-MCNC: 1.81 MG/DL (ref 0.8–1.5)
DIFFERENTIAL METHOD BLD: NORMAL
EKG ATRIAL RATE: 58 BPM
EKG DIAGNOSIS: NORMAL
EKG P AXIS: 34 DEGREES
EKG P-R INTERVAL: 232 MS
EKG Q-T INTERVAL: 402 MS
EKG QRS DURATION: 106 MS
EKG QTC CALCULATION (BAZETT): 395 MS
EKG R AXIS: 55 DEGREES
EKG T AXIS: 60 DEGREES
EKG VENTRICULAR RATE: 58 BPM
EOSINOPHIL # BLD: 0.5 K/UL (ref 0–0.8)
EOSINOPHIL NFR BLD: 7 % (ref 0.5–7.8)
ERYTHROCYTE [DISTWIDTH] IN BLOOD BY AUTOMATED COUNT: 13.8 % (ref 11.9–14.6)
GLOBULIN SER CALC-MCNC: 3.4 G/DL (ref 2.8–4.5)
GLUCOSE SERPL-MCNC: 237 MG/DL (ref 65–100)
HCT VFR BLD AUTO: 43.7 % (ref 41.1–50.3)
HGB BLD-MCNC: 13.8 G/DL (ref 13.6–17.2)
IMM GRANULOCYTES # BLD AUTO: 0 K/UL (ref 0–0.5)
IMM GRANULOCYTES NFR BLD AUTO: 0 % (ref 0–5)
LYMPHOCYTES # BLD: 1.6 K/UL (ref 0.5–4.6)
LYMPHOCYTES NFR BLD: 25 % (ref 13–44)
MCH RBC QN AUTO: 27.3 PG (ref 26.1–32.9)
MCHC RBC AUTO-ENTMCNC: 31.6 G/DL (ref 31.4–35)
MCV RBC AUTO: 86.5 FL (ref 82–102)
MONOCYTES # BLD: 0.6 K/UL (ref 0.1–1.3)
MONOCYTES NFR BLD: 9 % (ref 4–12)
NEUTS SEG # BLD: 3.8 K/UL (ref 1.7–8.2)
NEUTS SEG NFR BLD: 58 % (ref 43–78)
NRBC # BLD: 0 K/UL (ref 0–0.2)
PLATELET # BLD AUTO: 195 K/UL (ref 150–450)
PMV BLD AUTO: 10.3 FL (ref 9.4–12.3)
POTASSIUM SERPL-SCNC: 3.9 MMOL/L (ref 3.5–5.1)
PROT SERPL-MCNC: 6.8 G/DL (ref 6.3–8.2)
RBC # BLD AUTO: 5.05 M/UL (ref 4.23–5.6)
SARS-COV-2 RDRP RESP QL NAA+PROBE: NOT DETECTED
SODIUM SERPL-SCNC: 140 MMOL/L (ref 133–143)
SOURCE: NORMAL
WBC # BLD AUTO: 6.5 K/UL (ref 4.3–11.1)

## 2023-07-31 PROCEDURE — 80053 COMPREHEN METABOLIC PANEL: CPT

## 2023-07-31 PROCEDURE — 87635 SARS-COV-2 COVID-19 AMP PRB: CPT

## 2023-07-31 PROCEDURE — 2580000003 HC RX 258: Performed by: EMERGENCY MEDICINE

## 2023-07-31 PROCEDURE — 6360000002 HC RX W HCPCS: Performed by: EMERGENCY MEDICINE

## 2023-07-31 PROCEDURE — 85025 COMPLETE CBC W/AUTO DIFF WBC: CPT

## 2023-07-31 PROCEDURE — 70450 CT HEAD/BRAIN W/O DYE: CPT

## 2023-07-31 PROCEDURE — 93010 ELECTROCARDIOGRAM REPORT: CPT | Performed by: INTERNAL MEDICINE

## 2023-07-31 PROCEDURE — 93005 ELECTROCARDIOGRAM TRACING: CPT | Performed by: EMERGENCY MEDICINE

## 2023-07-31 PROCEDURE — 96374 THER/PROPH/DIAG INJ IV PUSH: CPT

## 2023-07-31 PROCEDURE — 99284 EMERGENCY DEPT VISIT MOD MDM: CPT

## 2023-07-31 RX ORDER — SODIUM CHLORIDE 9 MG/ML
INJECTION, SOLUTION INTRAVENOUS CONTINUOUS
Status: DISCONTINUED | OUTPATIENT
Start: 2023-07-31 | End: 2023-07-31 | Stop reason: HOSPADM

## 2023-07-31 RX ORDER — ONDANSETRON 2 MG/ML
4 INJECTION INTRAMUSCULAR; INTRAVENOUS
Status: COMPLETED | OUTPATIENT
Start: 2023-07-31 | End: 2023-07-31

## 2023-07-31 RX ADMIN — SODIUM CHLORIDE: 9 INJECTION, SOLUTION INTRAVENOUS at 04:32

## 2023-07-31 RX ADMIN — ONDANSETRON 4 MG: 2 INJECTION INTRAMUSCULAR; INTRAVENOUS at 05:26

## 2023-07-31 ASSESSMENT — LIFESTYLE VARIABLES
HOW MANY STANDARD DRINKS CONTAINING ALCOHOL DO YOU HAVE ON A TYPICAL DAY: PATIENT DOES NOT DRINK
HOW OFTEN DO YOU HAVE A DRINK CONTAINING ALCOHOL: NEVER

## 2023-07-31 ASSESSMENT — PAIN DESCRIPTION - LOCATION: LOCATION: HEAD

## 2023-07-31 ASSESSMENT — PAIN SCALES - GENERAL: PAINLEVEL_OUTOF10: 6

## 2023-07-31 ASSESSMENT — ENCOUNTER SYMPTOMS: NAUSEA: 1

## 2023-07-31 NOTE — ED TRIAGE NOTES
Per EMS: from home, c/o dizziness, nausea and malaise x3hrs, has hx of vertigo but expresses this is worse; 160/90, bgl 200, 4mg Zofran en route. Pt verbalizes that he also felt sweaty and has loud roaring tinnitus accompanied by frontal headache.

## 2023-10-04 ENCOUNTER — OFFICE VISIT (OUTPATIENT)
Age: 88
End: 2023-10-04
Payer: MEDICARE

## 2023-10-04 VITALS
HEART RATE: 62 BPM | SYSTOLIC BLOOD PRESSURE: 120 MMHG | BODY MASS INDEX: 27.55 KG/M2 | HEIGHT: 69 IN | DIASTOLIC BLOOD PRESSURE: 60 MMHG | WEIGHT: 186 LBS

## 2023-10-04 DIAGNOSIS — I25.10 CORONARY ARTERY DISEASE INVOLVING NATIVE CORONARY ARTERY OF NATIVE HEART WITHOUT ANGINA PECTORIS: Primary | ICD-10-CM

## 2023-10-04 PROCEDURE — 99213 OFFICE O/P EST LOW 20 MIN: CPT | Performed by: INTERNAL MEDICINE

## 2023-10-04 PROCEDURE — 1123F ACP DISCUSS/DSCN MKR DOCD: CPT | Performed by: INTERNAL MEDICINE

## 2023-10-04 PROCEDURE — G8417 CALC BMI ABV UP PARAM F/U: HCPCS | Performed by: INTERNAL MEDICINE

## 2023-10-04 PROCEDURE — 1036F TOBACCO NON-USER: CPT | Performed by: INTERNAL MEDICINE

## 2023-10-04 PROCEDURE — G8484 FLU IMMUNIZE NO ADMIN: HCPCS | Performed by: INTERNAL MEDICINE

## 2023-10-04 PROCEDURE — G8427 DOCREV CUR MEDS BY ELIG CLIN: HCPCS | Performed by: INTERNAL MEDICINE

## 2023-10-04 ASSESSMENT — ENCOUNTER SYMPTOMS
ORTHOPNEA: 0
BOWEL INCONTINENCE: 0
HEMATEMESIS: 0
SHORTNESS OF BREATH: 0
WHEEZING: 0
DIARRHEA: 0
BLURRED VISION: 0
HOARSE VOICE: 0
CHEST TIGHTNESS: 0
COLOR CHANGE: 0
ABDOMINAL PAIN: 0
SPUTUM PRODUCTION: 0
HEMATOCHEZIA: 0

## 2023-10-04 NOTE — PROGRESS NOTES
past 672 hour(s)). No results found for: \"CHOL\", \"CHOLPOCT\", \"CHOLX\", \"CHLST\", \"CHOLV\", \"HDL\", \"HDLPOC\", \"HDLC\", \"LDL\", \"LDLC\", \"VLDLC\", \"VLDL\", \"TGLX\", \"TRIGL\"  No results found for any visits on 10/04/23. ASSESSMENT and PLAN    Pool Nguyễn was seen today for coronary artery disease and follow-up. Diagnoses and all orders for this visit:    Coronary artery disease involving native coronary artery of native heart without angina pectoris:Stable with no reported chest pain. Continue ASA and Lipitor. Target LDL<70. Disposition:    Return in about 1 year (around 10/4/2024).                 Lizz Bautista MD  10/4/2023  2:30 PM

## 2023-10-15 DIAGNOSIS — N40.1 BENIGN PROSTATIC HYPERPLASIA WITH URINARY FREQUENCY: ICD-10-CM

## 2023-10-15 DIAGNOSIS — R35.0 BENIGN PROSTATIC HYPERPLASIA WITH URINARY FREQUENCY: ICD-10-CM

## 2023-10-16 RX ORDER — TAMSULOSIN HYDROCHLORIDE 0.4 MG/1
CAPSULE ORAL DAILY
Qty: 90 CAPSULE | Refills: 3 | Status: SHIPPED | OUTPATIENT
Start: 2023-10-16

## 2023-11-08 ENCOUNTER — OFFICE VISIT (OUTPATIENT)
Dept: UROLOGY | Age: 88
End: 2023-11-08
Payer: MEDICARE

## 2023-11-08 DIAGNOSIS — N20.0 CALCULUS OF KIDNEY: ICD-10-CM

## 2023-11-08 DIAGNOSIS — N40.1 BENIGN PROSTATIC HYPERPLASIA WITH URINARY FREQUENCY: Primary | ICD-10-CM

## 2023-11-08 DIAGNOSIS — R35.0 BENIGN PROSTATIC HYPERPLASIA WITH URINARY FREQUENCY: Primary | ICD-10-CM

## 2023-11-08 LAB
BILIRUBIN, URINE, POC: NEGATIVE
BLOOD URINE, POC: NORMAL
GLUCOSE URINE, POC: NEGATIVE
KETONES, URINE, POC: NEGATIVE
LEUKOCYTE ESTERASE, URINE, POC: NEGATIVE
NITRITE, URINE, POC: NEGATIVE
PH, URINE, POC: 5.5 (ref 4.6–8)
PROTEIN,URINE, POC: NEGATIVE
PVR, POC: 0 CC
SPECIFIC GRAVITY, URINE, POC: 1 (ref 1–1.03)
URINALYSIS CLARITY, POC: NORMAL
URINALYSIS COLOR, POC: NORMAL
UROBILINOGEN, POC: NORMAL

## 2023-11-08 PROCEDURE — G8484 FLU IMMUNIZE NO ADMIN: HCPCS | Performed by: UROLOGY

## 2023-11-08 PROCEDURE — 1123F ACP DISCUSS/DSCN MKR DOCD: CPT | Performed by: UROLOGY

## 2023-11-08 PROCEDURE — G8417 CALC BMI ABV UP PARAM F/U: HCPCS | Performed by: UROLOGY

## 2023-11-08 PROCEDURE — 81003 URINALYSIS AUTO W/O SCOPE: CPT | Performed by: UROLOGY

## 2023-11-08 PROCEDURE — 51798 US URINE CAPACITY MEASURE: CPT | Performed by: UROLOGY

## 2023-11-08 PROCEDURE — 1036F TOBACCO NON-USER: CPT | Performed by: UROLOGY

## 2023-11-08 PROCEDURE — 74018 RADEX ABDOMEN 1 VIEW: CPT | Performed by: UROLOGY

## 2023-11-08 PROCEDURE — G8427 DOCREV CUR MEDS BY ELIG CLIN: HCPCS | Performed by: UROLOGY

## 2023-11-08 PROCEDURE — 99214 OFFICE O/P EST MOD 30 MIN: CPT | Performed by: UROLOGY

## 2023-11-08 ASSESSMENT — ENCOUNTER SYMPTOMS
NAUSEA: 0
BACK PAIN: 0

## 2023-11-08 NOTE — PROGRESS NOTES
[facility]  [address]  [phone]          Stephania Yancey  : 1933    Chief Complaint   Patient presents with    Follow-up          HPI     Stephania Yancey is a 80 y.o. male with a PMH of BPH/LUTS and kidney stones s/p TURP and R URS/LL on 3/2021 who returns for follow up     Seen 10/2022 by me. TURP Path negative for cancer. He was off all prostate/bladder meds in 10/2022 but opted to restart flomax 10/22 and is doing well today. No recent issues with stones / flank pain. Does report starting/stopping/dribbling better with flomax. PVR 0 cc at last visit. KUB today shows new L 6 mm and 2 mm stones, non-obstructing. Past Medical History:   Diagnosis Date    Abnormal CT of liver 2021    New heterogeneous liver mass measuring up to 6.4 cm.  Further evaluation required    Anemia     GI esophageal ulcer per EGD- was given 2 units prbc's 3/12/21, eliquis has been stopped    AR (allergic rhinitis) 8/3/2016    Arthritis     Benign paroxysmal positional vertigo     Bilateral impacted cerumen     BPH (benign prostatic hyperplasia)     CAD (coronary artery disease)     2003 stent placed; MI in      Chronic kidney disease     Stage III kidney disease elevated creatinine BUN    Chronic otitis media     Chronic pain     herniated disc in lower back; ESIs in recent past    Chronic pancreatitis ([address])     Claustrophobia     Diverticulitis 8/3/2016    DNS (deviated nasal septum)     ETD (eustachian tube dysfunction)     GERD (gastroesophageal reflux disease)     controlled w/ prescription meds and OTC meds (pepcid)    H/O heart artery stent     S/P RCA stent by Dr. Maria Elena Anderson in     Heart disease 8/3/2016    Hepatocellular carcinoma ([address])     High frequency hearing loss     History of bilateral inguinal hernia repair     History of gastroesophageal reflux (GERD)     controlled w/ med; no breakthrough GERD    Hx of exercise

## 2023-11-13 ASSESSMENT — ENCOUNTER SYMPTOMS
COUGH: 0
SHORTNESS OF BREATH: 0
SKIN LESIONS: 0
VOMITING: 0
BLOOD IN STOOL: 0
EYE DISCHARGE: 0
DIARRHEA: 0
CONSTIPATION: 0
HEARTBURN: 0
ABDOMINAL PAIN: 0
WHEEZING: 0
EYE PAIN: 0
INDIGESTION: 0

## 2023-12-14 ENCOUNTER — HOSPITAL ENCOUNTER (OUTPATIENT)
Dept: ULTRASOUND IMAGING | Age: 88
Discharge: HOME | End: 2023-12-14
Attending: INTERNAL MEDICINE
Payer: MEDICARE

## 2023-12-14 DIAGNOSIS — Z85.05 HISTORY OF HEPATOCELLULAR CARCINOMA: ICD-10-CM

## 2023-12-14 PROCEDURE — 76700 US EXAM ABDOM COMPLETE: CPT

## 2024-06-19 ENCOUNTER — HOSPITAL ENCOUNTER (OUTPATIENT)
Dept: ULTRASOUND IMAGING | Age: 89
Discharge: HOME | End: 2024-06-22
Attending: INTERNAL MEDICINE
Payer: MEDICARE

## 2024-06-19 DIAGNOSIS — Z85.05 HISTORY OF HEPATOCELLULAR CARCINOMA: ICD-10-CM

## 2024-06-19 PROCEDURE — 76700 US EXAM ABDOM COMPLETE: CPT

## 2024-07-17 ENCOUNTER — HOSPITAL ENCOUNTER (OUTPATIENT)
Dept: LAB | Age: 89
Discharge: HOME | End: 2024-07-20
Payer: MEDICARE

## 2024-07-17 ENCOUNTER — OFFICE VISIT (OUTPATIENT)
Dept: ONCOLOGY | Age: 89
End: 2024-07-17
Payer: MEDICARE

## 2024-07-17 VITALS
OXYGEN SATURATION: 96 % | BODY MASS INDEX: 27.47 KG/M2 | TEMPERATURE: 97.6 F | RESPIRATION RATE: 18 BRPM | HEIGHT: 69 IN | SYSTOLIC BLOOD PRESSURE: 134 MMHG | HEART RATE: 72 BPM | WEIGHT: 185.5 LBS | DIASTOLIC BLOOD PRESSURE: 69 MMHG

## 2024-07-17 DIAGNOSIS — Z85.05 HISTORY OF HEPATOCELLULAR CARCINOMA: ICD-10-CM

## 2024-07-17 DIAGNOSIS — R91.8 LUNG MASS: ICD-10-CM

## 2024-07-17 DIAGNOSIS — B18.2 CHRONIC HEPATITIS C WITHOUT HEPATIC COMA (HCC): ICD-10-CM

## 2024-07-17 DIAGNOSIS — R79.89 ELEVATED SERUM CREATININE: ICD-10-CM

## 2024-07-17 DIAGNOSIS — C22.0 HEPATOCELLULAR CARCINOMA (HCC): ICD-10-CM

## 2024-07-17 DIAGNOSIS — R91.8 RIGHT LOWER LOBE LUNG MASS: ICD-10-CM

## 2024-07-17 DIAGNOSIS — F40.240 CLAUSTROPHOBIA: ICD-10-CM

## 2024-07-17 DIAGNOSIS — Z85.05 HISTORY OF HEPATOCELLULAR CARCINOMA: Primary | ICD-10-CM

## 2024-07-17 DIAGNOSIS — R91.1 SOLITARY PULMONARY NODULE: ICD-10-CM

## 2024-07-17 LAB
AFP-TM SERPL-MCNC: 5.58 NG/ML (ref 0–8.3)
ALBUMIN SERPL-MCNC: 3.5 G/DL (ref 3.2–4.6)
ALBUMIN/GLOB SERPL: 1.2 (ref 1–1.9)
ALP SERPL-CCNC: 74 U/L (ref 40–129)
ALT SERPL-CCNC: 53 U/L (ref 12–65)
ANION GAP SERPL CALC-SCNC: 14 MMOL/L (ref 9–18)
AST SERPL-CCNC: 32 U/L (ref 15–37)
BASOPHILS # BLD: 0 K/UL (ref 0–0.2)
BASOPHILS NFR BLD: 0 % (ref 0–2)
BILIRUB SERPL-MCNC: 0.5 MG/DL (ref 0–1.2)
BUN SERPL-MCNC: 37 MG/DL (ref 8–23)
CALCIUM SERPL-MCNC: 8.5 MG/DL (ref 8.8–10.2)
CHLORIDE SERPL-SCNC: 100 MMOL/L (ref 98–107)
CO2 SERPL-SCNC: 21 MMOL/L (ref 20–28)
CREAT SERPL-MCNC: 2.07 MG/DL (ref 0.8–1.3)
DIFFERENTIAL METHOD BLD: ABNORMAL
EOSINOPHIL # BLD: 0 K/UL (ref 0–0.8)
EOSINOPHIL NFR BLD: 0 % (ref 0.5–7.8)
ERYTHROCYTE [DISTWIDTH] IN BLOOD BY AUTOMATED COUNT: 14.6 % (ref 11.9–14.6)
GLOBULIN SER CALC-MCNC: 3 G/DL (ref 2.3–3.5)
GLUCOSE SERPL-MCNC: 252 MG/DL (ref 70–99)
HCT VFR BLD AUTO: 46.3 % (ref 41.1–50.3)
HGB BLD-MCNC: 14.9 G/DL (ref 13.6–17.2)
IMM GRANULOCYTES # BLD AUTO: 0 K/UL (ref 0–0.5)
IMM GRANULOCYTES NFR BLD AUTO: 1 % (ref 0–5)
LYMPHOCYTES # BLD: 0.5 K/UL (ref 0.5–4.6)
LYMPHOCYTES NFR BLD: 6 % (ref 13–44)
MCH RBC QN AUTO: 27.4 PG (ref 26.1–32.9)
MCHC RBC AUTO-ENTMCNC: 32.2 G/DL (ref 31.4–35)
MCV RBC AUTO: 85.3 FL (ref 82–102)
MONOCYTES # BLD: 0.1 K/UL (ref 0.1–1.3)
MONOCYTES NFR BLD: 1 % (ref 4–12)
NEUTS SEG # BLD: 7.5 K/UL (ref 1.7–8.2)
NEUTS SEG NFR BLD: 92 % (ref 43–78)
NRBC # BLD: 0 K/UL (ref 0–0.2)
PLATELET # BLD AUTO: 218 K/UL (ref 150–450)
PMV BLD AUTO: 10.3 FL (ref 9.4–12.3)
POTASSIUM SERPL-SCNC: 4.9 MMOL/L (ref 3.5–5.1)
PROT SERPL-MCNC: 6.4 G/DL (ref 6.3–8.2)
RBC # BLD AUTO: 5.43 M/UL (ref 4.23–5.6)
SODIUM SERPL-SCNC: 135 MMOL/L (ref 136–145)
WBC # BLD AUTO: 8.2 K/UL (ref 4.3–11.1)

## 2024-07-17 PROCEDURE — G8417 CALC BMI ABV UP PARAM F/U: HCPCS | Performed by: INTERNAL MEDICINE

## 2024-07-17 PROCEDURE — G8427 DOCREV CUR MEDS BY ELIG CLIN: HCPCS | Performed by: INTERNAL MEDICINE

## 2024-07-17 PROCEDURE — 80053 COMPREHEN METABOLIC PANEL: CPT

## 2024-07-17 PROCEDURE — 1036F TOBACCO NON-USER: CPT | Performed by: INTERNAL MEDICINE

## 2024-07-17 PROCEDURE — 1123F ACP DISCUSS/DSCN MKR DOCD: CPT | Performed by: INTERNAL MEDICINE

## 2024-07-17 PROCEDURE — 36415 COLL VENOUS BLD VENIPUNCTURE: CPT

## 2024-07-17 PROCEDURE — 82105 ALPHA-FETOPROTEIN SERUM: CPT

## 2024-07-17 PROCEDURE — 85025 COMPLETE CBC W/AUTO DIFF WBC: CPT

## 2024-07-17 PROCEDURE — 99214 OFFICE O/P EST MOD 30 MIN: CPT | Performed by: INTERNAL MEDICINE

## 2024-07-17 RX ORDER — TRIAMTERENE AND HYDROCHLOROTHIAZIDE 37.5; 25 MG/1; MG/1
CAPSULE ORAL DAILY
COMMUNITY
Start: 2024-07-16 | End: 2025-07-16

## 2024-07-17 RX ORDER — PREDNISONE 20 MG/1
20 TABLET ORAL
COMMUNITY
Start: 2024-07-16

## 2024-07-17 ASSESSMENT — PATIENT HEALTH QUESTIONNAIRE - PHQ9
1. LITTLE INTEREST OR PLEASURE IN DOING THINGS: NOT AT ALL
SUM OF ALL RESPONSES TO PHQ QUESTIONS 1-9: 0
SUM OF ALL RESPONSES TO PHQ QUESTIONS 1-9: 0
2. FEELING DOWN, DEPRESSED OR HOPELESS: NOT AT ALL
SUM OF ALL RESPONSES TO PHQ9 QUESTIONS 1 & 2: 0
SUM OF ALL RESPONSES TO PHQ QUESTIONS 1-9: 0
SUM OF ALL RESPONSES TO PHQ QUESTIONS 1-9: 0

## 2024-07-17 NOTE — PROGRESS NOTES
[facility]: Office Visit Progress Note      Chief Complaint:    HCC      History of Present Illness:  89 y.o. M admitted on 3/5/21 with a right ureteral stone.  He has a PMH of BPH, CAD, BPH, diverticulitis, and previous  TURP 20 years ago.  He presented to the ER on 3/2 with severe right flank pain.  He had a CT urogram on 3/2 which showed an obstructing 4 mm calculus in the proximal right ureter resulting in mild right hydroureteronephrosis with inflammatory stranding,  new heterogenous liver mass up to 6.4 cm, chronic pancreatitis, and prostatomegaly with urinary bladder wall thickening.          He had a cytoscopy on 3/5 and a right ureter stent placed with cooper insertion over wire.  He was noticed to have an increase in Cr (2x BL) at 3.25, now improved today to 2.63.  We were consulted given the new liver mass for our recommendations.                He received segment 6 liver resection with Dr. Duff on 5/12/2021:           Interim history update in A/P.      Review of Systems:      Constitutional   fatigue.  Denies fever or chills.  Denies weight loss or appetite changes. Denies anorexia.        HEENT  Denies trauma, bluring vision, ear pain, nosebleeds, sore throat, neck pain and ear discharge.    Difficulty hearing      Skin  Denies lesions or rashes.   Hx of skin cancer     Lungs  Denies shortness of breath, cough, sputum production or hemoptysis.     Cardiovascular  Denies chest pain, palpitations, orthopnea, claudication and leg swelling.     Gastrointestinal  Denies nausea, vomiting, bowel changes.  Denies bloody or black stools. Denies abdominal pain.       Denies dysuria, frequency or hesitancy of urination   Waking to urinate 2x/night      Neuro  Denies visual changes or ataxia. Denies dizziness, tingling, tremors, sensory change, speech change, focal weakness and headaches.     Hematology  Denies nasal/gum bleeding, denies easy bruise     Endo  Denies heat/cold intolerance

## 2024-07-17 NOTE — PATIENT INSTRUCTIONS
Roche ECLIA methodology  Patient's results of tumor marker testing may not be comparable to labs using different manufacturers/methods.          Please refer to After Visit Summary or [employer] for upcoming appointment information. Please call our office for rescheduling needs at least 24 hours before your scheduled appointment time.If you have any questions regarding your upcoming schedule please reach out to your care team through [employer] or call [phone].     Please notify your assigned Nurse Navigator of any unplanned hospital admissions or Emergency Room visits within 24 hours of discharge.    -------------------------------------------------------------------------------------------------------------------  Please call our office at [phone] if you have any  of the following symptoms:   Fever of 100.5 or greater  Chills  Shortness of breath  Swelling or pain in one leg    After office hours an answering service is available and will contact a provider for emergencies or if you are experiencing any of the above symptoms.        ARMINDA NESBITT RN

## 2024-08-19 ENCOUNTER — APPOINTMENT (OUTPATIENT)
Dept: CT IMAGING | Age: 89
End: 2024-08-19
Payer: MEDICARE

## 2024-08-19 ENCOUNTER — HOSPITAL ENCOUNTER (EMERGENCY)
Age: 89
Discharge: HOME | End: 2024-08-19
Attending: EMERGENCY MEDICINE
Payer: MEDICARE

## 2024-08-19 ENCOUNTER — APPOINTMENT (OUTPATIENT)
Dept: GENERAL RADIOLOGY | Age: 89
End: 2024-08-19
Payer: MEDICARE

## 2024-08-19 VITALS
WEIGHT: 180 LBS | HEART RATE: 68 BPM | TEMPERATURE: 97.5 F | RESPIRATION RATE: 15 BRPM | SYSTOLIC BLOOD PRESSURE: 145 MMHG | OXYGEN SATURATION: 97 % | DIASTOLIC BLOOD PRESSURE: 76 MMHG | BODY MASS INDEX: 26.58 KG/M2

## 2024-08-19 DIAGNOSIS — R53.83 MALAISE AND FATIGUE: ICD-10-CM

## 2024-08-19 DIAGNOSIS — R42 VERTIGO: Primary | ICD-10-CM

## 2024-08-19 DIAGNOSIS — R53.81 MALAISE AND FATIGUE: ICD-10-CM

## 2024-08-19 LAB
ALBUMIN SERPL-MCNC: 3.3 G/DL (ref 3.2–4.6)
ALBUMIN/GLOB SERPL: 1.1 (ref 1–1.9)
ALP SERPL-CCNC: 85 U/L (ref 40–129)
ALT SERPL-CCNC: 23 U/L (ref 12–65)
ANION GAP SERPL CALC-SCNC: 13 MMOL/L (ref 9–18)
AST SERPL-CCNC: 22 U/L (ref 15–37)
BACTERIA URNS QL MICRO: NEGATIVE /HPF
BILIRUB SERPL-MCNC: 0.5 MG/DL (ref 0–1.2)
BILIRUB UR QL: NEGATIVE
BUN SERPL-MCNC: 19 MG/DL (ref 8–23)
CALCIUM SERPL-MCNC: 9.1 MG/DL (ref 8.8–10.2)
CHLORIDE SERPL-SCNC: 99 MMOL/L (ref 98–107)
CO2 SERPL-SCNC: 23 MMOL/L (ref 20–28)
CREAT SERPL-MCNC: 1.57 MG/DL (ref 0.8–1.3)
EPI CELLS #/AREA URNS HPF: NORMAL /HPF
ERYTHROCYTE [DISTWIDTH] IN BLOOD BY AUTOMATED COUNT: 14.5 % (ref 11.9–14.6)
GLOBULIN SER CALC-MCNC: 3 G/DL (ref 2.3–3.5)
GLUCOSE SERPL-MCNC: 220 MG/DL (ref 70–99)
GLUCOSE UR QL STRIP.AUTO: NEGATIVE MG/DL
HCT VFR BLD AUTO: 44.7 % (ref 41.1–50.3)
HGB BLD-MCNC: 14.1 G/DL (ref 13.6–17.2)
HYALINE CASTS URNS QL MICRO: NORMAL /LPF
KETONES UR-MCNC: NEGATIVE MG/DL
LEUKOCYTE ESTERASE UR QL STRIP: NEGATIVE
LIPASE SERPL-CCNC: 6 U/L (ref 13–60)
MAGNESIUM SERPL-MCNC: 2.2 MG/DL (ref 1.8–2.4)
MCH RBC QN AUTO: 27.4 PG (ref 26.1–32.9)
MCHC RBC AUTO-ENTMCNC: 31.5 G/DL (ref 31.4–35)
MCV RBC AUTO: 86.8 FL (ref 82–102)
NITRITE UR QL: NEGATIVE
NRBC # BLD: 0 K/UL (ref 0–0.2)
PH UR: 6.5 (ref 5–9)
PLATELET # BLD AUTO: 318 K/UL (ref 150–450)
PMV BLD AUTO: 9.7 FL (ref 9.4–12.3)
POTASSIUM SERPL-SCNC: 4.8 MMOL/L (ref 3.5–5.1)
PROT SERPL-MCNC: 6.3 G/DL (ref 6.3–8.2)
PROT UR QL: NEGATIVE MG/DL
RBC # BLD AUTO: 5.15 M/UL (ref 4.23–5.6)
RBC # UR STRIP: ABNORMAL
RBC #/AREA URNS HPF: NORMAL /HPF
SERVICE CMNT-IMP: ABNORMAL
SODIUM SERPL-SCNC: 135 MMOL/L (ref 136–145)
SP GR UR: 1.01 (ref 1–1.02)
TSH W FREE THYROID IF ABNORMAL: 2.18 UIU/ML (ref 0.27–4.2)
UROBILINOGEN UR QL: 0.2 EU/DL (ref 0.2–1)
WBC # BLD AUTO: 6 K/UL (ref 4.3–11.1)
WBC URNS QL MICRO: NORMAL /HPF

## 2024-08-19 PROCEDURE — 2580000003 HC RX 258: Performed by: EMERGENCY MEDICINE

## 2024-08-19 PROCEDURE — 70450 CT HEAD/BRAIN W/O DYE: CPT

## 2024-08-19 PROCEDURE — 81001 URINALYSIS AUTO W/SCOPE: CPT

## 2024-08-19 PROCEDURE — 96361 HYDRATE IV INFUSION ADD-ON: CPT

## 2024-08-19 PROCEDURE — 71045 X-RAY EXAM CHEST 1 VIEW: CPT

## 2024-08-19 PROCEDURE — 84443 ASSAY THYROID STIM HORMONE: CPT

## 2024-08-19 PROCEDURE — 83690 ASSAY OF LIPASE: CPT

## 2024-08-19 PROCEDURE — 83735 ASSAY OF MAGNESIUM: CPT

## 2024-08-19 PROCEDURE — 93005 ELECTROCARDIOGRAM TRACING: CPT | Performed by: EMERGENCY MEDICINE

## 2024-08-19 PROCEDURE — 96360 HYDRATION IV INFUSION INIT: CPT

## 2024-08-19 PROCEDURE — 80053 COMPREHEN METABOLIC PANEL: CPT

## 2024-08-19 PROCEDURE — 85027 COMPLETE CBC AUTOMATED: CPT

## 2024-08-19 PROCEDURE — 99285 EMERGENCY DEPT VISIT HI MDM: CPT

## 2024-08-19 RX ORDER — 0.9 % SODIUM CHLORIDE 0.9 %
500 INTRAVENOUS SOLUTION INTRAVENOUS
Status: COMPLETED | OUTPATIENT
Start: 2024-08-19 | End: 2024-08-19

## 2024-08-19 RX ORDER — METOCLOPRAMIDE 5 MG/1
2.5 TABLET ORAL EVERY 6 HOURS PRN
Qty: 19 TABLET | Refills: 0 | Status: SHIPPED | OUTPATIENT
Start: 2024-08-19

## 2024-08-19 RX ADMIN — SODIUM CHLORIDE 500 ML: 9 INJECTION, SOLUTION INTRAVENOUS at 15:07

## 2024-08-19 ASSESSMENT — LIFESTYLE VARIABLES
HOW OFTEN DO YOU HAVE A DRINK CONTAINING ALCOHOL: NEVER
HOW MANY STANDARD DRINKS CONTAINING ALCOHOL DO YOU HAVE ON A TYPICAL DAY: PATIENT DOES NOT DRINK

## 2024-08-19 ASSESSMENT — ENCOUNTER SYMPTOMS
SHORTNESS OF BREATH: 0
NAUSEA: 1
ABDOMINAL PAIN: 0
EYE ITCHING: 0
EYE DISCHARGE: 0
VOMITING: 0
CHEST TIGHTNESS: 0
DIARRHEA: 0
COUGH: 0

## 2024-08-19 NOTE — ED TRIAGE NOTES
Pt arrives to the ER with c/o nausea, dizziness, h/a, generalized extremity weakness x 2 months. Pt states that symptoms have increased today.

## 2024-08-19 NOTE — ED PROVIDER NOTES
Emergency Department Provider Note       PCP: Manjinder Vale MD   Age: 89 y.o.   Sex: male     DISPOSITION Decision To Discharge 08/19/2024 05:02:23 PM  Condition at Disposition: Stable       ICD-10-CM    1. Vertigo  R42       2. Malaise and fatigue  R53.81     R53.83           Medical Decision Making     89-year-old male patient presents to the ER with generalized weakness malaise and fatigue  Complicated history of Ménière's vestibular migraines  Telemedicine visit this morning also raise concern for adjustment disorder related to his multiple medical problems  Workup today was unremarkable patient is feeling somewhat better with fluids  Will start Reglan at home  Advise close follow-up with primary care and ENT  Return precautions discussed with patient and family     1 chronic illness with exacerbation.  Prescription drug management performed.  Patient was discharged risks and benefits of hospitalization were considered.  Chronic medical problems impacting care include Ménière's, vestibular migraines hypertension CAD.  Shared medical decision making was utilized in creating the patients health plan today.    I independently ordered and reviewed each unique test.  I reviewed external records: provider visit note from PCP.  I reviewed external records: provider visit note from outside specialist.   The patients assessment required an independent historian: Family members at bedside.  The reason they were needed is important historical information not provided by the patient.  I interpreted the X-rays chest x-ray reveals no evidence of acute cardiopulmonary disease.  Confirmed with radiologist report.  I interpreted the CT Scan CT brain reveals microangiopathic disease but no evidence of acute stroke or other acute abnormalities.  Confirmed with radiologist report.              History     89-year-old male patient presents to the ER with complaints of dizziness nausea fatigue and generalized weakness  Symptoms

## 2024-08-19 NOTE — DISCHARGE INSTRUCTIONS
Continue all your current medications  Start Reglan as needed for nausea  Okay to continue Zofran    Push fluids  Eat small frequent meals    Call your doctor or the follow up doctor to set up appointment for recheck visit  Recheck with your ENT doctor    Return to ER for any worsening symptoms or new problems which may arise

## 2024-08-20 LAB
EKG ATRIAL RATE: 70 BPM
EKG DIAGNOSIS: NORMAL
EKG P AXIS: 63 DEGREES
EKG P-R INTERVAL: 200 MS
EKG Q-T INTERVAL: 386 MS
EKG QRS DURATION: 102 MS
EKG QTC CALCULATION (BAZETT): 416 MS
EKG R AXIS: 68 DEGREES
EKG T AXIS: 72 DEGREES
EKG VENTRICULAR RATE: 70 BPM

## 2024-08-24 DIAGNOSIS — N40.1 BENIGN PROSTATIC HYPERPLASIA WITH URINARY FREQUENCY: ICD-10-CM

## 2024-08-24 DIAGNOSIS — R35.0 BENIGN PROSTATIC HYPERPLASIA WITH URINARY FREQUENCY: ICD-10-CM

## 2024-08-26 RX ORDER — TAMSULOSIN HYDROCHLORIDE 0.4 MG/1
CAPSULE ORAL DAILY
Qty: 90 CAPSULE | Refills: 3 | Status: SHIPPED | OUTPATIENT
Start: 2024-08-26

## 2024-09-15 ENCOUNTER — HOSPITAL ENCOUNTER (EMERGENCY)
Age: 89
Discharge: HOME | End: 2024-09-15
Attending: EMERGENCY MEDICINE
Payer: MEDICARE

## 2024-09-15 ENCOUNTER — APPOINTMENT (OUTPATIENT)
Dept: GENERAL RADIOLOGY | Age: 89
End: 2024-09-15
Payer: MEDICARE

## 2024-09-15 VITALS
OXYGEN SATURATION: 96 % | WEIGHT: 180 LBS | RESPIRATION RATE: 20 BRPM | HEIGHT: 69 IN | DIASTOLIC BLOOD PRESSURE: 85 MMHG | HEART RATE: 76 BPM | SYSTOLIC BLOOD PRESSURE: 128 MMHG | BODY MASS INDEX: 26.66 KG/M2 | TEMPERATURE: 97.8 F

## 2024-09-15 DIAGNOSIS — R33.9 URINARY RETENTION: ICD-10-CM

## 2024-09-15 DIAGNOSIS — K59.00 CONSTIPATION, UNSPECIFIED CONSTIPATION TYPE: Primary | ICD-10-CM

## 2024-09-15 LAB
ALBUMIN SERPL-MCNC: 3.6 G/DL (ref 3.2–4.6)
ALBUMIN/GLOB SERPL: 1.2 (ref 1–1.9)
ALP SERPL-CCNC: 85 U/L (ref 40–129)
ALT SERPL-CCNC: 23 U/L (ref 12–65)
ANION GAP SERPL CALC-SCNC: 15 MMOL/L (ref 9–18)
APPEARANCE UR: CLEAR
AST SERPL-CCNC: 23 U/L (ref 15–37)
BASOPHILS # BLD: 0.1 K/UL (ref 0–0.2)
BASOPHILS NFR BLD: 1 % (ref 0–2)
BILIRUB SERPL-MCNC: 0.7 MG/DL (ref 0–1.2)
BILIRUB UR QL: NEGATIVE
BILIRUB UR QL: NEGATIVE
BUN SERPL-MCNC: 22 MG/DL (ref 8–23)
CALCIUM SERPL-MCNC: 9.6 MG/DL (ref 8.8–10.2)
CHLORIDE SERPL-SCNC: 95 MMOL/L (ref 98–107)
CO2 SERPL-SCNC: 20 MMOL/L (ref 20–28)
COLOR UR: NORMAL
CREAT SERPL-MCNC: 1.55 MG/DL (ref 0.8–1.3)
DIFFERENTIAL METHOD BLD: NORMAL
EOSINOPHIL # BLD: 0.1 K/UL (ref 0–0.8)
EOSINOPHIL NFR BLD: 2 % (ref 0.5–7.8)
ERYTHROCYTE [DISTWIDTH] IN BLOOD BY AUTOMATED COUNT: 13.7 % (ref 11.9–14.6)
GLOBULIN SER CALC-MCNC: 2.9 G/DL (ref 2.3–3.5)
GLUCOSE SERPL-MCNC: 166 MG/DL (ref 70–99)
GLUCOSE UR QL STRIP.AUTO: NEGATIVE MG/DL
GLUCOSE UR STRIP.AUTO-MCNC: NEGATIVE MG/DL
HCT VFR BLD AUTO: 46 % (ref 41.1–50.3)
HGB BLD-MCNC: 15.5 G/DL (ref 13.6–17.2)
HGB UR QL STRIP: NEGATIVE
IMM GRANULOCYTES # BLD AUTO: 0 K/UL (ref 0–0.5)
IMM GRANULOCYTES NFR BLD AUTO: 0 % (ref 0–5)
KETONES UR QL STRIP.AUTO: NEGATIVE MG/DL
KETONES UR-MCNC: NEGATIVE MG/DL
LEUKOCYTE ESTERASE UR QL STRIP.AUTO: NEGATIVE
LEUKOCYTE ESTERASE UR QL STRIP: NEGATIVE
LIPASE SERPL-CCNC: 5 U/L (ref 13–60)
LYMPHOCYTES # BLD: 1.4 K/UL (ref 0.5–4.6)
LYMPHOCYTES NFR BLD: 21 % (ref 13–44)
MCH RBC QN AUTO: 28 PG (ref 26.1–32.9)
MCHC RBC AUTO-ENTMCNC: 33.7 G/DL (ref 31.4–35)
MCV RBC AUTO: 83 FL (ref 82–102)
MONOCYTES # BLD: 0.5 K/UL (ref 0.1–1.3)
MONOCYTES NFR BLD: 8 % (ref 4–12)
NEUTS SEG # BLD: 4.8 K/UL (ref 1.7–8.2)
NEUTS SEG NFR BLD: 69 % (ref 43–78)
NITRITE UR QL STRIP.AUTO: NEGATIVE
NITRITE UR QL: NEGATIVE
NRBC # BLD: 0 K/UL (ref 0–0.2)
PH UR STRIP: 6 (ref 5–9)
PH UR: 6 (ref 5–9)
PLATELET # BLD AUTO: 281 K/UL (ref 150–450)
PMV BLD AUTO: 9.6 FL (ref 9.4–12.3)
POTASSIUM SERPL-SCNC: 4.6 MMOL/L (ref 3.5–5.1)
PROT SERPL-MCNC: 6.4 G/DL (ref 6.3–8.2)
PROT UR QL: NEGATIVE MG/DL
PROT UR STRIP-MCNC: NEGATIVE MG/DL
RBC # BLD AUTO: 5.54 M/UL (ref 4.23–5.6)
RBC # UR STRIP: ABNORMAL
SERVICE CMNT-IMP: ABNORMAL
SODIUM SERPL-SCNC: 130 MMOL/L (ref 136–145)
SP GR UR REFRACTOMETRY: 1.01 (ref 1–1.02)
SP GR UR: <1.005 (ref 1–1.02)
UROBILINOGEN UR QL STRIP.AUTO: 0.2 EU/DL (ref 0.2–1)
UROBILINOGEN UR QL: 0.2 EU/DL (ref 0.2–1)
WBC # BLD AUTO: 7 K/UL (ref 4.3–11.1)

## 2024-09-15 PROCEDURE — 51798 US URINE CAPACITY MEASURE: CPT

## 2024-09-15 PROCEDURE — 81003 URINALYSIS AUTO W/O SCOPE: CPT

## 2024-09-15 PROCEDURE — 74018 RADEX ABDOMEN 1 VIEW: CPT

## 2024-09-15 PROCEDURE — 83690 ASSAY OF LIPASE: CPT

## 2024-09-15 PROCEDURE — 99284 EMERGENCY DEPT VISIT MOD MDM: CPT

## 2024-09-15 PROCEDURE — 80053 COMPREHEN METABOLIC PANEL: CPT

## 2024-09-15 PROCEDURE — 85025 COMPLETE CBC W/AUTO DIFF WBC: CPT

## 2024-09-15 ASSESSMENT — PAIN - FUNCTIONAL ASSESSMENT: PAIN_FUNCTIONAL_ASSESSMENT: NONE - DENIES PAIN

## 2024-09-16 ENCOUNTER — HOSPITAL ENCOUNTER (EMERGENCY)
Age: 89
Discharge: HOME | End: 2024-09-16
Payer: MEDICARE

## 2024-09-16 VITALS
SYSTOLIC BLOOD PRESSURE: 114 MMHG | RESPIRATION RATE: 15 BRPM | DIASTOLIC BLOOD PRESSURE: 74 MMHG | OXYGEN SATURATION: 98 % | HEART RATE: 99 BPM | TEMPERATURE: 98.1 F

## 2024-09-16 DIAGNOSIS — Z46.6 ENCOUNTER FOR FOLEY CATHETER REMOVAL: Primary | ICD-10-CM

## 2024-09-16 PROCEDURE — 99282 EMERGENCY DEPT VISIT SF MDM: CPT

## 2024-10-16 ENCOUNTER — OFFICE VISIT (OUTPATIENT)
Dept: UROLOGY | Age: 89
End: 2024-10-16
Payer: MEDICARE

## 2024-10-16 DIAGNOSIS — R35.0 BENIGN PROSTATIC HYPERPLASIA WITH URINARY FREQUENCY: ICD-10-CM

## 2024-10-16 DIAGNOSIS — R33.9 URINE RETENTION: Primary | ICD-10-CM

## 2024-10-16 DIAGNOSIS — N40.1 BENIGN PROSTATIC HYPERPLASIA WITH URINARY FREQUENCY: ICD-10-CM

## 2024-10-16 LAB
BILIRUBIN, URINE, POC: NEGATIVE
BLOOD URINE, POC: NORMAL
GLUCOSE URINE, POC: NEGATIVE MG/DL
KETONES, URINE, POC: NEGATIVE MG/DL
LEUKOCYTE ESTERASE, URINE, POC: NEGATIVE
NITRITE, URINE, POC: NEGATIVE
PH, URINE, POC: 5.5 (ref 4.6–8)
PROTEIN,URINE, POC: NEGATIVE MG/DL
PVR, POC: 59 CC
SPECIFIC GRAVITY, URINE, POC: 1.02 (ref 1–1.03)
URINALYSIS CLARITY, POC: NORMAL
URINALYSIS COLOR, POC: NORMAL
UROBILINOGEN, POC: NORMAL MG/DL

## 2024-10-16 PROCEDURE — 99214 OFFICE O/P EST MOD 30 MIN: CPT | Performed by: NURSE PRACTITIONER

## 2024-10-16 PROCEDURE — G8427 DOCREV CUR MEDS BY ELIG CLIN: HCPCS | Performed by: NURSE PRACTITIONER

## 2024-10-16 PROCEDURE — G8417 CALC BMI ABV UP PARAM F/U: HCPCS | Performed by: NURSE PRACTITIONER

## 2024-10-16 PROCEDURE — 51798 US URINE CAPACITY MEASURE: CPT | Performed by: NURSE PRACTITIONER

## 2024-10-16 PROCEDURE — 1123F ACP DISCUSS/DSCN MKR DOCD: CPT | Performed by: NURSE PRACTITIONER

## 2024-10-16 PROCEDURE — 1036F TOBACCO NON-USER: CPT | Performed by: NURSE PRACTITIONER

## 2024-10-16 PROCEDURE — 81003 URINALYSIS AUTO W/O SCOPE: CPT | Performed by: NURSE PRACTITIONER

## 2024-10-16 PROCEDURE — G8484 FLU IMMUNIZE NO ADMIN: HCPCS | Performed by: NURSE PRACTITIONER

## 2024-10-16 ASSESSMENT — ENCOUNTER SYMPTOMS: BACK PAIN: 0

## 2024-10-16 NOTE — PROGRESS NOTES
on file    Highest education level: Not on file   Occupational History    Not on file   Tobacco Use    Smoking status: Never     Passive exposure: Never    Smokeless tobacco: Never   Substance and Sexual Activity    Alcohol use: No    Drug use: No    Sexual activity: Not on file   Other Topics Concern    Not on file   Social History Narrative    Not on file     Social Determinants of Health     Financial Resource Strain: Low Risk  (12/20/2023)    Received from [email or web address]    Financial Resource Strain     Difficulty Paying Living Expenses: Not hard at all     Difficulty Paying Medical Expenses: No   Food Insecurity: No Food Insecurity (12/20/2023)    Received from [employer]    Food Insecurity     Worried about Running Out of Food in the Last Year: Never true     Ran Out of Food in the Last Year: Never true   Transportation Needs: No Transportation Needs (12/20/2023)    Received from [employer]    Transportation Needs     Lack of Transportation: No   Physical Activity: Insufficiently Active (12/20/2023)    Received from [employer]    Physical Activity     Days of Exercise per Week: 6     Minutes of Exercise per Session: 20     Total Minutes of Exercise per Week: 120   Stress: No Stress Concern Present (12/20/2023)    Received from [employer]    Stress     Feeling of Stress : Not at all   Social Connections: Socially Integrated (12/20/2023)    Received from [employer]    Social Connections     Frequency of Communication with Friends and Family: More than three times a week     Frequency of Social Gatherings with Friends and Family: More than three times a week   Intimate Partner Violence: Unknown (12/14/2023)    Received from [employer]    Intimate Partner Violence     Fear of Current or Ex-Partner: Not asked     Emotionally Abused: Not asked     Physically Abused: Not asked     Sexually Abused:

## 2024-10-25 ENCOUNTER — OFFICE VISIT (OUTPATIENT)
Age: 89
End: 2024-10-25

## 2024-10-25 VITALS
WEIGHT: 164 LBS | SYSTOLIC BLOOD PRESSURE: 122 MMHG | HEIGHT: 69 IN | BODY MASS INDEX: 24.29 KG/M2 | DIASTOLIC BLOOD PRESSURE: 64 MMHG | HEART RATE: 78 BPM

## 2024-10-25 DIAGNOSIS — I25.10 CORONARY ARTERY DISEASE INVOLVING NATIVE CORONARY ARTERY OF NATIVE HEART WITHOUT ANGINA PECTORIS: Primary | ICD-10-CM

## 2024-10-25 DIAGNOSIS — E78.2 MIXED HYPERLIPIDEMIA: ICD-10-CM

## 2024-10-25 DIAGNOSIS — I10 ESSENTIAL HYPERTENSION WITH GOAL BLOOD PRESSURE LESS THAN 130/85: ICD-10-CM

## 2024-10-25 ASSESSMENT — ENCOUNTER SYMPTOMS
ABDOMINAL PAIN: 0
BLURRED VISION: 0
SHORTNESS OF BREATH: 0
ORTHOPNEA: 0
DIARRHEA: 0
HEMATOCHEZIA: 0
COLOR CHANGE: 0
HOARSE VOICE: 0
BOWEL INCONTINENCE: 0
SPUTUM PRODUCTION: 0
WHEEZING: 0
CHEST TIGHTNESS: 0
HEMATEMESIS: 0

## 2024-10-25 NOTE — PROGRESS NOTES
EF on Stress test 71%    Hypercholesterolemia     takes statin and fish oil    Hyperlipidemia     takes statin and fish oil    Hypertrophy of both inferior nasal turbinates     Kidney stone     Liver disease     going for MRI; recent CT showed mass on liver    Liver mass 4/16/2021    MHL (mixed hearing loss)     NO (nasal obstruction)     Otitis media, nonsuppurative     SNHL (sensorineural hearing loss) 8/3/2016    Squamous cell carcinoma, arm     left forearm near wrist; several other sites as well    Type 2 diabetes mellitus (HCC)     insulin reliant/AVG FBS: 100-120/s.s of hypoglycemia at 70/last A1c:6.6    Unspecified sleep apnea     no cpap; patient states he lost weight and does not have ASHLEY anymore    Vertigo      Past Surgical History:   Procedure Laterality Date    APPENDECTOMY      CATARACT REMOVAL Bilateral     COLONOSCOPY      CORONARY ANGIOPLASTY WITH STENT PLACEMENT  2003    stent RCA 2003    CYSTOSCOPY,INSERT URETERAL STENT Right 03/05/2021    ERCP  12/2015    ENDOSCOPIC RETROGRADE CHOLANGIOPANCREATOGRAPHY (N/A Upper GI Region) ENDOSCOPIC SPHINCTEROTOMY (N/A Upper GI Region) ENDOSCOPIC STONE EXTRACTION/BALLOON SWEEP (N/A Upper GI Region)    HERNIA REPAIR Left 2014    LIH with mesh    HERNIA REPAIR Bilateral 1980s    MASTOIDECTOMY  1996    right modified canal wall down    ORTHOPEDIC SURGERY Right 1/14/03    Dupuytrens contracture release    OTHER SURGICAL HISTORY      skin lesion; local excision: SCC    TURP      TURP      TYMPANOSTOMY TUBE PLACEMENT Right     tube right ear    UPPER GASTROINTESTINAL ENDOSCOPY  03/11/2021    US GUIDED LIVER BIOPSY PERCUTANEOUS  4/16/2021    US GUIDED LIVER BIOPSY PERCUTANEOUS 4/16/2021 [facility] US    VITRECTOMY       Family History   Problem Relation Age of Onset    Cancer Maternal Grandmother         colon    Heart Disease Maternal Grandfather       Social History     Tobacco Use    Smoking status: Never     Passive exposure: Never    Smokeless tobacco: Never

## 2024-11-12 ENCOUNTER — TELEPHONE (OUTPATIENT)
Age: 89
End: 2024-11-12

## 2024-11-12 NOTE — TELEPHONE ENCOUNTER
Gary Coffey MD Keener, Lynn F, RN  Caller: Unspecified (Today,  9:46 AM)  CV status is stable.Ok to hold ASA if necessary

## 2024-11-12 NOTE — TELEPHONE ENCOUNTER
Cardiac Clearance        Physician or Practice Requesting:[facility]  : Lauren  Contact Phone Number: [phone]  Fax Number: [phone]  Date of Surgery/Procedure: 11/15/24  Type of Surgery or Procedure: Oculoplastics surgery  Type of Anesthesia: MAC  Type of Clearance Requested: Cardiac Clearance and Medication Hold  Medication to Hold:Aspirin 81 mg  Days to Hold: ?

## 2024-11-12 NOTE — TELEPHONE ENCOUNTER
Per medical record, last appointment with Dr. Coffey was 10/25/24. History includes CAD, essential HTN, and mixed hyperlipidemia. S/P RCA sent by Dr. Gómez in 2003. Last nuclear stress test was 2/25/19.

## 2025-01-15 ENCOUNTER — HOSPITAL ENCOUNTER (OUTPATIENT)
Dept: ULTRASOUND IMAGING | Age: 89
Discharge: HOME | End: 2025-01-18
Attending: INTERNAL MEDICINE
Payer: MEDICARE

## 2025-01-15 DIAGNOSIS — Z85.05 HISTORY OF HEPATOCELLULAR CARCINOMA: ICD-10-CM

## 2025-01-15 DIAGNOSIS — C22.0 HEPATOCELLULAR CARCINOMA (HCC): ICD-10-CM

## 2025-01-15 DIAGNOSIS — R91.1 SOLITARY PULMONARY NODULE: ICD-10-CM

## 2025-01-15 DIAGNOSIS — R79.89 ELEVATED SERUM CREATININE: ICD-10-CM

## 2025-01-15 DIAGNOSIS — F40.240 CLAUSTROPHOBIA: ICD-10-CM

## 2025-01-15 PROCEDURE — 76700 US EXAM ABDOM COMPLETE: CPT

## 2025-01-21 ENCOUNTER — HOSPITAL ENCOUNTER (OUTPATIENT)
Dept: LAB | Age: 89
Discharge: HOME | End: 2025-01-21
Payer: MEDICARE

## 2025-01-21 ENCOUNTER — OFFICE VISIT (OUTPATIENT)
Dept: ONCOLOGY | Age: 89
End: 2025-01-21
Payer: MEDICARE

## 2025-01-21 VITALS
SYSTOLIC BLOOD PRESSURE: 108 MMHG | DIASTOLIC BLOOD PRESSURE: 65 MMHG | BODY MASS INDEX: 22.29 KG/M2 | HEART RATE: 77 BPM | OXYGEN SATURATION: 98 % | WEIGHT: 150.5 LBS | RESPIRATION RATE: 18 BRPM | HEIGHT: 69 IN | TEMPERATURE: 97.7 F

## 2025-01-21 DIAGNOSIS — R91.1 SOLITARY PULMONARY NODULE: ICD-10-CM

## 2025-01-21 DIAGNOSIS — B18.2 CHRONIC HEPATITIS C WITHOUT HEPATIC COMA (HCC): ICD-10-CM

## 2025-01-21 DIAGNOSIS — R63.4 WEIGHT LOSS: ICD-10-CM

## 2025-01-21 DIAGNOSIS — R91.8 LUNG MASS: ICD-10-CM

## 2025-01-21 DIAGNOSIS — R79.89 ELEVATED SERUM CREATININE: ICD-10-CM

## 2025-01-21 DIAGNOSIS — R53.81 DEBILITY: ICD-10-CM

## 2025-01-21 DIAGNOSIS — Z85.05 HISTORY OF HEPATOCELLULAR CARCINOMA: ICD-10-CM

## 2025-01-21 DIAGNOSIS — Z85.05 HISTORY OF HEPATOCELLULAR CARCINOMA: Primary | ICD-10-CM

## 2025-01-21 DIAGNOSIS — C22.0 HEPATOCELLULAR CARCINOMA (HCC): ICD-10-CM

## 2025-01-21 DIAGNOSIS — R91.8 RIGHT LOWER LOBE LUNG MASS: ICD-10-CM

## 2025-01-21 LAB
AFP-TM SERPL-MCNC: 9.72 NG/ML (ref 0–8.3)
ALBUMIN SERPL-MCNC: 3.4 G/DL (ref 3.2–4.6)
ALBUMIN/GLOB SERPL: 1 (ref 1–1.9)
ALP SERPL-CCNC: 126 U/L (ref 40–129)
ALT SERPL-CCNC: 46 U/L (ref 8–55)
ANION GAP SERPL CALC-SCNC: 12 MMOL/L (ref 7–16)
AST SERPL-CCNC: 34 U/L (ref 15–37)
BASOPHILS # BLD: 0.06 K/UL (ref 0–0.2)
BASOPHILS NFR BLD: 0.8 % (ref 0–2)
BILIRUB SERPL-MCNC: 0.6 MG/DL (ref 0–1.2)
BUN SERPL-MCNC: 16 MG/DL (ref 8–23)
CALCIUM SERPL-MCNC: 9.9 MG/DL (ref 8.8–10.2)
CHLORIDE SERPL-SCNC: 100 MMOL/L (ref 98–107)
CO2 SERPL-SCNC: 25 MMOL/L (ref 20–29)
CREAT SERPL-MCNC: 1.25 MG/DL (ref 0.8–1.3)
DIFFERENTIAL METHOD BLD: NORMAL
EOSINOPHIL # BLD: 0.15 K/UL (ref 0–0.8)
EOSINOPHIL NFR BLD: 1.9 % (ref 0.5–7.8)
ERYTHROCYTE [DISTWIDTH] IN BLOOD BY AUTOMATED COUNT: 13.6 % (ref 11.9–14.6)
GLOBULIN SER CALC-MCNC: 3.5 G/DL (ref 2.3–3.5)
GLUCOSE SERPL-MCNC: 163 MG/DL (ref 70–99)
HCT VFR BLD AUTO: 44.8 % (ref 41.1–50.3)
HGB BLD-MCNC: 14.4 G/DL (ref 13.6–17.2)
IMM GRANULOCYTES # BLD AUTO: 0.02 K/UL (ref 0–0.5)
IMM GRANULOCYTES NFR BLD AUTO: 0.3 % (ref 0–5)
LYMPHOCYTES # BLD: 1.13 K/UL (ref 0.5–4.6)
LYMPHOCYTES NFR BLD: 14.2 % (ref 13–44)
MCH RBC QN AUTO: 28.1 PG (ref 26.1–32.9)
MCHC RBC AUTO-ENTMCNC: 32.1 G/DL (ref 31.4–35)
MCV RBC AUTO: 87.3 FL (ref 82–102)
MONOCYTES # BLD: 0.47 K/UL (ref 0.1–1.3)
MONOCYTES NFR BLD: 5.9 % (ref 4–12)
NEUTS SEG # BLD: 6.13 K/UL (ref 1.7–8.2)
NEUTS SEG NFR BLD: 76.9 % (ref 43–78)
NRBC # BLD: 0 K/UL (ref 0–0.2)
PLATELET # BLD AUTO: 297 K/UL (ref 150–450)
PMV BLD AUTO: 9.4 FL (ref 9.4–12.3)
POTASSIUM SERPL-SCNC: 4.7 MMOL/L (ref 3.5–5.1)
PROT SERPL-MCNC: 6.8 G/DL (ref 6.3–8.2)
RBC # BLD AUTO: 5.13 M/UL (ref 4.23–5.6)
SODIUM SERPL-SCNC: 137 MMOL/L (ref 136–145)
WBC # BLD AUTO: 8 K/UL (ref 4.3–11.1)

## 2025-01-21 PROCEDURE — 82105 ALPHA-FETOPROTEIN SERUM: CPT

## 2025-01-21 PROCEDURE — 80053 COMPREHEN METABOLIC PANEL: CPT

## 2025-01-21 PROCEDURE — 85025 COMPLETE CBC W/AUTO DIFF WBC: CPT

## 2025-01-21 PROCEDURE — 1123F ACP DISCUSS/DSCN MKR DOCD: CPT | Performed by: INTERNAL MEDICINE

## 2025-01-21 PROCEDURE — 99214 OFFICE O/P EST MOD 30 MIN: CPT | Performed by: INTERNAL MEDICINE

## 2025-01-21 PROCEDURE — 1126F AMNT PAIN NOTED NONE PRSNT: CPT | Performed by: INTERNAL MEDICINE

## 2025-01-21 PROCEDURE — G8420 CALC BMI NORM PARAMETERS: HCPCS | Performed by: INTERNAL MEDICINE

## 2025-01-21 PROCEDURE — 1160F RVW MEDS BY RX/DR IN RCRD: CPT | Performed by: INTERNAL MEDICINE

## 2025-01-21 PROCEDURE — 36415 COLL VENOUS BLD VENIPUNCTURE: CPT

## 2025-01-21 PROCEDURE — G8427 DOCREV CUR MEDS BY ELIG CLIN: HCPCS | Performed by: INTERNAL MEDICINE

## 2025-01-21 PROCEDURE — 1159F MED LIST DOCD IN RCRD: CPT | Performed by: INTERNAL MEDICINE

## 2025-01-21 PROCEDURE — 1036F TOBACCO NON-USER: CPT | Performed by: INTERNAL MEDICINE

## 2025-01-21 NOTE — PATIENT INSTRUCTIONS
Please refer to After Visit Summary or [employer] for upcoming appointment information. Please call our office for rescheduling needs at least 24 hours before your scheduled appointment time.If you have any questions regarding your upcoming schedule please reach out to your care team through [employer] or call [phone].     Please notify your assigned Nurse Navigator of any unplanned hospital admissions or Emergency Room visits within 24 hours of discharge.    -------------------------------------------------------------------------------------------------------------------  Please call our office at [phone] if you have any  of the following symptoms:   Fever of 100.5 or greater  Chills  Shortness of breath  Swelling or pain in one leg    After office hours an answering service is available and will contact a provider for emergencies or if you are experiencing any of the above symptoms.  KRISTIN SERNA RN

## 2025-01-21 NOTE — PROGRESS NOTES
[facility]: Office Visit Progress Note      Chief Complaint:    HCC      History of Present Illness:  89 y.o. M admitted on 3/5/21 with a right ureteral stone.  He has a PMH of BPH, CAD, BPH, diverticulitis, and previous  TURP 20 years ago.  He presented to the ER on 3/2 with severe right flank pain.  He had a CT urogram on 3/2 which showed an obstructing 4 mm calculus in the proximal right ureter resulting in mild right hydroureteronephrosis with inflammatory stranding,  new heterogenous liver mass up to 6.4 cm, chronic pancreatitis, and prostatomegaly with urinary bladder wall thickening.          He had a cytoscopy on 3/5 and a right ureter stent placed with cooper insertion over wire.  He was noticed to have an increase in Cr (2x BL) at 3.25, now improved today to 2.63.  We were consulted given the new liver mass for our recommendations.                He received segment 6 liver resection with Dr. Duff on 5/12/2021:           Interim history update in A/P.      Review of Systems:      Constitutional   fatigue.  Denies fever or chills.  Denies weight loss or appetite changes. Denies anorexia.        HEENT  Denies trauma, bluring vision, ear pain, nosebleeds, sore throat, neck pain and ear discharge.    Difficulty hearing      Skin  Denies lesions or rashes.   Hx of skin cancer     Lungs  Denies shortness of breath, cough, sputum production or hemoptysis.     Cardiovascular  Denies chest pain, palpitations, orthopnea, claudication and leg swelling.     Gastrointestinal  Denies nausea, vomiting, bowel changes.  Denies bloody or black stools. Denies abdominal pain.       Denies dysuria, frequency or hesitancy of urination   Waking to urinate 2x/night      Neuro  Denies visual changes or ataxia. Denies dizziness, tingling, tremors, sensory change, speech change, focal weakness and headaches.     Hematology  Denies nasal/gum bleeding, denies easy bruise     Endo  Denies heat/cold intolerance

## 2025-03-24 DIAGNOSIS — N40.1 BENIGN PROSTATIC HYPERPLASIA WITH URINARY FREQUENCY: ICD-10-CM

## 2025-03-24 DIAGNOSIS — R35.0 BENIGN PROSTATIC HYPERPLASIA WITH URINARY FREQUENCY: ICD-10-CM

## 2025-03-24 RX ORDER — TAMSULOSIN HYDROCHLORIDE 0.4 MG/1
0.4 CAPSULE ORAL DAILY
Qty: 90 CAPSULE | Refills: 3 | Status: SHIPPED | OUTPATIENT
Start: 2025-03-24

## 2025-05-05 ENCOUNTER — OFFICE VISIT (OUTPATIENT)
Age: 89
End: 2025-05-05
Payer: MEDICARE

## 2025-05-05 VITALS
HEART RATE: 68 BPM | HEIGHT: 69 IN | SYSTOLIC BLOOD PRESSURE: 118 MMHG | DIASTOLIC BLOOD PRESSURE: 62 MMHG | WEIGHT: 148 LBS | BODY MASS INDEX: 21.92 KG/M2

## 2025-05-05 DIAGNOSIS — I10 ESSENTIAL HYPERTENSION WITH GOAL BLOOD PRESSURE LESS THAN 130/85: ICD-10-CM

## 2025-05-05 DIAGNOSIS — I25.10 CORONARY ARTERY DISEASE INVOLVING NATIVE CORONARY ARTERY OF NATIVE HEART WITHOUT ANGINA PECTORIS: Primary | ICD-10-CM

## 2025-05-05 PROCEDURE — 1123F ACP DISCUSS/DSCN MKR DOCD: CPT | Performed by: INTERNAL MEDICINE

## 2025-05-05 PROCEDURE — 1159F MED LIST DOCD IN RCRD: CPT | Performed by: INTERNAL MEDICINE

## 2025-05-05 PROCEDURE — 1126F AMNT PAIN NOTED NONE PRSNT: CPT | Performed by: INTERNAL MEDICINE

## 2025-05-05 PROCEDURE — 1160F RVW MEDS BY RX/DR IN RCRD: CPT | Performed by: INTERNAL MEDICINE

## 2025-05-05 PROCEDURE — 1036F TOBACCO NON-USER: CPT | Performed by: INTERNAL MEDICINE

## 2025-05-05 PROCEDURE — G8427 DOCREV CUR MEDS BY ELIG CLIN: HCPCS | Performed by: INTERNAL MEDICINE

## 2025-05-05 PROCEDURE — 99213 OFFICE O/P EST LOW 20 MIN: CPT | Performed by: INTERNAL MEDICINE

## 2025-05-05 PROCEDURE — G8420 CALC BMI NORM PARAMETERS: HCPCS | Performed by: INTERNAL MEDICINE

## 2025-05-05 RX ORDER — CALCIUM CARBONATE 300MG(750)
TABLET,CHEWABLE ORAL
COMMUNITY
Start: 2023-12-01

## 2025-05-05 ASSESSMENT — ENCOUNTER SYMPTOMS
HEMATEMESIS: 0
DIARRHEA: 0
CHEST TIGHTNESS: 0
ABDOMINAL PAIN: 0
SPUTUM PRODUCTION: 0
BOWEL INCONTINENCE: 0
WHEEZING: 0
HEMATOCHEZIA: 0
COLOR CHANGE: 0
SHORTNESS OF BREATH: 0
ORTHOPNEA: 0
BLURRED VISION: 0
HOARSE VOICE: 0

## 2025-05-05 NOTE — PROGRESS NOTES
[facility]  [address] [place] [address], [address] [place] [address]  PHONE: [phone]        25        NAME:  Sami Bazzi  : 1933  MRN: [id number]       SUBJECTIVE:   aSmi Bazzi is a 89 y.o. male seen for a follow up visit regarding the following: CAD, insulin-dependent diabetes, hyperlipidemia, and primary hypertension.  He returns for scheduled follow-up.  He reports feeling much better with the resolution of vertigo.    Chief Complaint   Patient presents with    Hyperlipidemia    Hypertension    Chest Pain       HPI:    Coronary Artery Disease  Presents for follow-up visit. Pertinent negatives include no chest pain, chest pressure, chest tightness, dizziness, leg swelling, muscle weakness, palpitations, shortness of breath or weight gain. The symptoms have been stable.       Past Medical History, Past Surgical History, Family history, Social History, and Medications were all reviewed with the patient today and updated as necessary.         Current Outpatient Medications:     Magnesium 400 MG TABS, , Disp: , Rfl:     tamsulosin (FLOMAX) 0.4 MG capsule, Take 1 capsule by mouth daily, Disp: 90 capsule, Rfl: 3    Riboflavin 400 MG TABS, , Disp: , Rfl:     insulin aspart (NOVOLOG) 100 UNIT/ML injection pen, 14 units + 1:50 sliding scale with breakfast and lunch, 12 units +1:50 sliding scale with dinner, Disp: , Rfl:     Insulin Degludec (TRESIBA FLEXTOUCH) 100 UNIT/ML SOPN, Inject 34 Units into the skin every morning, Disp: , Rfl:     pantoprazole (PROTONIX) 20 MG tablet, , Disp: , Rfl:     atorvastatin (LIPITOR) 20 MG tablet, Take 1 tablet by mouth, Disp: , Rfl:     aspirin 81 MG EC tablet, Take 1 tablet by mouth daily, Disp: , Rfl:     vitamin D 25 MCG (1000 UT) CAPS, Take 4 capsules by mouth daily, Disp: , Rfl:     lipase-protease-amylase (CREON) 28312-78324 units delayed release capsule, Take 2 capsules by mouth 3 times daily (with meals), Disp: , Rfl:   Allergies

## 2025-06-09 ENCOUNTER — TELEPHONE (OUTPATIENT)
Dept: UROLOGY | Age: 89
End: 2025-06-09

## 2025-06-09 NOTE — TELEPHONE ENCOUNTER
Pt called in to set up an appointment with Dr. Mandel. Pt states he is having trouble starting and stopping using the restroom. Pt also states he found out only 1 of his kidneys work. Have pt set to see Dr. Pack in August

## 2025-06-13 ENCOUNTER — OFFICE VISIT (OUTPATIENT)
Dept: UROLOGY | Age: 89
End: 2025-06-13

## 2025-06-13 DIAGNOSIS — R35.0 BENIGN PROSTATIC HYPERPLASIA WITH URINARY FREQUENCY: Primary | ICD-10-CM

## 2025-06-13 DIAGNOSIS — R35.1 NOCTURIA: ICD-10-CM

## 2025-06-13 DIAGNOSIS — N20.0 CALCULUS OF KIDNEY: ICD-10-CM

## 2025-06-13 DIAGNOSIS — R33.9 URINE RETENTION: ICD-10-CM

## 2025-06-13 DIAGNOSIS — N40.1 BENIGN PROSTATIC HYPERPLASIA WITH URINARY FREQUENCY: Primary | ICD-10-CM

## 2025-06-13 LAB
BILIRUBIN, URINE, POC: NEGATIVE
BLOOD URINE, POC: NORMAL
GLUCOSE URINE, POC: 500 MG/DL
KETONES, URINE, POC: NEGATIVE MG/DL
LEUKOCYTE ESTERASE, URINE, POC: NEGATIVE
NITRITE, URINE, POC: NEGATIVE
PH, URINE, POC: 5.5 (ref 4.6–8)
PROTEIN,URINE, POC: 30 MG/DL
PVR, POC: 0 CC
SPECIFIC GRAVITY, URINE, POC: 1.01 (ref 1–1.03)
URINALYSIS CLARITY, POC: NORMAL
URINALYSIS COLOR, POC: NORMAL
UROBILINOGEN, POC: NORMAL MG/DL

## 2025-06-13 RX ORDER — DESMOPRESSIN ACETATE 0.2 MG/1
0.2 TABLET ORAL NIGHTLY
Qty: 90 TABLET | Refills: 3 | Status: SHIPPED | OUTPATIENT
Start: 2025-06-13

## 2025-06-13 RX ORDER — TAMSULOSIN HYDROCHLORIDE 0.4 MG/1
0.4 CAPSULE ORAL 2 TIMES DAILY
Qty: 180 CAPSULE | Refills: 3 | Status: SHIPPED | OUTPATIENT
Start: 2025-06-13

## 2025-07-17 ENCOUNTER — HOSPITAL ENCOUNTER (OUTPATIENT)
Dept: ULTRASOUND IMAGING | Age: 89
Discharge: HOME | End: 2025-07-20
Attending: INTERNAL MEDICINE
Payer: MEDICARE

## 2025-07-17 DIAGNOSIS — Z85.05 HISTORY OF HEPATOCELLULAR CARCINOMA: ICD-10-CM

## 2025-07-17 PROCEDURE — 76700 US EXAM ABDOM COMPLETE: CPT

## 2025-07-24 ENCOUNTER — HOSPITAL ENCOUNTER (OUTPATIENT)
Dept: LAB | Age: 89
Discharge: HOME | End: 2025-07-24
Payer: MEDICARE

## 2025-07-24 ENCOUNTER — OFFICE VISIT (OUTPATIENT)
Dept: ONCOLOGY | Age: 89
End: 2025-07-24

## 2025-07-24 VITALS
RESPIRATION RATE: 18 BRPM | TEMPERATURE: 97.5 F | OXYGEN SATURATION: 96 % | HEART RATE: 61 BPM | SYSTOLIC BLOOD PRESSURE: 134 MMHG | HEIGHT: 69 IN | DIASTOLIC BLOOD PRESSURE: 71 MMHG | WEIGHT: 185.7 LBS | BODY MASS INDEX: 27.5 KG/M2

## 2025-07-24 DIAGNOSIS — F40.240 CLAUSTROPHOBIA: ICD-10-CM

## 2025-07-24 DIAGNOSIS — K76.9 LIVER LESION: ICD-10-CM

## 2025-07-24 DIAGNOSIS — Z85.05 HISTORY OF HEPATOCELLULAR CARCINOMA: ICD-10-CM

## 2025-07-24 DIAGNOSIS — Z85.05 HISTORY OF HEPATOCELLULAR CARCINOMA: Primary | ICD-10-CM

## 2025-07-24 LAB
AFP-TM SERPL-MCNC: 6.76 NG/ML (ref 0–8.3)
ALBUMIN SERPL-MCNC: 3.5 G/DL (ref 3.2–4.6)
ALBUMIN/GLOB SERPL: 1.1 (ref 1–1.9)
ALP SERPL-CCNC: 71 U/L (ref 40–129)
ALT SERPL-CCNC: 13 U/L (ref 8–55)
ANION GAP SERPL CALC-SCNC: 13 MMOL/L (ref 7–16)
AST SERPL-CCNC: 18 U/L (ref 15–37)
BASOPHILS # BLD: 0.07 K/UL (ref 0–0.2)
BASOPHILS NFR BLD: 1.2 % (ref 0–2)
BILIRUB SERPL-MCNC: 0.4 MG/DL (ref 0–1.2)
BUN SERPL-MCNC: 27 MG/DL (ref 8–23)
CALCIUM SERPL-MCNC: 9.5 MG/DL (ref 8.8–10.2)
CHLORIDE SERPL-SCNC: 104 MMOL/L (ref 98–107)
CO2 SERPL-SCNC: 18 MMOL/L (ref 20–29)
CREAT SERPL-MCNC: 1.96 MG/DL (ref 0.8–1.3)
DIFFERENTIAL METHOD BLD: ABNORMAL
EOSINOPHIL # BLD: 0.43 K/UL (ref 0–0.8)
EOSINOPHIL NFR BLD: 7.4 % (ref 0.5–7.8)
ERYTHROCYTE [DISTWIDTH] IN BLOOD BY AUTOMATED COUNT: 13.8 % (ref 11.9–14.6)
GLOBULIN SER CALC-MCNC: 3.2 G/DL (ref 2.3–3.5)
GLUCOSE SERPL-MCNC: 232 MG/DL (ref 70–99)
HCT VFR BLD AUTO: 43.8 % (ref 41.1–50.3)
HGB BLD-MCNC: 13.6 G/DL (ref 13.6–17.2)
IMM GRANULOCYTES # BLD AUTO: 0.01 K/UL (ref 0–0.5)
IMM GRANULOCYTES NFR BLD AUTO: 0.2 % (ref 0–5)
LYMPHOCYTES # BLD: 1.43 K/UL (ref 0.5–4.6)
LYMPHOCYTES NFR BLD: 24.5 % (ref 13–44)
MCH RBC QN AUTO: 26.7 PG (ref 26.1–32.9)
MCHC RBC AUTO-ENTMCNC: 31.1 G/DL (ref 31.4–35)
MCV RBC AUTO: 86.1 FL (ref 82–102)
MONOCYTES # BLD: 0.55 K/UL (ref 0.1–1.3)
MONOCYTES NFR BLD: 9.4 % (ref 4–12)
NEUTS SEG # BLD: 3.35 K/UL (ref 1.7–8.2)
NEUTS SEG NFR BLD: 57.3 % (ref 43–78)
NRBC # BLD: 0 K/UL (ref 0–0.2)
PLATELET # BLD AUTO: 192 K/UL (ref 150–450)
PMV BLD AUTO: 10.4 FL (ref 9.4–12.3)
POTASSIUM SERPL-SCNC: 4.7 MMOL/L (ref 3.5–5.1)
PROT SERPL-MCNC: 6.7 G/DL (ref 6.3–8.2)
RBC # BLD AUTO: 5.09 M/UL (ref 4.23–5.6)
SODIUM SERPL-SCNC: 135 MMOL/L (ref 136–145)
WBC # BLD AUTO: 5.8 K/UL (ref 4.3–11.1)

## 2025-07-24 PROCEDURE — 80053 COMPREHEN METABOLIC PANEL: CPT

## 2025-07-24 PROCEDURE — 36415 COLL VENOUS BLD VENIPUNCTURE: CPT

## 2025-07-24 PROCEDURE — 85025 COMPLETE CBC W/AUTO DIFF WBC: CPT

## 2025-07-24 PROCEDURE — 82105 ALPHA-FETOPROTEIN SERUM: CPT

## 2025-07-24 RX ORDER — LORAZEPAM 1 MG/1
TABLET ORAL
Qty: 1 TABLET | Refills: 0 | Status: SHIPPED | OUTPATIENT
Start: 2025-07-24 | End: 2025-08-24

## 2025-07-24 NOTE — PATIENT INSTRUCTIONS
Patient Information from Today's Visit    The members of your [facility] are listed below:    Physician Provider: Fabienne Alvarado Medical Oncologist  Advanced Practice Clinician: Rocio Saldivar NP  Registered Nurse: Marisol SMART RN  Medical Assistant: Jeremy BRAVO CMA  :Teena CARRENO  Supportive Care Services: Leon BUITRAGO LMSW    Diagnosis (Information Sheet Provided on Day of Diagnosis): Hepatocellular    Follow Up Instructions:   Labs reviewed  STEPHANIE reviewed   We would like for you to have a MRI of your liver and you can call radiology scheduling to get this scheduled.  If you need anything prior please do not hesitate to call our office.  Has Treatment Plan Been Finalized? No    Current Labs:   Hospital Outpatient Visit on 07/24/2025   Component Date Value Ref Range Status    AFP-Tumor Marker 07/24/2025 6.76  0.00 - 8.30 ng/mL Final    Comment: Roche ECLIA methodology  Patient's results of tumor marker testing may not be comparable to labs using different manufacturers/methods.      Sodium 07/24/2025 135 (L)  136 - 145 mmol/L Final    Potassium 07/24/2025 4.7  3.5 - 5.1 mmol/L Final    Chloride 07/24/2025 104  98 - 107 mmol/L Final    CO2 07/24/2025 18 (L)  20 - 29 mmol/L Final    Anion Gap 07/24/2025 13  7 - 16 mmol/L Final    Glucose 07/24/2025 232 (H)  70 - 99 mg/dL Final    Comment: <70 mg/dL Consistent with, but not fully diagnostic of hypoglycemia.  100 - 125 mg/dL Impaired fasting glucose/consistent with pre-diabetes mellitus.  > 126 mg/dl Fasting glucose consistent with overt diabetes mellitus      BUN 07/24/2025 27 (H)  8 - 23 MG/DL Final    Creatinine 07/24/2025 1.96 (H)  0.80 - 1.30 MG/DL Final    Est, Glom Filt Rate 07/24/2025 31 (L)  >60 ml/min/1.73m2 Final    Comment:    Pediatric calculator link: https://www.kidney.org/professionals/kdoqi/gfr_calculatorped     These results are not intended for use in patients <18 years of age.     eGFR results are calculated without a race

## 2025-07-24 NOTE — PROGRESS NOTES
Diabetes      MSK  Denies back pain, swollen legs, myalgias and falls.     Psychiatric/Behavioral  Denies depression and substance abuse. The patient is not nervous/anxious.              Allergies   Allergen Reactions    Iohexol Anaphylaxis     Other reaction(s): Anaphylactic shock-Allergy    Ciprofloxacin Hives    Codeine Other (See Comments)     Makes patient hyperactive  Other reaction(s): Unknown-Unspecified  Makes patient hyperactive    Penicillins Rash     Other reaction(s): Rash-Allergy    Valdecoxib Hives and Rash     Vioxx     Past Medical History:   Diagnosis Date    Abnormal CT of liver 03/02/2021    New heterogeneous liver mass measuring up to 6.4 cm. Further evaluation required    Anemia     GI esophageal ulcer per EGD- was given 2 units prbc's 3/12/21, eliquis has been stopped    AR (allergic rhinitis) 8/3/2016    Arthritis     Benign paroxysmal positional vertigo     Bilateral impacted cerumen     BPH (benign prostatic hyperplasia)     CAD (coronary artery disease)     2003 stent placed; MI in 2003     Chronic kidney disease     Stage III kidney disease elevated creatinine BUN    Chronic otitis media     Chronic pain     herniated disc in lower back; ESIs in recent past    Chronic pancreatitis (HCC)     Claustrophobia     Diverticulitis 8/3/2016    DNS (deviated nasal septum)     ETD (eustachian tube dysfunction)     GERD (gastroesophageal reflux disease)     controlled w/ prescription meds and OTC meds (pepcid)    H/O heart artery stent     S/P RCA stent by Dr. Gómez in 2003    Heart disease 8/3/2016    Hepatocellular carcinoma (HCC)     High frequency hearing loss     History of bilateral inguinal hernia repair     History of gastroesophageal reflux (GERD)     controlled w/ med; no breakthrough GERD    Hx of exercise stress test 02/22/2019    EF on Stress test 71%    Hypercholesterolemia     takes statin and fish oil    Hyperlipidemia     takes statin and fish oil    Hypertrophy of both inferior

## 2025-07-25 ENCOUNTER — TELEPHONE (OUTPATIENT)
Dept: ONCOLOGY | Age: 89
End: 2025-07-25

## 2025-07-25 NOTE — TELEPHONE ENCOUNTER
Physician provider: Dr. Alvaraod  Reason for today's call (Please detail here patients chief complaint): MRI    Last office visit:NA  Patient Callback Number: [phone]  Was callback number verified?: Yes  Preferred pharmacy (If refill request): NA  Veriified that patient confirmed no refills left at pharmacy? :No  Has the patient called the office for this concern within the last 48 hours?:No    Red Word Mentioned  Warm Transfer Phone Call to (Name): no    Patient notified that their information will be routed to the appropriate team for review. Patient is advised that they will receive a phone call from the appropriate department. If awaiting a call from the triage department and symptoms worsen before receiving a call back, the patient has been advised to proceed to the nearest ED.      Lina the MRI tech  [phone]  The MRI could not be done because the patient wear hearing aides and could not hear   The patient have kidney disease and is not a candidates for contrast and had a previous reaction

## 2025-07-28 ENCOUNTER — PATIENT MESSAGE (OUTPATIENT)
Dept: ONCOLOGY | Age: 89
End: 2025-07-28

## 2025-07-29 ENCOUNTER — TELEPHONE (OUTPATIENT)
Dept: RADIATION ONCOLOGY | Age: 89
End: 2025-07-29

## 2025-07-29 NOTE — TELEPHONE ENCOUNTER
Physician provider: Dr. Alvarado  Reason for today's call (Please detail here patients chief complaint): pt went to have MRI but could not hear the person conducting the test, so it was cancelled, should he cancel the appt for tomorrow? He also wants to know if the MRI could be scheduled somewhere he would be able to hear the person talking? Please call him to discuss this problem and advise on the appt. For tomorrow.?    Last office visit:7/24/25  Patient Callback Number: [phone]  Was callback number verified?: Yes  Preferred pharmacy (If refill request): n/a  Veriified that patient confirmed no refills left at pharmacy? :No  Has the patient called the office for this concern within the last 48 hours?:No    Red Word Mentioned  Warm Transfer Phone Call to (Name): n/a    Patient notified that their information will be routed to the appropriate team for review. Patient is advised that they will receive a phone call from the appropriate department. If awaiting a call from the triage department and symptoms worsen before receiving a call back, the patient has been advised to proceed to the nearest ED.

## 2025-07-30 ENCOUNTER — TELEPHONE (OUTPATIENT)
Dept: ONCOLOGY | Age: 89
End: 2025-07-30

## 2025-07-30 DIAGNOSIS — Z85.05 HISTORY OF HEPATOCELLULAR CARCINOMA: ICD-10-CM

## 2025-07-30 NOTE — TELEPHONE ENCOUNTER
Called and spoke to patient this AM.  Patient is willing to try MRI again.  Order has been pended and sent to Dr. Alvarado for signature.  Patient is aware he doesn't need to come to his appointment today.  Appointment will be rescheduled after the MRI has been completed.

## 2025-07-31 DIAGNOSIS — F40.240 CLAUSTROPHOBIA: ICD-10-CM

## 2025-08-01 ENCOUNTER — HOSPITAL ENCOUNTER (OUTPATIENT)
Dept: MRI IMAGING | Age: 89
Discharge: HOME | End: 2025-08-01
Attending: INTERNAL MEDICINE
Payer: MEDICARE

## 2025-08-01 ENCOUNTER — TELEPHONE (OUTPATIENT)
Dept: RADIATION ONCOLOGY | Age: 89
End: 2025-08-01

## 2025-08-01 DIAGNOSIS — F40.240 CLAUSTROPHOBIA: ICD-10-CM

## 2025-08-01 DIAGNOSIS — Z85.05 HISTORY OF HEPATOCELLULAR CARCINOMA: ICD-10-CM

## 2025-08-01 PROCEDURE — 74183 MRI ABD W/O CNTR FLWD CNTR: CPT

## 2025-08-01 PROCEDURE — A9579 GAD-BASE MR CONTRAST NOS,1ML: HCPCS | Performed by: INTERNAL MEDICINE

## 2025-08-01 PROCEDURE — 6360000004 HC RX CONTRAST MEDICATION: Performed by: INTERNAL MEDICINE

## 2025-08-01 RX ORDER — LORAZEPAM 1 MG/1
TABLET ORAL
Qty: 1 TABLET | Refills: 0 | Status: SHIPPED | OUTPATIENT
Start: 2025-08-01 | End: 2025-09-01

## 2025-08-01 RX ORDER — GADOTERIDOL 279.3 MG/ML
17 INJECTION INTRAVENOUS
Status: COMPLETED | OUTPATIENT
Start: 2025-08-01 | End: 2025-08-01

## 2025-08-01 RX ADMIN — GADOTERIDOL 17 ML: 279.3 INJECTION, SOLUTION INTRAVENOUS at 14:40

## 2025-08-01 NOTE — TELEPHONE ENCOUNTER
Physician provider: Dr. Alvarado  Reason for today's call (Please detail here patients chief complaint): pt sent My chart message yesterday he is to have an MRI today at 1 and needs the Lorazapam sent in to pharmacy [facility]    Last office visit:n/a  Patient Callback Number: [phone]  Was callback number verified?: Yes  Preferred pharmacy (If refill request): [facility] ([facility]: [place]) on [address]  Veriified that patient confirmed no refills left at pharmacy? :Yes  Has the patient called the office for this concern within the last 48 hours?:Yes    Red Word Mentioned  Warm Transfer Phone Call to (Name): n/a    Patient notified that their information will be routed to the appropriate team for review. Patient is advised that they will receive a phone call from the appropriate department. If awaiting a call from the triage department and symptoms worsen before receiving a call back, the patient has been advised to proceed to the nearest ED.

## 2025-08-12 ENCOUNTER — OFFICE VISIT (OUTPATIENT)
Dept: ONCOLOGY | Age: 89
End: 2025-08-12
Payer: MEDICARE

## 2025-08-12 VITALS
SYSTOLIC BLOOD PRESSURE: 137 MMHG | HEART RATE: 63 BPM | HEIGHT: 69 IN | DIASTOLIC BLOOD PRESSURE: 70 MMHG | OXYGEN SATURATION: 96 % | BODY MASS INDEX: 27.52 KG/M2 | RESPIRATION RATE: 16 BRPM | WEIGHT: 185.8 LBS | TEMPERATURE: 97.8 F

## 2025-08-12 DIAGNOSIS — Z85.05 HISTORY OF HEPATOCELLULAR CARCINOMA: ICD-10-CM

## 2025-08-12 DIAGNOSIS — C22.0 HEPATOCELLULAR CARCINOMA (HCC): ICD-10-CM

## 2025-08-12 PROCEDURE — 1159F MED LIST DOCD IN RCRD: CPT | Performed by: INTERNAL MEDICINE

## 2025-08-12 PROCEDURE — 1123F ACP DISCUSS/DSCN MKR DOCD: CPT | Performed by: INTERNAL MEDICINE

## 2025-08-12 PROCEDURE — 1160F RVW MEDS BY RX/DR IN RCRD: CPT | Performed by: INTERNAL MEDICINE

## 2025-08-12 PROCEDURE — G8427 DOCREV CUR MEDS BY ELIG CLIN: HCPCS | Performed by: INTERNAL MEDICINE

## 2025-08-12 PROCEDURE — 99214 OFFICE O/P EST MOD 30 MIN: CPT | Performed by: INTERNAL MEDICINE

## 2025-08-12 PROCEDURE — 1126F AMNT PAIN NOTED NONE PRSNT: CPT | Performed by: INTERNAL MEDICINE

## 2025-08-12 PROCEDURE — G8417 CALC BMI ABV UP PARAM F/U: HCPCS | Performed by: INTERNAL MEDICINE

## 2025-08-12 PROCEDURE — 1036F TOBACCO NON-USER: CPT | Performed by: INTERNAL MEDICINE

## 2025-08-12 ASSESSMENT — PATIENT HEALTH QUESTIONNAIRE - PHQ9
SUM OF ALL RESPONSES TO PHQ QUESTIONS 1-9: 0
1. LITTLE INTEREST OR PLEASURE IN DOING THINGS: NOT AT ALL
2. FEELING DOWN, DEPRESSED OR HOPELESS: NOT AT ALL
SUM OF ALL RESPONSES TO PHQ QUESTIONS 1-9: 0

## 2025-08-14 RX ORDER — LORAZEPAM 1 MG/1
TABLET ORAL
Qty: 1 TABLET | Refills: 0 | OUTPATIENT
Start: 2025-08-14 | End: 2025-08-31

## (undated) DEVICE — ELECTRODE ELECSURG LOOP 30 DEG MED QUIK FIRE

## (undated) DEVICE — TUBING INSUFFLATION SMK EVAC HI FLO SET PNEUMOCLEAR

## (undated) DEVICE — SOLUTION IRRIG 3000ML H2O STRL BAG

## (undated) DEVICE — GARMENT,MEDLINE,DVT,INT,CALF,FOAM,MED: Brand: MEDLINE

## (undated) DEVICE — FORCEPS BX L240CM JAW DIA2.8MM L CAP W/ NDL MIC MESH TOOTH

## (undated) DEVICE — COVER,TABLE,44X90,STERILE: Brand: MEDLINE

## (undated) DEVICE — CONTAINER SPEC 4 OZ CAP SEAL POLYPR TRNSLUC BLU STRL

## (undated) DEVICE — APPLIER CLP M/L SHFT DIA5MM 15 LIG LIGAMAX 5

## (undated) DEVICE — NEEDLE HYPO 25GA L1.5IN BLU POLYPR HUB S STL REG BVL STR

## (undated) DEVICE — BLOCK BITE AD 60FR W/ VELC STRP ADDRESSES MOST PT AND

## (undated) DEVICE — ACCESS PLATFORM FOR MINIMALLY INVASIVE SURGERY.: Brand: GELPORT® LAPAROSCOPIC  SYSTEM

## (undated) DEVICE — LAPAROSCOPIC TROCAR SLEEVE/SINGLE USE: Brand: KII® OPTICAL ACCESS SYSTEM

## (undated) DEVICE — RESERVOIR,SUCTION,100CC,SILICONE: Brand: MEDLINE

## (undated) DEVICE — CATH URETH FOL 2W SH 18FRX5ML --

## (undated) DEVICE — SUTURE PERMAHAND SZ 2-0 L30IN 10X30IN TIE NONABSORBABLE BLK SA85H

## (undated) DEVICE — KENDALL RADIOLUCENT FOAM MONITORING ELECTRODE RECTANGULAR SHAPE: Brand: KENDALL

## (undated) DEVICE — DRAIN SURG 19FR 100% SIL RADPQ RND CHN FULL FLUT

## (undated) DEVICE — MEDI-VAC NON-CONDUCTIVE SUCTION TUBING: Brand: CARDINAL HEALTH

## (undated) DEVICE — SUTURE PERMAHAND SZ 0 L30IN NONABSORBABLE BLK SILK BRAID A306H

## (undated) DEVICE — CANNULA NSL ORAL AD FOR CAPNOFLEX CO2 O2 AIRLFE

## (undated) DEVICE — SUTURE PERMAHAND SZ 3-0 L18IN NONABSORBABLE BLK L26MM SH C013D

## (undated) DEVICE — URETERAL ACCESS SHEATH SET: Brand: NAVIGATOR HD

## (undated) DEVICE — GARMENT,MEDLINE,DVT,INT,CALF,MED, GEN2: Brand: MEDLINE

## (undated) DEVICE — FLEXIBLE LAPAROSCOPIC ARGON ELECTRODE,COAGULATION ONLY: Brand: VALLEYLAB

## (undated) DEVICE — GENERAL LAPAROSCOPY: Brand: MEDLINE INDUSTRIES, INC.

## (undated) DEVICE — BAG,DRAINAGE,4L,A/R TOWER,LL,SLIDE TAP: Brand: MEDLINE

## (undated) DEVICE — ARGON HANDSET: Brand: VALLEYLAB

## (undated) DEVICE — CYSTO/BLADDER IRRIGATION SET, REGULATING CLAMP

## (undated) DEVICE — TROCAR: Brand: KII FIOS FIRST ENTRY

## (undated) DEVICE — LOGICUT SCISSOR LENGTH 320MM: Brand: LOGI - LAPAROSCOPIC INSTRUMENT SYSTEM

## (undated) DEVICE — INTENDED FOR TISSUE SEPARATION, AND OTHER PROCEDURES THAT REQUIRE A SHARP SURGICAL BLADE TO PUNCTURE OR CUT.: Brand: BARD-PARKER ®  SAFETY SCALPED

## (undated) DEVICE — TRAY CATH 16F DRN BG LTX -- CONVERT TO ITEM 363158

## (undated) DEVICE — Y-TYPE TUR/BLADDER IRRIGATION SET, REGULATING CLAMP

## (undated) DEVICE — RADIFOCUS GLIDEWIRE: Brand: GLIDEWIRE

## (undated) DEVICE — CATHETER URETH 24FR BLLN 30CC STD LTX 3 W TWO OPP DRNGE EYE

## (undated) DEVICE — SPONGE GZ W4XL4IN COT 12 PLY TYP VII WVN C FLD DSGN

## (undated) DEVICE — DRAPE TWL SURG 16X26IN BLU ORB04] ALLCARE INC]

## (undated) DEVICE — TURP TURB: Brand: MEDLINE INDUSTRIES, INC.

## (undated) DEVICE — CRADLE POS PRONE 24 X 5 X 3 IN ARM N COMPR NO CVR FOAM DISP

## (undated) DEVICE — COVER,TABLE,44X76,STERILE: Brand: MEDLINE

## (undated) DEVICE — BUTTON SWITCH PENCIL BLADE ELECTRODE, HOLSTER: Brand: EDGE

## (undated) DEVICE — WATER 50W MAX / AIR 8W MAX: Brand: FLEXIVA TRACTIP

## (undated) DEVICE — REM POLYHESIVE ADULT PATIENT RETURN ELECTRODE: Brand: VALLEYLAB

## (undated) DEVICE — SUTURE PERMAHAND SZ 2-0 L30IN NONABSORBABLE BLK SILK W/O A305H

## (undated) DEVICE — SOLUTION IV 1000ML 0.9% SOD CHL

## (undated) DEVICE — MARYLAND JAW LAPAROSCOPIC SEALER/DIVIDER COATED: Brand: LIGASURE

## (undated) DEVICE — SUTURE VCRL SZ 0 L36IN ABSRB UD L36MM CT-1 1/2 CIR J946H

## (undated) DEVICE — SUTURE PERMAHAND SZ 3-0 L30IN NONABSORBABLE BLK W/O NDL SA84H

## (undated) DEVICE — DEVICE SECUREMENT 1/32IN POLYETH FOAM F ANCHR URIN CATH

## (undated) DEVICE — CONNECTOR TBNG OD5-7MM O2 END DISP

## (undated) DEVICE — GOWN,SIRUS,POLYRNF,SETINSLV,L,20/CS: Brand: MEDLINE

## (undated) DEVICE — HOOKED-PRONG GRASPING FORCEPS: Brand: TRICEP

## (undated) DEVICE — CONTAINER PREFIL FRMLN 40ML --

## (undated) DEVICE — Device

## (undated) DEVICE — GLOVE SURG SZ 6.5 L11.2IN THK8.6MIL LT BRN LTX FREE

## (undated) DEVICE — TROCAR: Brand: KII® SLEEVE

## (undated) DEVICE — SUTURE PDS II SZ 0 L60IN ABSRB VLT L65MM TP-1 1/2 CIR Z991G

## (undated) DEVICE — SOLUTION IRRIG 3000ML 0.9% SOD CHL FLX CONT 0797208] ICU MEDICAL INC]

## (undated) DEVICE — VISUALIZATION SYSTEM: Brand: CLEARIFY

## (undated) DEVICE — TRAY PREP DRY W/ PREM GLV 2 APPL 6 SPNG 2 UNDPD 1 OVERWRAP

## (undated) DEVICE — PREP SKN CHLRAPRP APL 26ML STR --

## (undated) DEVICE — GDWIRE 3CM FLX-TIP 0.038X150CM -- BX/5 SENSOR

## (undated) DEVICE — PACK SURGICAL PROCEDURE KIT CYSTOSCOPY TOTE

## (undated) DEVICE — 2, DISPOSABLE SUCTION/IRRIGATOR WITHOUT DISPOSABLE TIP: Brand: STRYKEFLOW

## (undated) DEVICE — SUTURE PERMAHAND SZ 2-0 L18IN NONABSORBABLE BLK L26MM PS 1588H